# Patient Record
Sex: FEMALE | Race: BLACK OR AFRICAN AMERICAN | Employment: OTHER | URBAN - NONMETROPOLITAN AREA
[De-identification: names, ages, dates, MRNs, and addresses within clinical notes are randomized per-mention and may not be internally consistent; named-entity substitution may affect disease eponyms.]

---

## 2021-12-25 ENCOUNTER — APPOINTMENT (OUTPATIENT)
Dept: GENERAL RADIOLOGY | Age: 71
End: 2021-12-25
Attending: EMERGENCY MEDICINE
Payer: MEDICARE

## 2021-12-25 ENCOUNTER — HOSPITAL ENCOUNTER (EMERGENCY)
Age: 71
Discharge: ANOTHER HOSPITAL | End: 2021-12-26
Attending: EMERGENCY MEDICINE | Admitting: EMERGENCY MEDICINE
Payer: MEDICARE

## 2021-12-25 DIAGNOSIS — I50.43 ACUTE ON CHRONIC COMBINED SYSTOLIC AND DIASTOLIC CONGESTIVE HEART FAILURE (HCC): ICD-10-CM

## 2021-12-25 DIAGNOSIS — I21.4 NON-ST ELEVATION (NSTEMI) MYOCARDIAL INFARCTION (HCC): Primary | ICD-10-CM

## 2021-12-25 LAB
ALBUMIN SERPL-MCNC: 3.6 G/DL (ref 3.5–5)
ALBUMIN/GLOB SERPL: 0.8 {RATIO} (ref 1.1–2.2)
ALP SERPL-CCNC: 82 U/L (ref 45–117)
ALT SERPL-CCNC: 28 U/L (ref 12–78)
ANION GAP SERPL CALC-SCNC: 16 MMOL/L (ref 5–15)
AST SERPL W P-5'-P-CCNC: 38 U/L (ref 15–37)
BASOPHILS # BLD: 0.1 K/UL (ref 0–0.2)
BASOPHILS NFR BLD: 1 % (ref 0–2.5)
BILIRUB SERPL-MCNC: 0.2 MG/DL (ref 0.2–1)
BNP SERPL-MCNC: ABNORMAL PG/ML
BUN SERPL-MCNC: 62 MG/DL (ref 6–20)
BUN/CREAT SERPL: 11 (ref 12–20)
CA-I BLD-MCNC: 9.6 MG/DL (ref 8.5–10.1)
CHLORIDE SERPL-SCNC: 100 MMOL/L (ref 97–108)
CO2 SERPL-SCNC: 21 MMOL/L (ref 21–32)
CREAT SERPL-MCNC: 5.53 MG/DL (ref 0.55–1.02)
EOSINOPHIL # BLD: 0.1 K/UL (ref 0–0.7)
EOSINOPHIL NFR BLD: 1 % (ref 0.9–2.9)
ERYTHROCYTE [DISTWIDTH] IN BLOOD BY AUTOMATED COUNT: 18.5 % (ref 11.5–14.5)
GLOBULIN SER CALC-MCNC: 4.4 G/DL (ref 2–4)
GLUCOSE SERPL-MCNC: 150 MG/DL (ref 65–100)
HCT VFR BLD AUTO: 21.9 % (ref 36–46)
HGB BLD-MCNC: 7.1 G/DL (ref 13.5–17.5)
LACTATE SERPL-SCNC: 1 MMOL/L (ref 0.4–2)
LYMPHOCYTES # BLD: 2 K/UL (ref 1–4.8)
LYMPHOCYTES NFR BLD: 17 % (ref 20.5–51.1)
MCH RBC QN AUTO: 24.3 PG (ref 31–34)
MCHC RBC AUTO-ENTMCNC: 32.5 G/DL (ref 31–36)
MCV RBC AUTO: 74.8 FL (ref 80–100)
MONOCYTES # BLD: 0.8 K/UL (ref 0.2–2.4)
MONOCYTES NFR BLD: 7 % (ref 1.7–9.3)
NEUTS SEG # BLD: 8.7 K/UL (ref 1.8–7.7)
NEUTS SEG NFR BLD: 74 % (ref 42–75)
NRBC # BLD: 0 K/UL
NRBC BLD-RTO: 0 PER 100 WBC
PLATELET # BLD AUTO: 291 K/UL (ref 150–400)
PMV BLD AUTO: 8.2 FL (ref 6.5–11.5)
POTASSIUM SERPL-SCNC: 2.9 MMOL/L (ref 3.5–5.1)
PROT SERPL-MCNC: 8 G/DL (ref 6.4–8.2)
RBC # BLD AUTO: 2.93 M/UL (ref 4.5–5.9)
SODIUM SERPL-SCNC: 137 MMOL/L (ref 136–145)
TROPONIN-HIGH SENSITIVITY: 876 NG/L (ref 0–51)
WBC # BLD AUTO: 11.7 K/UL (ref 4.4–11.3)

## 2021-12-25 PROCEDURE — 94640 AIRWAY INHALATION TREATMENT: CPT

## 2021-12-25 PROCEDURE — 74011000250 HC RX REV CODE- 250: Performed by: EMERGENCY MEDICINE

## 2021-12-25 PROCEDURE — 85025 COMPLETE CBC W/AUTO DIFF WBC: CPT

## 2021-12-25 PROCEDURE — 96374 THER/PROPH/DIAG INJ IV PUSH: CPT

## 2021-12-25 PROCEDURE — 74011250637 HC RX REV CODE- 250/637: Performed by: EMERGENCY MEDICINE

## 2021-12-25 PROCEDURE — 84484 ASSAY OF TROPONIN QUANT: CPT

## 2021-12-25 PROCEDURE — 71045 X-RAY EXAM CHEST 1 VIEW: CPT

## 2021-12-25 PROCEDURE — 99285 EMERGENCY DEPT VISIT HI MDM: CPT

## 2021-12-25 PROCEDURE — 83880 ASSAY OF NATRIURETIC PEPTIDE: CPT

## 2021-12-25 PROCEDURE — 80053 COMPREHEN METABOLIC PANEL: CPT

## 2021-12-25 PROCEDURE — 96372 THER/PROPH/DIAG INJ SC/IM: CPT

## 2021-12-25 PROCEDURE — 83605 ASSAY OF LACTIC ACID: CPT

## 2021-12-25 PROCEDURE — 93005 ELECTROCARDIOGRAM TRACING: CPT

## 2021-12-25 PROCEDURE — 36415 COLL VENOUS BLD VENIPUNCTURE: CPT

## 2021-12-25 PROCEDURE — 74011250636 HC RX REV CODE- 250/636: Performed by: EMERGENCY MEDICINE

## 2021-12-25 RX ORDER — CETIRIZINE HCL 10 MG
10 TABLET ORAL
COMMUNITY

## 2021-12-25 RX ORDER — GUAIFENESIN 100 MG/5ML
324 LIQUID (ML) ORAL
Status: COMPLETED | OUTPATIENT
Start: 2021-12-25 | End: 2021-12-25

## 2021-12-25 RX ORDER — NITROGLYCERIN 0.4 MG/1
0.4 TABLET SUBLINGUAL
COMMUNITY

## 2021-12-25 RX ORDER — RANOLAZINE 500 MG/1
500 TABLET, EXTENDED RELEASE ORAL 2 TIMES DAILY
COMMUNITY

## 2021-12-25 RX ORDER — GUAIFENESIN 100 MG/5ML
81 LIQUID (ML) ORAL DAILY
COMMUNITY

## 2021-12-25 RX ORDER — METOPROLOL SUCCINATE 50 MG/1
50 TABLET, EXTENDED RELEASE ORAL DAILY
COMMUNITY

## 2021-12-25 RX ORDER — CLOPIDOGREL BISULFATE 75 MG/1
75 TABLET ORAL
COMMUNITY

## 2021-12-25 RX ORDER — ENOXAPARIN SODIUM 100 MG/ML
1 INJECTION SUBCUTANEOUS
Status: COMPLETED | OUTPATIENT
Start: 2021-12-25 | End: 2021-12-25

## 2021-12-25 RX ORDER — ISOSORBIDE MONONITRATE 30 MG/1
30 TABLET, EXTENDED RELEASE ORAL DAILY
COMMUNITY
End: 2022-01-08

## 2021-12-25 RX ORDER — FUROSEMIDE 10 MG/ML
40 INJECTION INTRAMUSCULAR; INTRAVENOUS
Status: COMPLETED | OUTPATIENT
Start: 2021-12-25 | End: 2021-12-25

## 2021-12-25 RX ORDER — IPRATROPIUM BROMIDE AND ALBUTEROL SULFATE 2.5; .5 MG/3ML; MG/3ML
3 SOLUTION RESPIRATORY (INHALATION)
Status: COMPLETED | OUTPATIENT
Start: 2021-12-25 | End: 2021-12-25

## 2021-12-25 RX ORDER — HYDRALAZINE HYDROCHLORIDE 25 MG/1
25 TABLET, FILM COATED ORAL 3 TIMES DAILY
COMMUNITY
End: 2022-01-08

## 2021-12-25 RX ORDER — ROSUVASTATIN CALCIUM 20 MG/1
20 TABLET, COATED ORAL
COMMUNITY

## 2021-12-25 RX ORDER — POTASSIUM CHLORIDE 20 MEQ/1
40 TABLET, EXTENDED RELEASE ORAL
Status: COMPLETED | OUTPATIENT
Start: 2021-12-25 | End: 2021-12-25

## 2021-12-25 RX ORDER — INSULIN ASPART 100 [IU]/ML
INJECTION, SOLUTION INTRAVENOUS; SUBCUTANEOUS
COMMUNITY

## 2021-12-25 RX ORDER — AMLODIPINE BESYLATE AND ATORVASTATIN CALCIUM 10; 10 MG/1; MG/1
1 TABLET, FILM COATED ORAL DAILY
COMMUNITY
End: 2021-12-26

## 2021-12-25 RX ADMIN — ASPIRIN 324 MG: 81 TABLET, CHEWABLE ORAL at 23:35

## 2021-12-25 RX ADMIN — POTASSIUM CHLORIDE 40 MEQ: 1500 TABLET, EXTENDED RELEASE ORAL at 23:33

## 2021-12-25 RX ADMIN — FUROSEMIDE 40 MG: 10 INJECTION, SOLUTION INTRAMUSCULAR; INTRAVENOUS at 23:36

## 2021-12-25 RX ADMIN — NITROGLYCERIN 1 INCH: 20 OINTMENT TOPICAL at 22:23

## 2021-12-25 RX ADMIN — ENOXAPARIN SODIUM 80 MG: 100 INJECTION SUBCUTANEOUS at 23:35

## 2021-12-25 RX ADMIN — IPRATROPIUM BROMIDE AND ALBUTEROL SULFATE 3 ML: .5; 2.5 SOLUTION RESPIRATORY (INHALATION) at 22:35

## 2021-12-26 ENCOUNTER — APPOINTMENT (OUTPATIENT)
Dept: NON INVASIVE DIAGNOSTICS | Age: 71
DRG: 280 | End: 2021-12-26
Attending: HOSPITALIST
Payer: MEDICARE

## 2021-12-26 ENCOUNTER — HOSPITAL ENCOUNTER (INPATIENT)
Age: 71
LOS: 13 days | Discharge: HOME | DRG: 280 | End: 2022-01-08
Attending: EMERGENCY MEDICINE | Admitting: HOSPITALIST
Payer: MEDICARE

## 2021-12-26 VITALS
DIASTOLIC BLOOD PRESSURE: 68 MMHG | BODY MASS INDEX: 36.29 KG/M2 | WEIGHT: 180 LBS | SYSTOLIC BLOOD PRESSURE: 153 MMHG | HEIGHT: 59 IN | RESPIRATION RATE: 26 BRPM | TEMPERATURE: 98.3 F | HEART RATE: 88 BPM | OXYGEN SATURATION: 98 %

## 2021-12-26 DIAGNOSIS — D64.9 ANEMIA, UNSPECIFIED TYPE: Primary | ICD-10-CM

## 2021-12-26 DIAGNOSIS — N18.9 CHRONIC KIDNEY DISEASE, UNSPECIFIED CKD STAGE: ICD-10-CM

## 2021-12-26 DIAGNOSIS — R07.89 OTHER CHEST PAIN: ICD-10-CM

## 2021-12-26 DIAGNOSIS — E87.6 HYPOKALEMIA: ICD-10-CM

## 2021-12-26 DIAGNOSIS — I21.4 NSTEMI (NON-ST ELEVATED MYOCARDIAL INFARCTION) (HCC): ICD-10-CM

## 2021-12-26 PROBLEM — R60.9 EDEMA: Status: ACTIVE | Noted: 2021-12-26

## 2021-12-26 PROBLEM — E11.9 TYPE 2 DIABETES MELLITUS, WITH LONG-TERM CURRENT USE OF INSULIN (HCC): Status: ACTIVE | Noted: 2021-12-26

## 2021-12-26 PROBLEM — I10 HTN (HYPERTENSION): Status: ACTIVE | Noted: 2021-12-26

## 2021-12-26 PROBLEM — Z79.4 TYPE 2 DIABETES MELLITUS, WITH LONG-TERM CURRENT USE OF INSULIN (HCC): Status: ACTIVE | Noted: 2021-12-26

## 2021-12-26 PROBLEM — E78.5 HLD (HYPERLIPIDEMIA): Status: ACTIVE | Noted: 2021-12-26

## 2021-12-26 PROBLEM — N18.5 CKD (CHRONIC KIDNEY DISEASE) STAGE 5, GFR LESS THAN 15 ML/MIN (HCC): Status: ACTIVE | Noted: 2021-12-26

## 2021-12-26 PROBLEM — Z90.5 ACQUIRED ABSENCE OF KIDNEY: Status: ACTIVE | Noted: 2021-12-26

## 2021-12-26 LAB
ANION GAP SERPL CALC-SCNC: 10 MMOL/L (ref 5–15)
ATRIAL RATE: 80 BPM
BASOPHILS # BLD: 0 K/UL (ref 0–0.1)
BASOPHILS NFR BLD: 0 % (ref 0–1)
BNP SERPL-MCNC: ABNORMAL PG/ML
BUN SERPL-MCNC: 61 MG/DL (ref 6–20)
BUN/CREAT SERPL: 11 (ref 12–20)
CA-I BLD-MCNC: 9.5 MG/DL (ref 8.5–10.1)
CALCULATED P AXIS, ECG09: 63 DEGREES
CALCULATED R AXIS, ECG10: 64 DEGREES
CALCULATED T AXIS, ECG11: 165 DEGREES
CHLORIDE SERPL-SCNC: 108 MMOL/L (ref 97–108)
CHOLEST SERPL-MCNC: 99 MG/DL
CO2 SERPL-SCNC: 21 MMOL/L (ref 21–32)
CREAT SERPL-MCNC: 5.6 MG/DL (ref 0.55–1.02)
DIAGNOSIS, 93000: NORMAL
DIFFERENTIAL METHOD BLD: ABNORMAL
ECHO AO ROOT DIAM: 3 CM
ECHO AO ROOT INDEX: 1.69 CM/M2
ECHO AV PEAK GRADIENT: 8 MMHG
ECHO AV PEAK VELOCITY: 1.5 M/S
ECHO AV VELOCITY RATIO: 0.53
ECHO EST RA PRESSURE: 3 MMHG
ECHO LA DIAMETER INDEX: 2.2 CM/M2
ECHO LA DIAMETER: 3.9 CM
ECHO LA TO AORTIC ROOT RATIO: 1.3
ECHO LV E' LATERAL VELOCITY: 7 CM/S
ECHO LV E' SEPTAL VELOCITY: 6 CM/S
ECHO LV EDV A4C: 113 ML
ECHO LV EDV INDEX A4C: 64 ML/M2
ECHO LV EJECTION FRACTION A4C: 41 %
ECHO LV EJECTION FRACTION BIPLANE: 62 % (ref 55–100)
ECHO LV ESV A4C: 67 ML
ECHO LV ESV INDEX A4C: 38 ML/M2
ECHO LV FRACTIONAL SHORTENING: 34 % (ref 28–44)
ECHO LV INTERNAL DIMENSION DIASTOLE INDEX: 2.15 CM/M2
ECHO LV INTERNAL DIMENSION DIASTOLIC: 3.8 CM (ref 3.9–5.3)
ECHO LV INTERNAL DIMENSION SYSTOLIC INDEX: 1.41 CM/M2
ECHO LV INTERNAL DIMENSION SYSTOLIC: 2.5 CM
ECHO LV IVSD: 1.4 CM (ref 0.6–0.9)
ECHO LV MASS 2D: 143.8 G (ref 67–162)
ECHO LV MASS INDEX 2D: 81.2 G/M2 (ref 43–95)
ECHO LV POSTERIOR WALL DIASTOLIC: 0.9 CM (ref 0.6–0.9)
ECHO LV RELATIVE WALL THICKNESS RATIO: 0.47
ECHO LVOT PEAK GRADIENT: 2 MMHG
ECHO LVOT PEAK VELOCITY: 0.8 M/S
ECHO MV MAX VELOCITY: 2 M/S
ECHO MV MEAN GRADIENT: 5 MMHG
ECHO MV MEAN VELOCITY: 1 M/S
ECHO MV PEAK GRADIENT: 16 MMHG
ECHO MV REGURGITANT PEAK GRADIENT: 108 MMHG
ECHO MV REGURGITANT PEAK VELOCITY: 5.2 M/S
ECHO MV VTI: 39 CM
ECHO PULMONARY ARTERY END DIASTOLIC PRESSURE: 8 MMHG
ECHO PV MAX VELOCITY: 1.1 M/S
ECHO PV PEAK GRADIENT: 5 MMHG
ECHO PV REGURGITANT MAX VELOCITY: 1.4 M/S
ECHO PVEIN A DURATION: 85 MS
ECHO PVEIN A VELOCITY: 0.4 M/S
ECHO RIGHT VENTRICULAR SYSTOLIC PRESSURE (RVSP): 36 MMHG
ECHO RV INTERNAL DIMENSION: 2.4 CM
ECHO TV REGURGITANT MAX VELOCITY: 2.88 M/S
ECHO TV REGURGITANT PEAK GRADIENT: 33 MMHG
EOSINOPHIL # BLD: 0.1 K/UL (ref 0–0.4)
EOSINOPHIL NFR BLD: 1 % (ref 0–7)
ERYTHROCYTE [DISTWIDTH] IN BLOOD BY AUTOMATED COUNT: 17.1 % (ref 11.5–14.5)
EST. AVERAGE GLUCOSE BLD GHB EST-MCNC: 114 MG/DL
GLUCOSE BLD STRIP.AUTO-MCNC: 136 MG/DL (ref 65–117)
GLUCOSE BLD STRIP.AUTO-MCNC: 143 MG/DL (ref 65–117)
GLUCOSE BLD STRIP.AUTO-MCNC: 149 MG/DL (ref 65–117)
GLUCOSE BLD STRIP.AUTO-MCNC: 150 MG/DL (ref 65–117)
GLUCOSE SERPL-MCNC: 137 MG/DL (ref 65–100)
HBA1C MFR BLD: 5.6 % (ref 4–5.6)
HCT VFR BLD AUTO: 20.9 % (ref 35–47)
HDLC SERPL-MCNC: 33 MG/DL
HDLC SERPL: 3 {RATIO} (ref 0–5)
HGB BLD-MCNC: 6.6 G/DL (ref 11.5–16)
IMM GRANULOCYTES # BLD AUTO: 0 K/UL (ref 0–0.04)
IMM GRANULOCYTES NFR BLD AUTO: 0 % (ref 0–0.5)
LDLC SERPL CALC-MCNC: 41.4 MG/DL (ref 0–100)
LIPID PROFILE,FLP: NORMAL
LYMPHOCYTES # BLD: 1.5 K/UL (ref 0.8–3.5)
LYMPHOCYTES NFR BLD: 15 % (ref 12–49)
MAGNESIUM SERPL-MCNC: 2 MG/DL (ref 1.6–2.4)
MCH RBC QN AUTO: 24.2 PG (ref 26–34)
MCHC RBC AUTO-ENTMCNC: 31.6 G/DL (ref 30–36.5)
MCV RBC AUTO: 76.6 FL (ref 80–99)
MONOCYTES # BLD: 0.6 K/UL (ref 0–1)
MONOCYTES NFR BLD: 6 % (ref 5–13)
NEUTS SEG # BLD: 7.7 K/UL (ref 1.8–8)
NEUTS SEG NFR BLD: 78 % (ref 32–75)
NRBC # BLD: 0 K/UL (ref 0–0.01)
NRBC BLD-RTO: 0 PER 100 WBC
P-R INTERVAL, ECG05: 160 MS
PERFORMED BY, TECHID: ABNORMAL
PLATELET # BLD AUTO: 274 K/UL (ref 150–400)
PMV BLD AUTO: 11.1 FL (ref 8.9–12.9)
POTASSIUM SERPL-SCNC: 3.2 MMOL/L (ref 3.5–5.1)
Q-T INTERVAL, ECG07: 372 MS
QRS DURATION, ECG06: 92 MS
QTC CALCULATION (BEZET), ECG08: 429 MS
RBC # BLD AUTO: 2.73 M/UL (ref 3.8–5.2)
SODIUM SERPL-SCNC: 139 MMOL/L (ref 136–145)
TRIGL SERPL-MCNC: 123 MG/DL (ref ?–150)
TROPONIN-HIGH SENSITIVITY: 1068 NG/L (ref 0–51)
TROPONIN-HIGH SENSITIVITY: 1160 NG/L (ref 0–51)
TROPONIN-HIGH SENSITIVITY: 941 NG/L (ref 0–51)
VENTRICULAR RATE, ECG03: 80 BPM
VLDLC SERPL CALC-MCNC: 24.6 MG/DL
WBC # BLD AUTO: 9.9 K/UL (ref 3.6–11)

## 2021-12-26 PROCEDURE — 30233N1 TRANSFUSION OF NONAUTOLOGOUS RED BLOOD CELLS INTO PERIPHERAL VEIN, PERCUTANEOUS APPROACH: ICD-10-PCS | Performed by: INTERNAL MEDICINE

## 2021-12-26 PROCEDURE — 81001 URINALYSIS AUTO W/SCOPE: CPT

## 2021-12-26 PROCEDURE — 80061 LIPID PANEL: CPT

## 2021-12-26 PROCEDURE — 86900 BLOOD TYPING SEROLOGIC ABO: CPT

## 2021-12-26 PROCEDURE — 83880 ASSAY OF NATRIURETIC PEPTIDE: CPT

## 2021-12-26 PROCEDURE — 36415 COLL VENOUS BLD VENIPUNCTURE: CPT

## 2021-12-26 PROCEDURE — 85730 THROMBOPLASTIN TIME PARTIAL: CPT

## 2021-12-26 PROCEDURE — 84484 ASSAY OF TROPONIN QUANT: CPT

## 2021-12-26 PROCEDURE — C8929 TTE W OR WO FOL WCON,DOPPLER: HCPCS

## 2021-12-26 PROCEDURE — 65270000029 HC RM PRIVATE

## 2021-12-26 PROCEDURE — 77010033678 HC OXYGEN DAILY

## 2021-12-26 PROCEDURE — 83036 HEMOGLOBIN GLYCOSYLATED A1C: CPT

## 2021-12-26 PROCEDURE — 93005 ELECTROCARDIOGRAM TRACING: CPT

## 2021-12-26 PROCEDURE — 74011250637 HC RX REV CODE- 250/637: Performed by: INTERNAL MEDICINE

## 2021-12-26 PROCEDURE — 86923 COMPATIBILITY TEST ELECTRIC: CPT

## 2021-12-26 PROCEDURE — 84156 ASSAY OF PROTEIN URINE: CPT

## 2021-12-26 PROCEDURE — 83735 ASSAY OF MAGNESIUM: CPT

## 2021-12-26 PROCEDURE — 74011250636 HC RX REV CODE- 250/636: Performed by: INTERNAL MEDICINE

## 2021-12-26 PROCEDURE — 36430 TRANSFUSION BLD/BLD COMPNT: CPT

## 2021-12-26 PROCEDURE — 99285 EMERGENCY DEPT VISIT HI MDM: CPT

## 2021-12-26 PROCEDURE — 85025 COMPLETE CBC W/AUTO DIFF WBC: CPT

## 2021-12-26 PROCEDURE — P9016 RBC LEUKOCYTES REDUCED: HCPCS

## 2021-12-26 PROCEDURE — 80048 BASIC METABOLIC PNL TOTAL CA: CPT

## 2021-12-26 PROCEDURE — 74011636637 HC RX REV CODE- 636/637: Performed by: HOSPITALIST

## 2021-12-26 PROCEDURE — 74011250637 HC RX REV CODE- 250/637: Performed by: HOSPITALIST

## 2021-12-26 PROCEDURE — 82962 GLUCOSE BLOOD TEST: CPT

## 2021-12-26 RX ORDER — ISOSORBIDE MONONITRATE 60 MG/1
60 TABLET, EXTENDED RELEASE ORAL DAILY
Status: DISCONTINUED | OUTPATIENT
Start: 2021-12-26 | End: 2021-12-31

## 2021-12-26 RX ORDER — HEPARIN SODIUM 10000 [USP'U]/100ML
12-25 INJECTION, SOLUTION INTRAVENOUS
Status: DISCONTINUED | OUTPATIENT
Start: 2021-12-26 | End: 2021-12-28

## 2021-12-26 RX ORDER — HEPARIN SODIUM 1000 [USP'U]/ML
2000 INJECTION, SOLUTION INTRAVENOUS; SUBCUTANEOUS AS NEEDED
Status: DISCONTINUED | OUTPATIENT
Start: 2021-12-26 | End: 2021-12-28

## 2021-12-26 RX ORDER — MELATONIN
1000 DAILY
Status: DISCONTINUED | OUTPATIENT
Start: 2021-12-26 | End: 2022-01-08 | Stop reason: HOSPADM

## 2021-12-26 RX ORDER — SODIUM CHLORIDE 0.9 % (FLUSH) 0.9 %
5-40 SYRINGE (ML) INJECTION AS NEEDED
Status: DISCONTINUED | OUTPATIENT
Start: 2021-12-26 | End: 2022-01-08 | Stop reason: HOSPADM

## 2021-12-26 RX ORDER — SODIUM CHLORIDE 0.9 % (FLUSH) 0.9 %
5-40 SYRINGE (ML) INJECTION EVERY 8 HOURS
Status: DISCONTINUED | OUTPATIENT
Start: 2021-12-26 | End: 2022-01-08 | Stop reason: ALTCHOICE

## 2021-12-26 RX ORDER — GUAIFENESIN 100 MG/5ML
81 LIQUID (ML) ORAL DAILY
Status: DISCONTINUED | OUTPATIENT
Start: 2021-12-26 | End: 2022-01-08 | Stop reason: HOSPADM

## 2021-12-26 RX ORDER — RANOLAZINE 500 MG/1
500 TABLET, EXTENDED RELEASE ORAL 2 TIMES DAILY
Status: DISCONTINUED | OUTPATIENT
Start: 2021-12-26 | End: 2022-01-08 | Stop reason: HOSPADM

## 2021-12-26 RX ORDER — ONDANSETRON 2 MG/ML
4 INJECTION INTRAMUSCULAR; INTRAVENOUS
Status: DISCONTINUED | OUTPATIENT
Start: 2021-12-26 | End: 2022-01-08 | Stop reason: HOSPADM

## 2021-12-26 RX ORDER — HYDRALAZINE HYDROCHLORIDE 25 MG/1
25 TABLET, FILM COATED ORAL 3 TIMES DAILY
Status: DISCONTINUED | OUTPATIENT
Start: 2021-12-26 | End: 2021-12-27

## 2021-12-26 RX ORDER — GLIPIZIDE 10 MG/1
10 TABLET, FILM COATED, EXTENDED RELEASE ORAL
COMMUNITY
End: 2022-01-08

## 2021-12-26 RX ORDER — FUROSEMIDE 10 MG/ML
80 INJECTION INTRAMUSCULAR; INTRAVENOUS 2 TIMES DAILY
Status: DISCONTINUED | OUTPATIENT
Start: 2021-12-26 | End: 2021-12-30

## 2021-12-26 RX ORDER — OMEGA-3/DHA/EPA/FISH OIL 300-1000MG
1 CAPSULE,DELAYED RELEASE (ENTERIC COATED) ORAL DAILY
COMMUNITY

## 2021-12-26 RX ORDER — AMLODIPINE BESYLATE 5 MG/1
10 TABLET ORAL DAILY
Status: DISCONTINUED | OUTPATIENT
Start: 2021-12-26 | End: 2021-12-31

## 2021-12-26 RX ORDER — INSULIN GLARGINE 100 [IU]/ML
25 INJECTION, SOLUTION SUBCUTANEOUS
Status: DISCONTINUED | OUTPATIENT
Start: 2021-12-26 | End: 2022-01-04

## 2021-12-26 RX ORDER — CETIRIZINE HCL 10 MG
10 TABLET ORAL DAILY
Status: DISCONTINUED | OUTPATIENT
Start: 2021-12-26 | End: 2022-01-08 | Stop reason: HOSPADM

## 2021-12-26 RX ORDER — AMLODIPINE BESYLATE 10 MG/1
TABLET ORAL
COMMUNITY
End: 2022-01-08

## 2021-12-26 RX ORDER — CLOPIDOGREL BISULFATE 75 MG/1
75 TABLET ORAL DAILY
Status: DISCONTINUED | OUTPATIENT
Start: 2021-12-26 | End: 2022-01-08 | Stop reason: HOSPADM

## 2021-12-26 RX ORDER — SODIUM CHLORIDE 9 MG/ML
250 INJECTION, SOLUTION INTRAVENOUS AS NEEDED
Status: DISCONTINUED | OUTPATIENT
Start: 2021-12-26 | End: 2022-01-08 | Stop reason: HOSPADM

## 2021-12-26 RX ORDER — INSULIN LISPRO 100 [IU]/ML
INJECTION, SOLUTION INTRAVENOUS; SUBCUTANEOUS
Status: DISCONTINUED | OUTPATIENT
Start: 2021-12-26 | End: 2022-01-06

## 2021-12-26 RX ORDER — FUROSEMIDE 10 MG/ML
40 INJECTION INTRAMUSCULAR; INTRAVENOUS
Status: COMPLETED | OUTPATIENT
Start: 2021-12-26 | End: 2021-12-26

## 2021-12-26 RX ORDER — METOPROLOL SUCCINATE 50 MG/1
50 TABLET, EXTENDED RELEASE ORAL DAILY
Status: DISCONTINUED | OUTPATIENT
Start: 2021-12-26 | End: 2022-01-08 | Stop reason: HOSPADM

## 2021-12-26 RX ORDER — ATORVASTATIN CALCIUM 40 MG/1
40 TABLET, FILM COATED ORAL
Status: DISCONTINUED | OUTPATIENT
Start: 2021-12-26 | End: 2022-01-08 | Stop reason: HOSPADM

## 2021-12-26 RX ORDER — DEXTROSE 50 % IN WATER (D50W) INTRAVENOUS SYRINGE
25-50 AS NEEDED
Status: DISCONTINUED | OUTPATIENT
Start: 2021-12-26 | End: 2022-01-08 | Stop reason: HOSPADM

## 2021-12-26 RX ORDER — MELATONIN
1000 DAILY
COMMUNITY

## 2021-12-26 RX ORDER — POTASSIUM CHLORIDE 750 MG/1
40 TABLET, FILM COATED, EXTENDED RELEASE ORAL DAILY
Status: DISCONTINUED | OUTPATIENT
Start: 2021-12-26 | End: 2021-12-30

## 2021-12-26 RX ORDER — OMEGA-3/DHA/EPA/FISH OIL 300-1000MG
1 CAPSULE,DELAYED RELEASE (ENTERIC COATED) ORAL DAILY
Status: DISCONTINUED | OUTPATIENT
Start: 2021-12-26 | End: 2021-12-26 | Stop reason: CLARIF

## 2021-12-26 RX ORDER — ACETAMINOPHEN 325 MG/1
650 TABLET ORAL
Status: DISCONTINUED | OUTPATIENT
Start: 2021-12-26 | End: 2022-01-08 | Stop reason: HOSPADM

## 2021-12-26 RX ORDER — HEPARIN SODIUM 1000 [USP'U]/ML
4000 INJECTION, SOLUTION INTRAVENOUS; SUBCUTANEOUS AS NEEDED
Status: DISCONTINUED | OUTPATIENT
Start: 2021-12-26 | End: 2021-12-28

## 2021-12-26 RX ORDER — ISOSORBIDE MONONITRATE 30 MG/1
30 TABLET, EXTENDED RELEASE ORAL DAILY
Status: DISCONTINUED | OUTPATIENT
Start: 2021-12-26 | End: 2021-12-26

## 2021-12-26 RX ORDER — NITROGLYCERIN 0.4 MG/1
0.4 TABLET SUBLINGUAL
Status: DISCONTINUED | OUTPATIENT
Start: 2021-12-26 | End: 2022-01-08 | Stop reason: HOSPADM

## 2021-12-26 RX ORDER — MAGNESIUM SULFATE 100 %
4 CRYSTALS MISCELLANEOUS AS NEEDED
Status: DISCONTINUED | OUTPATIENT
Start: 2021-12-26 | End: 2022-01-08 | Stop reason: HOSPADM

## 2021-12-26 RX ADMIN — CLOPIDOGREL BISULFATE 75 MG: 75 TABLET ORAL at 11:39

## 2021-12-26 RX ADMIN — HYDRALAZINE HYDROCHLORIDE 25 MG: 25 TABLET, FILM COATED ORAL at 11:39

## 2021-12-26 RX ADMIN — ISOSORBIDE MONONITRATE 30 MG: 30 TABLET, EXTENDED RELEASE ORAL at 11:38

## 2021-12-26 RX ADMIN — INSULIN GLARGINE 25 UNITS: 100 INJECTION, SOLUTION SUBCUTANEOUS at 22:46

## 2021-12-26 RX ADMIN — SODIUM CHLORIDE, PRESERVATIVE FREE 10 ML: 5 INJECTION INTRAVENOUS at 06:20

## 2021-12-26 RX ADMIN — FUROSEMIDE 80 MG: 10 INJECTION, SOLUTION INTRAMUSCULAR; INTRAVENOUS at 22:46

## 2021-12-26 RX ADMIN — Medication 1000 UNITS: at 11:39

## 2021-12-26 RX ADMIN — ISOSORBIDE MONONITRATE 60 MG: 60 TABLET, EXTENDED RELEASE ORAL at 17:57

## 2021-12-26 RX ADMIN — AMLODIPINE BESYLATE 10 MG: 5 TABLET ORAL at 11:39

## 2021-12-26 RX ADMIN — FUROSEMIDE 40 MG: 10 INJECTION, SOLUTION INTRAMUSCULAR; INTRAVENOUS at 11:50

## 2021-12-26 RX ADMIN — RANOLAZINE 500 MG: 500 TABLET, FILM COATED, EXTENDED RELEASE ORAL at 11:38

## 2021-12-26 RX ADMIN — METOPROLOL SUCCINATE 50 MG: 50 TABLET, EXTENDED RELEASE ORAL at 11:39

## 2021-12-26 RX ADMIN — PERFLUTREN 2 ML: 6.52 INJECTION, SUSPENSION INTRAVENOUS at 10:06

## 2021-12-26 RX ADMIN — ATORVASTATIN CALCIUM 40 MG: 40 TABLET, FILM COATED ORAL at 22:47

## 2021-12-26 RX ADMIN — INSULIN LISPRO 2 UNITS: 100 INJECTION, SOLUTION INTRAVENOUS; SUBCUTANEOUS at 11:38

## 2021-12-26 RX ADMIN — SODIUM CHLORIDE, PRESERVATIVE FREE 10 ML: 5 INJECTION INTRAVENOUS at 17:58

## 2021-12-26 RX ADMIN — POTASSIUM CHLORIDE 40 MEQ: 750 TABLET, FILM COATED, EXTENDED RELEASE ORAL at 11:39

## 2021-12-26 RX ADMIN — ASPIRIN 81 MG CHEWABLE TABLET 81 MG: 81 TABLET CHEWABLE at 11:39

## 2021-12-26 RX ADMIN — CETIRIZINE HYDROCHLORIDE 10 MG: 10 TABLET, FILM COATED ORAL at 11:39

## 2021-12-26 RX ADMIN — HYDRALAZINE HYDROCHLORIDE 25 MG: 25 TABLET, FILM COATED ORAL at 16:43

## 2021-12-26 RX ADMIN — HYDRALAZINE HYDROCHLORIDE 25 MG: 25 TABLET, FILM COATED ORAL at 22:47

## 2021-12-26 NOTE — Clinical Note
TRANSFER - OUT REPORT:     Verbal report given to: Ritu Miranda, (at bedside). Report consisted of patient's Situation, Background, Assessment and   Recommendations(SBAR). Opportunity for questions and clarification was provided. Patient transported with a Registered Nurse. Patient transported to: PACU.

## 2021-12-26 NOTE — ED TRIAGE NOTES
Pt states she began having shortness of breath that started yesterday while eating dinner. Pt has hx of CHF, diabetes and HTN. Pt states has trouble lying flat as well. Pt is tachypneic on triage with audible expiratory wheezing.

## 2021-12-26 NOTE — ASSESSMENT & PLAN NOTE
NSTEMI with initial troponin of 800 and repeat of 1000.  -Continue home aspirin, Plavix  -Continue home statin  -Continue home beta-blocker and Ranexa  -Holding ACE inhibitor/ARB with creatinine of 5  -Received a dose of 1 mg/kg Lovenox on 12/25 at 2330.  We will switch to heparin drip to start on 12/26 at 2330 with CKD Stage 5  -N.p.o. pending cardiology consult in the morning

## 2021-12-26 NOTE — ASSESSMENT & PLAN NOTE
Bilateral lower extremity edema past medical history of congestive heart failure unknown type  -Obtain echo  -Cardiology consult

## 2021-12-26 NOTE — CONSULTS
Consult received from [address] [place] [address] today afternoon. Patient is admitted for volume overload , NSTEMI and CHF. She sees a Nephrologist in [place] and has a H/o nephrectomy for RCC and stenting in the other Kidney for ? GENO . She was seeing a nephrologist in [place] who recommended that she start Dialysis in near future ,    Upon further discussion with  he said ,patient was on   2 L O2 via NC and not in any distress but had lower extremity edema . Her Serum K+ was 3.2 , Bicarb was 21 and Creatinine was 5.6 . Cxary did not report any [place] CHF findings . Discussed about starting patient on Lasix 80 mg Q12 hours for adequate diuresis and will follow up with a full consult for possible initiation of Dialysis in the coming days .

## 2021-12-26 NOTE — ED PROVIDER NOTES
Patient presents with complaint of shortness of breath since last night at dinner. Patient has a history of CAD S/P CABG , and CHF. She reports ARANDA and increased shortness of breath upon laying flat. No chest pain, fever, nausea or diaphoresis. Duration:  1 day    Intensity: moderate    Modified by: exertion, laying flat    Social History : smoked in distant past.                Past Medical History:   Diagnosis Date    CAD (coronary artery disease)     Diabetes ([address]     Heart failure ([address]     Hypertension        Past Surgical History:   Procedure Laterality Date    NE CARDIAC SURG PROCEDURE UNLIST           History reviewed. No pertinent family history. Social History     Socioeconomic History    Marital status: Not on file     Spouse name: Not on file    Number of children: Not on file    Years of education: Not on file    Highest education level: Not on file   Occupational History    Not on file   Tobacco Use    Smoking status: Former Smoker    Smokeless tobacco: Former User     Quit date: 1/1/2018   Substance and Sexual Activity    Alcohol use: Not on file    Drug use: Not on file    Sexual activity: Not on file   Other Topics Concern    Not on file   Social History Narrative    Not on file     Social Determinants of Health     Financial Resource Strain:     Difficulty of Paying Living Expenses: Not on file   Food Insecurity:     Worried About [address] in the Last Year: Not on file    [address] in the Last Year: Not on file   Transportation Needs:     Lack of Transportation (Medical): Not on file    Lack of Transportation (Non-Medical):  Not on file   Physical Activity:     Days of Exercise per Week: Not on file    Minutes of Exercise per Session: Not on file   Stress:     Feeling of Stress : Not on file   Social Connections:     Frequency of Communication with Friends and Family: Not on file    Frequency of Social Gatherings with Friends and Family: Not on file    Attends Baptism Services: Not on file    Active Member of Clubs or Organizations: Not on file    Attends Club or Organization Meetings: Not on file    Marital Status: Not on file   Intimate Partner Violence:     Fear of Current or Ex-Partner: Not on file    Emotionally Abused: Not on file    Physically Abused: Not on file    Sexually Abused: Not on file   Housing Stability:     Unable to Pay for Housing in the Last Year: Not on file    Number of [place] in the Last Year: Not on file    Unstable Housing in the Last Year: Not on file         ALLERGIES: Penicillin g    Review of Systems   Constitutional: Negative. Negative for diaphoresis and fever. HENT: Negative. Eyes: Negative. Respiratory: Positive for shortness of breath. Cardiovascular: Negative. Negative for chest pain. Gastrointestinal: Negative. Negative for nausea and vomiting. Endocrine: Negative. Genitourinary: Negative. Skin: Negative. Allergic/Immunologic: Negative. Neurological: Negative. Hematological: Negative. Psychiatric/Behavioral: Negative. All other systems reviewed and are negative. Vitals:    12/25/21 2154 12/25/21 2200   BP: (!) 175/101    Pulse: 81    Resp: 24    Temp: 98.3 °F (36.8 °C)    SpO2: 93% 93%   Weight: 81.6 kg (180 lb)    Height: 4' 11\" (1.499 m)             Physical Exam  Vitals and nursing note reviewed. Constitutional:       Appearance: She is well-developed. HENT:      Head: Normocephalic and atraumatic. Cardiovascular:      Rate and Rhythm: Normal rate and regular rhythm. Heart sounds: Normal heart sounds. Pulmonary:      Breath sounds: Examination of the right-lower field reveals rales. Examination of the left-lower field reveals rales. Rales present. Abdominal:      General: Bowel sounds are normal.      Palpations: Abdomen is soft. Genitourinary:     Rectum: Guaiac result negative.    Musculoskeletal:         General: Normal range of motion. Cervical back: Normal range of motion and neck supple. Right lower leg: Edema present. Left lower leg: Edema present. Neurological:      General: No focal deficit present. Mental Status: She is alert. Psychiatric:         Mood and Affect: Mood normal.         Behavior: Behavior normal.          MDM  Number of Diagnoses or Management Options  Risk of Complications, Morbidity, and/or Mortality  Presenting problems: moderate  Diagnostic procedures: moderate  Management options: moderate  General comments: Family requests transfer to [address] [place] [address] where her doctor practices. No beds available at [address] [place] [address].      Discussed with Dr Lydia Tam at [facility] ER and he accepts patient as a transfer    [facility] critical care time - 30 minutes    Patient Progress  Patient progress: improved         Procedures

## 2021-12-26 NOTE — H&P
GENERAL GENERIC H&P/CONSULT    CC-chest pain, shortness of breath    Subjective:    74-year-old female with a past medical history significant for coronary artery disease status post CABG, chronic kidney disease stage V, hypertension, insulin-dependent diabetes, hyperlipidemia who presents to the emergency department complaining of dyspnea on exertion and lying flat as well as left-sided chest pain. Unfortunately, the patient is a poor historian making the history gathering challenging. She denies to me any history of heart problems whereas in the medical record, it is stated that she has a history of coronary artery disease status post CABG and CHF. She does tell me that she has been having worsening shortness of breath upon lying flat and exertion for the last 2 days. This has been associated with some left-sided chest pain. This is also been associated with bilateral lower extremity swelling. Otherwise, she has no other complaints and specifically denies fever/chills, headache, dizziness, palpitations, cough, abdominal pain or nausea/vomiting. In the emergency department, she was found to be hypertensive with otherwise stable vital signs within normal limits. A CBC was significant for a hemoglobin of 7.1 which is less than her baseline of 9. A CMP was significant for a potassium of 2.9 and creatinine of 5.5 which is at her baseline. Initial troponin was 876 with repeat of 1068. She was given a dose of 1 mg/kg therapeutic Lovenox on 12/25 at 2330. She will be admitted to the hospital service to the telemetry floor for an NSTEMI. Past Medical History:   Diagnosis Date    CAD (coronary artery disease)     Diabetes [facility]     Heart failure ([facility]     Hypertension       Past Surgical History:   Procedure Laterality Date    AR CARDIAC SURG PROCEDURE UNLIST        Prior to Admission medications    Medication Sig Start Date End Date Taking?  Authorizing Provider   amLODIPine (NORVASC) 10 mg tablet 1 tablet Provider, Historical   cholecalciferol (VITAMIN D3) (1000 Units /25 mcg) tablet Take 1,000 Units by mouth daily. Provider, Historical   glipiZIDE SR (GLUCOTROL XL) 10 mg CR tablet Take 10 mg by mouth. Provider, Historical   insulin detemir U-100 (LEVEMIR) 100 unit/mL injection 44 Units by SubCUTAneous route At bedtime. Provider, Historical   fish oil- omega-3 fatty acids 300-1,000 mg capsule Take 1 Capsule by mouth daily. Provider, Historical   aspirin 81 mg chewable tablet Take 81 mg by mouth daily. Saravanan Schofield MD   cetirizine (ZYRTEC) 10 mg tablet Take 10 mg by mouth. Saravanan Schofield MD   clopidogreL (PLAVIX) 75 mg tab Take 75 mg by mouth. Saravanan Schofield MD   hydrALAZINE (APRESOLINE) 25 mg tablet Take 25 mg by mouth three (3) times daily. Saravanan Schofield MD   insulin aspart U-100 (NOVOLOG) 100 unit/mL (3 mL) inpn by SubCUTAneous route Before breakfast, lunch, dinner and at bedtime. Saravanan Schofield MD   isosorbide mononitrate ER (IMDUR) 30 mg tablet Take 30 mg by mouth daily. Saravanan Schofield MD   metoprolol succinate (TOPROL-XL) 50 mg XL tablet Take 50 mg by mouth daily. Saravanan Schofield MD   nitroglycerin (NITROSTAT) 0.4 mg SL tablet 0.4 mg by SubLINGual route every five (5) minutes as needed for Chest Pain. Up to 3 doses. Saravanan Schofield MD   ranolazine ER (RANEXA) 500 mg SR tablet Take 500 mg by mouth two (2) times a day. Saravanan Schofield MD   rosuvastatin (CRESTOR) 20 mg tablet Take 20 mg by mouth nightly. Saravanan Schofield MD     Allergies   Allergen Reactions    Penicillin G Hives      Social History     Tobacco Use    Smoking status: Former Smoker    Smokeless tobacco: Former User     Quit date: 1/1/2018   Substance Use Topics    Alcohol use: Not Currently      Family History   Problem Relation Age of Onset    Hypertension Father       Review of Systems   Constitutional: Negative for chills, fatigue and fever. HENT: Negative for ear pain, rhinorrhea and sore throat.     Eyes: Negative for pain and discharge. Respiratory: Positive for shortness of breath. Negative for cough and wheezing. Cardiovascular: Positive for chest pain and leg swelling. Negative for palpitations. Gastrointestinal: Negative for abdominal pain, constipation, diarrhea, nausea and vomiting. Genitourinary: Negative for dysuria and hematuria. Musculoskeletal: Negative for gait problem and joint swelling. Skin: Negative for rash and wound. Neurological: Negative for dizziness, syncope, light-headedness and numbness. Psychiatric/Behavioral: Negative for dysphoric mood. The patient is not nervous/anxious. Objective:    No intake/output data recorded. No intake/output data recorded. Patient Vitals for the past 8 hrs:   BP Temp Pulse Resp SpO2 Height Weight   12/26/21 0542 (!) 152/64 97.6 °F (36.4 °C) 74 23 100 %     12/26/21 0440 (!) 175/68  77 17 98 %     12/26/21 0400 (!) 169/76  75 28 99 %     12/26/21 0310 (!) 167/77  72 28 100 %     12/26/21 0228 132/86  73 18 100 %     12/26/21 0153      4' 11\" (1.499 m) 82.6 kg (182 lb)   12/26/21 0150 (!) 169/71 98.5 °F (36.9 °C) 85 18 95 %     12/26/21 0149     95 %       Physical Exam  Constitutional:       General: She is not in acute distress. Appearance: Normal appearance. She is normal weight. She is not ill-appearing, toxic-appearing or diaphoretic. HENT:      Head: Normocephalic and atraumatic. Mouth/Throat:      Mouth: Mucous membranes are moist.      Pharynx: Oropharynx is clear. Eyes:      Extraocular Movements: Extraocular movements intact. Pupils: Pupils are equal, round, and reactive to light. Cardiovascular:      Rate and Rhythm: Normal rate and regular rhythm. Pulses: Normal pulses. Heart sounds: No murmur heard. Comments: Chest pain is not reproducible with palpation  Pulmonary:      Effort: Pulmonary effort is normal. No respiratory distress. Breath sounds: Normal breath sounds.  No wheezing. Abdominal:      General: Abdomen is flat. Bowel sounds are normal. There is no distension. Palpations: Abdomen is soft. Tenderness: There is no abdominal tenderness. Musculoskeletal:         General: No swelling or tenderness. Cervical back: Normal range of motion and neck supple. Right lower leg: Edema present. Left lower leg: Edema present. Skin:     General: Skin is warm and dry. Neurological:      General: No focal deficit present. Mental Status: She is alert and oriented to person, place, and time. Mental status is at baseline. Psychiatric:         Mood and Affect: Mood normal.         Behavior: Behavior normal.          Labs:    Recent Results (from the past 24 hour(s))   CBC WITH AUTOMATED DIFF    Collection Time: 12/25/21 10:31 PM   Result Value Ref Range    WBC 11.7 (H) 4.4 - 11.3 K/uL    RBC 2.93 (L) 4.50 - 5.90 M/uL    HGB 7.1 (L) 13.5 - 17.5 g/dL    HCT 21.9 (L) 36 - 46 %    MCV 74.8 (L) 80 - 100 FL    MCH 24.3 (L) 31 - 34 PG    MCHC 32.5 31.0 - 36.0 g/dL    RDW 18.5 (H) 11.5 - 14.5 %    PLATELET 766 676 - 681 K/uL    MPV 8.2 6.5 - 11.5 FL    NRBC 0.0  WBC    ABSOLUTE NRBC 0.00 K/uL    NEUTROPHILS 74 42 - 75 %    LYMPHOCYTES 17 (L) 20.5 - 51.1 %    MONOCYTES 7 1.7 - 9.3 %    EOSINOPHILS 1 0.9 - 2.9 %    BASOPHILS 1 0.0 - 2.5 %    ABS. NEUTROPHILS 8.7 (H) 1.8 - 7.7 K/UL    ABS. LYMPHOCYTES 2.0 1.0 - 4.8 K/UL    ABS. MONOCYTES 0.8 0.2 - 2.4 K/UL    ABS. EOSINOPHILS 0.1 0.0 - 0.7 K/UL    ABS.  BASOPHILS 0.1 0.0 - 0.2 K/UL   METABOLIC PANEL, COMPREHENSIVE    Collection Time: 12/25/21 10:31 PM   Result Value Ref Range    Sodium 137 136 - 145 mmol/L    Potassium 2.9 (L) 3.5 - 5.1 mmol/L    Chloride 100 97 - 108 mmol/L    CO2 21 21 - 32 mmol/L    Anion gap 16 (H) 5 - 15 mmol/L    Glucose 150 (H) 65 - 100 mg/dL    BUN 62 (H) 6 - 20 mg/dL    Creatinine 5.53 (H) 0.55 - 1.02 mg/dL    BUN/Creatinine ratio 11 (L) 12 - 20      GFR est AA 9 (L) >60 ml/min/1.73m2 GFR est non-AA 8 (L) >60 ml/min/1.73m2    Calcium 9.6 8.5 - 10.1 mg/dL    Bilirubin, total 0.2 0.2 - 1.0 mg/dL    AST (SGOT) 38 (H) 15 - 37 U/L    ALT (SGPT) 28 12 - 78 U/L    Alk.  phosphatase 82 45 - 117 U/L    Protein, total 8.0 6.4 - 8.2 g/dL    Albumin 3.6 3.5 - 5.0 g/dL    Globulin 4.4 (H) 2.0 - 4.0 g/dL    A-G Ratio 0.8 (L) 1.1 - 2.2     NT-PRO BNP    Collection Time: 12/25/21 10:31 PM   Result Value Ref Range    NT pro-BNP 22,262 (H) <125 pg/mL   TROPONIN-HIGH SENSITIVITY    Collection Time: 12/25/21 10:31 PM   Result Value Ref Range    Troponin-High Sensitivity 876 (HH) 0 - 51 ng/L   LACTIC ACID    Collection Time: 12/25/21 10:31 PM   Result Value Ref Range    Lactic acid 1.0 0.4 - 2.0 mmol/L   EKG, 12 LEAD, INITIAL    Collection Time: 12/25/21 10:51 PM   Result Value Ref Range    Ventricular Rate 79 BPM    Atrial Rate 79 BPM    P-R Interval 177 ms    QRS Duration 94 ms    Q-T Interval 389 ms    QTC Calculation (Bezet) 447 ms    Calculated P Axis 64 degrees    Calculated R Axis 55 degrees    Calculated T Axis 155 degrees    Diagnosis       Sinus rhythm  Probable left atrial enlargement  Repol abnrm suggests ischemia, lateral leads  Baseline wander in lead(s) I,II,aVR,V1,V3,V4,V5,V6     NT-PRO BNP    Collection Time: 12/26/21  2:01 AM   Result Value Ref Range    NT pro-BNP 25,810 (H) <125 pg/mL   TROPONIN-HIGH SENSITIVITY    Collection Time: 12/26/21  2:01 AM   Result Value Ref Range    Troponin-High Sensitivity 1,068 (HH) 0 - 51 ng/L   TROPONIN-HIGH SENSITIVITY    Collection Time: 12/26/21  4:32 AM   Result Value Ref Range    Troponin-High Sensitivity 1,160 (HH) 0 - 51 ng/L   GLUCOSE, POC    Collection Time: 12/26/21  5:08 AM   Result Value Ref Range    Glucose (POC) 149 (H) 65 - 117 mg/dL    Performed by Hortensia Babinski        ECG: normal EKG, normal sinus rhythm, unchanged from previous tracings   Chest X-Ray: pulmonary edema    Assessment:  Principal Problem:    NSTEMI (non-ST elevated myocardial infarction) ([facility] (12/26/2021)    Active Problems:    HTN (hypertension) (12/26/2021)      HLD (hyperlipidemia) (12/26/2021)      Edema (12/26/2021)      Type 2 diabetes mellitus, with long-term current use of insulin ([facility] (12/26/2021)      CKD (chronic kidney disease) stage 5, GFR less than 15 ml/min ([facility]) (12/26/2021)      Anemia (12/26/2021)        Plan:    NSTEMI (non-ST elevated myocardial infarction) ([facility]  NSTEMI with initial troponin of 800 and repeat of 1000.  -Continue home aspirin, Plavix  -Continue home statin  -Continue home beta-blocker and Ranexa  -Holding ACE inhibitor/ARB with creatinine of 5  -Received a dose of 1 mg/kg Lovenox on 12/25 at 2330. We will switch to heparin drip to start on 12/26 at 2330 with CKD Stage 5  -N.p.o. pending cardiology consult in the morning    Edema  Bilateral lower extremity edema past medical history of congestive heart failure unknown type  -Obtain echo  -Cardiology consult    Anemia  Hemoglobin on admission of 7.1, baseline is 9  -Transfuse 1 unit packed red blood cells for hemoglobin less than 8 in the setting of coronary artery disease    HTN (hypertension)  -Blood pressure stable, continue home medications    HLD (hyperlipidemia)  -Continue home Crestor  -Check lipid panel    Type 2 diabetes mellitus, with long-term current use of insulin ([facility])  Insulin-dependent diabetes mellitus type 2.  On 44 units detemir at home  -25 units Lantus daily in the inpatient setting  -Sliding scale insulin usual sensitivity with Accu-Cheks  -Check hemoglobin A1c    CKD (chronic kidney disease) stage 5, GFR less than 15 ml/min ([facility])  Baseline creatinine of 5.5, 5.5 on admission  -Trend with daily BMP      Signed:  Silvio Adorno MD 12/26/2021

## 2021-12-26 NOTE — ED NOTES
Pt transferred from [facility]. Pt is having a NSTEMI and there was no cardiology on call at [facility]. Pt BP upon arrival was 146/72, HR was 80 sinus rhythm, and O2 sats on RA was 95%. Patient reports having some SOB but no chest pain at the moment. Pt received 40mg lasix, ASA, nitro paste and 80mg lovenox.  BNP and troponin elevated

## 2021-12-26 NOTE — ED PROVIDER NOTES
EMERGENCY DEPARTMENT HISTORY AND PHYSICAL EXAM        Date: 12/26/2021  Patient Name: Savana Cuellar    History of Presenting Illness     Chief Complaint   Patient presents with    Chest Pain    Transfer Of Care       History Provided By: Patient, ED physician    HPI: Savana Cuellar, 66 y.o. female, diabetes, heart failure, kidney disease, and hypertension who presents with dyspnea. Symptoms started on 12/24/2021 in the evening. Denies any cough, chest pain, fever, wheezing. She has had some bilateral leg edema. States that her last stress test was about 15 years ago. She believes that she had a cardiac catheterization in March 3059 but is uncertain. States that it was normal.  She lives in [place] which is where her doctors are located. PCP: None    Current Outpatient Medications   Medication Sig Dispense Refill    amLODIPine-atorvastatin (CADUET) 10-10 mg per tablet Take 1 Tablet by mouth daily.  aspirin 81 mg chewable tablet Take 81 mg by mouth daily.  cetirizine (ZYRTEC) 10 mg tablet Take 10 mg by mouth.  clopidogreL (PLAVIX) 75 mg tab Take 75 mg by mouth.  hydrALAZINE (APRESOLINE) 25 mg tablet Take 25 mg by mouth three (3) times daily.  insulin aspart U-100 (NOVOLOG) 100 unit/mL (3 mL) inpn by SubCUTAneous route Before breakfast, lunch, dinner and at bedtime.  isosorbide mononitrate ER (IMDUR) 30 mg tablet Take 30 mg by mouth daily.  metoprolol succinate (TOPROL-XL) 50 mg XL tablet Take 50 mg by mouth daily.  nitroglycerin (NITROSTAT) 0.4 mg SL tablet 0.4 mg by SubLINGual route every five (5) minutes as needed for Chest Pain. Up to 3 doses.  ranolazine ER (RANEXA) 500 mg SR tablet Take 500 mg by mouth two (2) times a day.  rosuvastatin (CRESTOR) 20 mg tablet Take 20 mg by mouth nightly.          Past History     Past Medical History:  Past Medical History:   Diagnosis Date    CAD (coronary artery disease)     Diabetes ([address]     Heart failure ([facility] [address]     Hypertension        Past Surgical History:  Past Surgical History:   Procedure Laterality Date    [place] CARDIAC SURG PROCEDURE UNLIST         Family History:  History reviewed. No pertinent family history. Social History:  Social History     Tobacco Use    Smoking status: Former Smoker    Smokeless tobacco: Former User     Quit date: 1/1/2018   Substance Use Topics    Alcohol use: Not Currently    Drug use: Never       Allergies: Allergies   Allergen Reactions    Penicillin G Hives           Review of Systems   Review of Systems   Constitutional: Negative for fever. HENT: Negative for congestion. Eyes: Negative for visual disturbance. Respiratory: Positive for shortness of breath. Cardiovascular: Positive for leg swelling. Negative for chest pain. Gastrointestinal: Negative for abdominal pain. Genitourinary: Negative for dysuria. Musculoskeletal: Negative for arthralgias. Skin: Negative for rash. Neurological: Negative for headaches. Physical Exam   Constitutional: No acute distress. Well-nourished. Skin: No rash. ENT: No rhinorrhea. No cough. Head is normocephalic and atraumatic. Eye: No proptosis or conjunctival injections. Respiratory: No apparent respiratory distress. Lung sounds are clear bilaterally without wheezing. No rhonchi. Gastrointestinal: Nondistended. Musculoskeletal: No obvious bony deformities. Cardio: Regular rate and rhythm. No murmurs. Mild leg edema that is nonpitting.     Diagnostic Study Results     Labs -     Recent Results (from the past 24 hour(s))   CBC WITH AUTOMATED DIFF    Collection Time: 12/25/21 10:31 PM   Result Value Ref Range    WBC 11.7 (H) 4.4 - 11.3 K/uL    RBC 2.93 (L) 4.50 - 5.90 M/uL    HGB 7.1 (L) 13.5 - 17.5 g/dL    HCT 21.9 (L) 36 - 46 %    MCV 74.8 (L) 80 - 100 FL    MCH 24.3 (L) 31 - 34 PG    MCHC 32.5 31.0 - 36.0 g/dL    RDW 18.5 (H) 11.5 - 14.5 %    PLATELET 230 712 - 252 K/uL    MPV 8.2 6.5 - 11.5 FL NRBC 0.0  WBC    ABSOLUTE NRBC 0.00 K/uL    NEUTROPHILS 74 42 - 75 %    LYMPHOCYTES 17 (L) 20.5 - 51.1 %    MONOCYTES 7 1.7 - 9.3 %    EOSINOPHILS 1 0.9 - 2.9 %    BASOPHILS 1 0.0 - 2.5 %    ABS. NEUTROPHILS 8.7 (H) 1.8 - 7.7 K/UL    ABS. LYMPHOCYTES 2.0 1.0 - 4.8 K/UL    ABS. MONOCYTES 0.8 0.2 - 2.4 K/UL    ABS. EOSINOPHILS 0.1 0.0 - 0.7 K/UL    ABS. BASOPHILS 0.1 0.0 - 0.2 K/UL   METABOLIC PANEL, COMPREHENSIVE    Collection Time: 12/25/21 10:31 PM   Result Value Ref Range    Sodium 137 136 - 145 mmol/L    Potassium 2.9 (L) 3.5 - 5.1 mmol/L    Chloride 100 97 - 108 mmol/L    CO2 21 21 - 32 mmol/L    Anion gap 16 (H) 5 - 15 mmol/L    Glucose 150 (H) 65 - 100 mg/dL    BUN 62 (H) 6 - 20 mg/dL    Creatinine 5.53 (H) 0.55 - 1.02 mg/dL    BUN/Creatinine ratio 11 (L) 12 - 20      GFR est AA 9 (L) >60 ml/min/1.73m2    GFR est non-AA 8 (L) >60 ml/min/1.73m2    Calcium 9.6 8.5 - 10.1 mg/dL    Bilirubin, total 0.2 0.2 - 1.0 mg/dL    AST (SGOT) 38 (H) 15 - 37 U/L    ALT (SGPT) 28 12 - 78 U/L    Alk.  phosphatase 82 45 - 117 U/L    Protein, total 8.0 6.4 - 8.2 g/dL    Albumin 3.6 3.5 - 5.0 g/dL    Globulin 4.4 (H) 2.0 - 4.0 g/dL    A-G Ratio 0.8 (L) 1.1 - 2.2     NT-PRO BNP    Collection Time: 12/25/21 10:31 PM   Result Value Ref Range    NT pro-BNP 22,262 (H) <125 pg/mL   TROPONIN-HIGH SENSITIVITY    Collection Time: 12/25/21 10:31 PM   Result Value Ref Range    Troponin-High Sensitivity 876 (HH) 0 - 51 ng/L   LACTIC ACID    Collection Time: 12/25/21 10:31 PM   Result Value Ref Range    Lactic acid 1.0 0.4 - 2.0 mmol/L   EKG, 12 LEAD, INITIAL    Collection Time: 12/25/21 10:51 PM   Result Value Ref Range    Ventricular Rate 79 BPM    Atrial Rate 79 BPM    P-R Interval 177 ms    QRS Duration 94 ms    Q-T Interval 389 ms    QTC Calculation (Bezet) 447 ms    Calculated P Axis 64 degrees    Calculated R Axis 55 degrees    Calculated T Axis 155 degrees    Diagnosis       Sinus rhythm  Probable left atrial enlargement  Repol abnrm suggests ischemia, lateral leads  Baseline wander in lead(s) I,II,aVR,V1,V3,V4,V5,V6     NT-PRO BNP    Collection Time: 12/26/21  2:01 AM   Result Value Ref Range    NT pro-BNP 25,810 (H) <125 pg/mL   TROPONIN-HIGH SENSITIVITY    Collection Time: 12/26/21  2:01 AM   Result Value Ref Range    Troponin-High Sensitivity 1,068 (HH) 0 - 51 ng/L       Radiologic Studies -   No orders to display     CT Results  (Last 48 hours)    None        CXR Results  (Last 48 hours)               12/25/21 2221  XR CHEST PORT Final result    Impression:  Mild cardiomegaly with mild changes of cardiac decompensation in the   lungs. No florid CHF. Joe Helder Narrative:  PROCEDURE: XR CHEST PORT       HISTORY:Short of breath       COMPARISON:None           TECHNIQUE: AP portable chest       LIMITATIONS: None       TUBES/LINES: None       LUNG PARENCHYMA/PLEURA: Calcified nodule in the right lung base. Mildly   increased interstitial markings bilaterally. No pleural effusion although there   is some subpleural edema   TRACHEA/BRONCHI: Normal   PULMONARY VESSELS: Mild cephalization of the vasculature   HEART: Mild cardiomegaly. There is evidence of previous cardiac surgery and   stent placement. MEDIASTINUM: Normal   BONE/SOFT TISSUES: No acute process       OTHER: None                 Medical Decision Making and ED Course     I reviewed the available vital signs, nursing notes, past medical history, past surgical history, family history, and social history. Vital Signs - Reviewed the patient's vital signs. Patient Vitals for the past 12 hrs:   Temp Pulse Resp BP SpO2   12/26/21 0228  73 18 132/86 100 %   12/26/21 0150 98.5 °F (36.9 °C) 85 18 (!) 169/71 95 %   12/26/21 0149     95 %       EKG interpretation: Obtained on 12/26/2021 at 0158. Read at [email or web address] by myself. Normal sinus rhythm at rate of 80 bpm.  T wave flattening in aVF and V3. No ST segment abnormalities. Normal axis.     Records Reviewed: History and physical as well as labs and tests from outside facility. Medical Decision Making:   Presented with shortness of breath and leg edema. The differential diagnosis is ACS, CHF exacerbation, COPD exacerbation, pneumonia, non-STEMI. Work-up shows hemoglobin of 7.1, creatinine of 5.53, potassium of 2.9, troponin of 876, and elevated BNP. At outside facility patient received Lovenox, potassium p.o., and Lasix. Transferred here for further care and evaluation for non-STEMI. Disposition     Admitted     Diagnosis     Clinical impression:   1. Anemia, unspecified type    2. NSTEMI (non-ST elevated myocardial infarction) ([facility] [address]    3. Hypokalemia    4. Chronic kidney disease, unspecified CKD stage           Attestation:  Please note that this dictation was completed with [employer], the computer voice recognition software. Quite often unanticipated grammatical, syntax, homophones, and other interpretive errors are inadvertently transcribed by the computer software. Please disregard these errors. Please excuse any errors that have escaped final proofreading. Thank you.   Jessica Givens, DO

## 2021-12-26 NOTE — ASSESSMENT & PLAN NOTE
Hemoglobin on admission of 7.1, baseline is 9  -Transfuse 1 unit packed red blood cells for hemoglobin less than 8 in the setting of coronary artery disease

## 2021-12-26 NOTE — CONSULTS
Patient: Rip Hui MRN: [id number]     YOB: 1950  Age: 66 y.o. Sex: female      Consult requested by: Kallie Reilly MD    Subjective:      Rip Hui is a 66 y.o. female who is being seen for possible possible acute kidney injury. Patient has a past medical history CHF, hypertension, renal cell carcinoma s/p nephrectomy [place] and stenting in the other kidney for? GENO. She presented to the ED with complaints of gradual Scooter worsening dyspnea on exertion. She also reported orthopnea. She provided history of having coronary artery disease for which she underwent CABG and has CHF. Above symptoms started approximately 2 days ago. This was associated with some left-sided chest pain. She also noticed worsening swelling in bilateral lower extremities.    -Patient also mentioned that she had nephrectomy for renal cell carcinoma and she was seeing a nephrologist while in [place]. She was advised to start dialysis sometime soon but she refused to do it at that time.    -Her admission labs were significant for elevated creatinine of 5.5, elevated troponin. She is being admitted to telemetry for NSTEMI. Cardiology has been consulted. -Reviewed patient home medications she was taking Lasix 40 mg once a day, Ranexa, aspirin, atorvastatin, metoprolol etc   Volume depletion - diuretics or bleeding    Patient was seen in the ED, she said she was comfortably sleeping until I woke her up. She said she was having mild difficulty in breathing but was able to sleep well. She was able to talk in full sentences had mild shortness of breath on talking. She was on oxygen supplementation via nasal cannula and saturating well.     Past Medical History:  Past Medical History:   Diagnosis Date    CAD (coronary artery disease)     Diabetes ([facility] [address] [facility]     Heart failure ([facility] [address] [facility]     Hypertension        Past Surgical History:  Past Surgical History:   Procedure Laterality Date    MT CARDIAC SURG PROCEDURE UNLIST         Family History:  Family History   Problem Relation Age of Onset    Hypertension Father        Social History:  Social History     Socioeconomic History    Marital status:      Spouse name: Not on file    Number of children: Not on file    Years of education: Not on file    Highest education level: Not on file   Occupational History    Not on file   Tobacco Use    Smoking status: Former Smoker    Smokeless tobacco: Former User     Quit date: 1/1/2018   Substance and Sexual Activity    Alcohol use: Not Currently    Drug use: Never    Sexual activity: Not on file   Other Topics Concern     Service Not Asked    Blood Transfusions Not Asked    Caffeine Concern Not Asked    Occupational Exposure Not Asked    Hobby Hazards Not Asked    Sleep Concern Not Asked    Stress Concern Not Asked    Weight Concern Not Asked    Special Diet Not Asked    Back Care Not Asked    Exercise Not Asked    Bike Helmet Not Asked   [address] Not Asked    Self-Exams Not Asked   Social History Narrative    Not on file     Social Determinants of Health     Financial Resource Strain:     Difficulty of Paying Living Expenses: Not on file   Food Insecurity:     Worried About Running Out of Food in the Last Year: Not on file    Kathryn of Food in the Last Year: Not on file   Transportation Needs:     Lack of Transportation (Medical): Not on file    Lack of Transportation (Non-Medical):  Not on file   Physical Activity:     Days of Exercise per Week: Not on file    Minutes of Exercise per Session: Not on file   Stress:     Feeling of Stress : Not on file   Social Connections:     Frequency of Communication with Friends and Family: Not on file    Frequency of Social Gatherings with Friends and Family: Not on file    Attends Sabianist Services: Not on file   [employer]s or Organizations: Not on file    Attends Club or Organization Meetings: Not on file    Marital Status: Not on file   Intimate Partner Violence:     Fear of Current or Ex-Partner: Not on file    Emotionally Abused: Not on file    Physically Abused: Not on file    Sexually Abused: Not on file   Housing Stability:     Unable to Pay for Housing in the Last Year: Not on file    Number of [place] in the Last Year: Not on file    Unstable Housing in the Last Year: Not on file       Allergies   Allergen Reactions    Penicillin G Hives       Review of Systems:  No fever or chills. No sore throat. No cough or hemoptysis. + shortness of breath &  chest pain. +  orthopnea     No nausea, vomiting, abdominal pain, melena or hematochezia.    + B/L LE swelling, no joint paints. No muscle aches. No skin changes. No dizziness or lightheadedness. No headaches. Objective:     Vitals:    12/26/21 0940 12/26/21 1010 12/26/21 1040 12/26/21 1141   BP: (!) 172/76 (!) 160/102 (!) 149/71 (!) 168/73   Pulse: 84 85 93 92   Resp: 18 18 21 29   Temp:   98.1 °F (36.7 °C)    SpO2: 98% 98% 100% 97%   Weight:       Height:            Intake and Output:  Current Shift: 12/26 0701 - 12/26 1900  In: 303.3   Out: -   Last three shifts: No intake/output data recorded. Physical Exam:   GENERAL: alert, cooperative, no distress, appears stated age  EYE: negative  THROAT & NECK: normal, no erythema or exudates noted. , and JVD  LUNG: clear to auscultation bilaterally, RT LE lower 1/3 fine rales,  Left side clear   HEART: regular rate and rhythm, S1, S2 normal, no murmur, click, rub or gallop  ABDOMEN: soft, non-tender. Bowel sounds normal. No masses,  no organomegaly   - No fallon  EXTREMITIES:  2+ pitting edema  SKIN: Normal. and no rash or abnormalities  NEUROLOGIC: negative , no gross neuro deficits.   PSYCHIATRIC: non focal    Data Review    Recent Results (from the past 24 hour(s))   CBC WITH AUTOMATED DIFF    Collection Time: 12/25/21 10:31 PM   Result Value Ref Range    WBC 11.7 (H) 4.4 - 11.3 K/uL    RBC 2.93 (L) 4.50 - 5.90 M/uL    HGB 7.1 (L) 13.5 - 17.5 g/dL    HCT 21.9 (L) 36 - 46 %    MCV 74.8 (L) 80 - 100 FL    MCH 24.3 (L) 31 - 34 PG    MCHC 32.5 31.0 - 36.0 g/dL    RDW 18.5 (H) 11.5 - 14.5 %    PLATELET 739 918 - 001 K/uL    MPV 8.2 6.5 - 11.5 FL    NRBC 0.0  WBC    ABSOLUTE NRBC 0.00 K/uL    NEUTROPHILS 74 42 - 75 %    LYMPHOCYTES 17 (L) 20.5 - 51.1 %    MONOCYTES 7 1.7 - 9.3 %    EOSINOPHILS 1 0.9 - 2.9 %    BASOPHILS 1 0.0 - 2.5 %    ABS. NEUTROPHILS 8.7 (H) 1.8 - 7.7 K/UL    ABS. LYMPHOCYTES 2.0 1.0 - 4.8 K/UL    ABS. MONOCYTES 0.8 0.2 - 2.4 K/UL    ABS. EOSINOPHILS 0.1 0.0 - 0.7 K/UL    ABS. BASOPHILS 0.1 0.0 - 0.2 K/UL   METABOLIC PANEL, COMPREHENSIVE    Collection Time: 12/25/21 10:31 PM   Result Value Ref Range    Sodium 137 136 - 145 mmol/L    Potassium 2.9 (L) 3.5 - 5.1 mmol/L    Chloride 100 97 - 108 mmol/L    CO2 21 21 - 32 mmol/L    Anion gap 16 (H) 5 - 15 mmol/L    Glucose 150 (H) 65 - 100 mg/dL    BUN 62 (H) 6 - 20 mg/dL    Creatinine 5.53 (H) 0.55 - 1.02 mg/dL    BUN/Creatinine ratio 11 (L) 12 - 20      GFR est AA 9 (L) >60 ml/min/1.73m2    GFR est non-AA 8 (L) >60 ml/min/1.73m2    Calcium 9.6 8.5 - 10.1 mg/dL    Bilirubin, total 0.2 0.2 - 1.0 mg/dL    AST (SGOT) 38 (H) 15 - 37 U/L    ALT (SGPT) 28 12 - 78 U/L    Alk.  phosphatase 82 45 - 117 U/L    Protein, total 8.0 6.4 - 8.2 g/dL    Albumin 3.6 3.5 - 5.0 g/dL    Globulin 4.4 (H) 2.0 - 4.0 g/dL    A-G Ratio 0.8 (L) 1.1 - 2.2     NT-PRO BNP    Collection Time: 12/25/21 10:31 PM   Result Value Ref Range    NT pro-BNP 22,262 (H) <125 pg/mL   TROPONIN-HIGH SENSITIVITY    Collection Time: 12/25/21 10:31 PM   Result Value Ref Range    Troponin-High Sensitivity 876 (HH) 0 - 51 ng/L   LACTIC ACID    Collection Time: 12/25/21 10:31 PM   Result Value Ref Range    Lactic acid 1.0 0.4 - 2.0 mmol/L   EKG, 12 LEAD, INITIAL    Collection Time: 12/25/21 10:51 PM Result Value Ref Range    Ventricular Rate 79 BPM    Atrial Rate 79 BPM    P-R Interval 177 ms    QRS Duration 94 ms    Q-T Interval 389 ms    QTC Calculation (Bezet) 447 ms    Calculated P Axis 64 degrees    Calculated R Axis 55 degrees    Calculated T Axis 155 degrees    Diagnosis       Sinus rhythm  Probable left atrial enlargement  Repol abnrm suggests ischemia, lateral leads  Baseline wander in lead(s) I,II,aVR,V1,V3,V4,V5,V6     EKG, 12 LEAD, INITIAL    Collection Time: 12/26/21  1:58 AM   Result Value Ref Range    Ventricular Rate 80 BPM    Atrial Rate 80 BPM    P-R Interval 160 ms    QRS Duration 92 ms    Q-T Interval 372 ms    QTC Calculation (Bezet) 429 ms    Calculated P Axis 63 degrees    Calculated R Axis 64 degrees    Calculated T Axis 165 degrees    Diagnosis       Normal sinus rhythm  Marked ST abnormality, possible lateral subendocardial injury  Abnormal ECG  When compared with ECG of 25-DEC-2021 22:51,  PREVIOUS ECG IS PRESENT  Confirmed by PACO MACARIO, [facility] ([id number]) on 12/26/2021 12:16:42 PM     NT-PRO BNP    Collection Time: 12/26/21  2:01 AM   Result Value Ref Range    NT pro-BNP 25,810 (H) <125 pg/mL   TROPONIN-HIGH SENSITIVITY    Collection Time: 12/26/21  2:01 AM   Result Value Ref Range    Troponin-High Sensitivity 1,068 (HH) 0 - 51 ng/L   HEMOGLOBIN A1C WITH EAG    Collection Time: 12/26/21  4:32 AM   Result Value Ref Range    Hemoglobin A1c 5.6 4.0 - 5.6 %    Est. average glucose 114 mg/dL   TROPONIN-HIGH SENSITIVITY    Collection Time: 12/26/21  4:32 AM   Result Value Ref Range    Troponin-High Sensitivity 1,160 (HH) 0 - 51 ng/L   LIPID PANEL    Collection Time: 12/26/21  4:32 AM   Result Value Ref Range    LIPID PROFILE        Cholesterol, total 99 <200 mg/dL    Triglyceride 123 <150 mg/dL    HDL Cholesterol 33 mg/dL    LDL, calculated 41.4 0 - 100 mg/dL    VLDL, calculated 24.6 mg/dL    CHOL/HDL Ratio 3.0 0.0 - 5.0     CBC WITH AUTOMATED DIFF    Collection Time: 12/26/21  4:32 AM Result Value Ref Range    WBC 9.9 3.6 - 11.0 K/uL    RBC 2.73 (L) 3.80 - 5.20 M/uL    HGB 6.6 (L) 11.5 - 16.0 g/dL    HCT 20.9 (L) 35.0 - 47.0 %    MCV 76.6 (L) 80.0 - 99.0 FL    MCH 24.2 (L) 26.0 - 34.0 PG    MCHC 31.6 30.0 - 36.5 g/dL    RDW 17.1 (H) 11.5 - 14.5 %    PLATELET 243 149 - 898 K/uL    MPV 11.1 8.9 - 12.9 FL    NRBC 0.0 0.0  WBC    ABSOLUTE NRBC 0.00 0.00 - 0.01 K/uL    NEUTROPHILS 78 (H) 32 - 75 %    LYMPHOCYTES 15 12 - 49 %    MONOCYTES 6 5 - 13 %    EOSINOPHILS 1 0 - 7 %    BASOPHILS 0 0 - 1 %    IMMATURE GRANULOCYTES 0 0 - 0.5 %    ABS. NEUTROPHILS 7.7 1.8 - 8.0 K/UL    ABS. LYMPHOCYTES 1.5 0.8 - 3.5 K/UL    ABS. MONOCYTES 0.6 0.0 - 1.0 K/UL    ABS. EOSINOPHILS 0.1 0.0 - 0.4 K/UL    ABS. BASOPHILS 0.0 0.0 - 0.1 K/UL    ABS. IMM.  GRANS. 0.0 0.00 - 0.04 K/UL    DF AUTOMATED     METABOLIC PANEL, BASIC    Collection Time: 12/26/21  4:32 AM   Result Value Ref Range    Sodium 139 136 - 145 mmol/L    Potassium 3.2 (L) 3.5 - 5.1 mmol/L    Chloride 108 97 - 108 mmol/L    CO2 21 21 - 32 mmol/L    Anion gap 10 5 - 15 mmol/L    Glucose 137 (H) 65 - 100 mg/dL    BUN 61 (H) 6 - 20 mg/dL    Creatinine 5.60 (H) 0.55 - 1.02 mg/dL    BUN/Creatinine ratio 11 (L) 12 - 20      GFR est AA 9 (L) >60 ml/min/1.73m2    GFR est non-AA 7 (L) >60 ml/min/1.73m2    Calcium 9.5 8.5 - 10.1 mg/dL   MAGNESIUM    Collection Time: 12/26/21  4:32 AM   Result Value Ref Range    Magnesium 2.0 1.6 - 2.4 mg/dL   GLUCOSE, POC    Collection Time: 12/26/21  5:08 AM   Result Value Ref Range    Glucose (POC) 149 (H) 65 - 117 mg/dL    Performed by Tanna Alcala    Collection Time: 12/26/21  5:49 AM   Result Value Ref Range    Crossmatch Expiration 12/29/2021,2359     ABO/Rh(D) Sharol Mcburney Positive     Antibody screen Negative     Unit number [id number]     Blood component type  LR     Unit division 00     Status of unit Issued     [address] to transfuse     Crossmatch result Compatible    ECHO ADULT COMPLETE Collection Time: 12/26/21 10:05 AM   Result Value Ref Range    EF BP 62 55 - 100 %    LV EDV A4C 113 mL    LV ESV A4C 67 mL    IVSd 1.4 (A) 0.6 - 0.9 cm    LVIDd 3.8 (A) 3.9 - 5.3 cm    LVIDs 2.5 cm    LVOT Peak Velocity 0.8 m/s    LVOT Peak Gradient 2 mmHg    LVPWd 0.9 0.6 - 0.9 cm    LV E' Lateral Velocity 7 cm/s    LV E' Septal Velocity 6 cm/s    LV Ejection Fraction A4C 41 %    AV Peak Velocity 1.5 m/s    AV Peak Gradient 8 mmHg    Aortic Root 3.0 cm    LA Diameter 3.9 cm    MR Peak Velocity 5.2 m/s    MR Peak Gradient 110 mmHg    MV Max Velocity 2.0 m/s    MV Peak Gradient 16 mmHg    MV Mean Gradient 5 mmHg    MV VTI 39.0 cm    MV Mean Velocity 1.0 m/s    MV Area by PHT 3.5 cm2    AR Max Velocity 1.4 m/s    Pulmonary Artery EDP 8 mmHg    PV Max Velocity 1.1 m/s    PV Peak Gradient 5 mmHg    Pulm Vein A Duration 85.0 ms    Pulm Vein A Velocity 0.4 m/s    Est. RA Pressure 3 mmHg    RVIDd 2.4 cm    TR Max Velocity 2.88 m/s    TR Peak Gradient 33 mmHg   GLUCOSE, POC    Collection Time: 12/26/21 11:18 AM   Result Value Ref Range    Glucose (POC) 143 (H) 65 - 117 mg/dL    Performed by MARIANNE BOO    TROPONIN-HIGH SENSITIVITY    Collection Time: 12/26/21 11:20 AM   Result Value Ref Range    Troponin-High Sensitivity 941 (HH) 0 - 51 ng/L          Imaging -     1. Cxray - PROCEDURE: XR CHEST PORT     HISTORY:Short of breath     COMPARISON:None        TECHNIQUE: AP portable chest     LIMITATIONS: None     TUBES/LINES: None     LUNG PARENCHYMA/PLEURA: Calcified nodule in the right lung base. Mildly  increased interstitial markings bilaterally. No pleural effusion although there  is some subpleural edema  TRACHEA/BRONCHI: Normal  PULMONARY VESSELS: Mild cephalization of the vasculature  HEART: Mild cardiomegaly. There is evidence of previous cardiac surgery and  stent placement.   MEDIASTINUM: Normal  BONE/SOFT TISSUES: No acute process     OTHER: None     IMPRESSION  Mild cardiomegaly with mild changes of cardiac decompensation in the  lungs. No florid CHF. .        EKG - Normal sinus rhythm   Marked ST abnormality, possible lateral subendocardial injury   Abnormal ECG   When compared with ECG of 25-DEC-2021 22:51,   PREVIOUS ECG IS PRESENT   Confirmed by Mary ANTONIO MD ([id number]) on 12/26/2021 12:16:42 PM     Assessment & Plan:     Hospital Problems  Date Reviewed: 12/26/2021          Codes Class Noted POA    * (Principal) NSTEMI (non-ST elevated myocardial infarction) ([facility] [address] ICD-10-CM: I21.4  ICD-9-CM: 410.70  12/26/2021 Yes        HTN (hypertension) ICD-10-CM: I10  ICD-9-CM: 401.9  12/26/2021 Yes        HLD (hyperlipidemia) ICD-10-CM: E78.5  ICD-9-CM: 272.4  12/26/2021 Yes        Edema ICD-10-CM: R60.9  ICD-9-CM: 782.3  12/26/2021 Yes        Type 2 diabetes mellitus, with long-term current use of insulin (HCC) ICD-10-CM: E11.9, Z79.4  ICD-9-CM: 250.00, V58.67  12/26/2021 Yes        CKD (chronic kidney disease) stage 5, GFR less than 15 ml/min (HCC) ICD-10-CM: N18.5  ICD-9-CM: 585.5  12/26/2021 Yes        Anemia ICD-10-CM: D64.9  ICD-9-CM: 285.9  12/26/2021 Yes              ? JIM on CKD   - Unknown baseline.-  she probably had advanced CKD due to which her nephrologist [place] had discussed about starting dialysis with her. - Currently no acute indications for dialysis. Patient also emphatically stated that she does not want to do dialysis. -We will continue medical management for volume overload. Agree with high dose of Lasix.  -Low-salt diet and fluid restriction.  -If patient agrees for dialysis will contact interventional radiology for permacath placement.  -Ordered iron profile, ferritin PTH vitamin D phosphorus acceptable    Acquired absence of kidney -prior history of renal cell carcinoma status post nephrectomy. Will order a basic ultrasound to evaluate the right kidney. Anemia -likely due to CKD. Will get basic iron profile as above. Received 1 unit of PRBC in the ED.   Would not initiate Epo now because of uncontrolled blood pressure. CHF , NSTEMI -started on heparin drip.  -Most likely patient may need cath in the next 1 to 2 days following which she may need to start on dialysis. Hypertension -blood pressure currently uncontrolled. -Resume home medications and Imdur 30. We will increase Imdur to 60 mg/day. Dm-2 - started on Insulin ,  Monitor accucheks as her po intake is poor . Thank you for the consult will follow the patient very closely . Signed By: Dinorah Mcconnell MD     December 26, 2021      This note was prepared using voice recognition system and is likely to have sound alike errors that may have been overlooked even during proofreading. Please contact the author for any clarifications.

## 2021-12-26 NOTE — PROGRESS NOTES
Hospitalist Progress Note               Daily Progress Note: 12/26/2021      Subjective: The patient is seen for follow up. Is a 41-year-old female with a history of coronary artery disease with CABG in 2001 and PCI as well as non-STEMI, stage V chronic kidney disease with a unilateral kidney, diabetes, history of nephrectomy for renal cell carcinoma who came to the ED late last night with complaints of shortness of breath and left-sided chest pain. Also with lower extremity edema. In the ED she was hypertensive with otherwise stable vital signs. Labs showed a hemoglobin of 7.1, potassium 2.9, creatinine 5.5 which is her baseline. Initial troponin was 876 with a repeat of 1068. Patient is from [place] and previously received all her care at [facility] but she went to [facility] instead because she did not trust the other hospital    Patient was admitted although remains in the ED at this time. Transfusion of 1 unit of blood was ordered. She was started on a heparin drip    Patient is normally followed by a cardiologist in [place]. Her nephrologist has previously recommended that she start dialysis but patient has refused    I was contacted a short while ago and notified that patient was having increased shortness of breath since transfusion was started.   Additional IV furosemide was ordered    Patient is currently on a nasal cannula, having some tachypnea and shortness of breath to the point where she has to sit straight up in her bed to be comfortable    Her transfusion has just completed    She continues to state that she does not want hemodialysis      Problem List:  Problem List as of 12/26/2021 Date Reviewed: 12/26/2021          Codes Class Noted - Resolved    * (Principal) NSTEMI (non-ST elevated myocardial infarction) ([facility] [address] ICD-10-CM: I21.4  ICD-9-CM: 410.70  12/26/2021 - Present        HTN (hypertension) ICD-10-CM: I10  ICD-9-CM: 401.9  12/26/2021 - Present        HLD (hyperlipidemia) ICD-10-CM: E78.5  ICD-9-CM: 272.4  12/26/2021 - Present        Edema ICD-10-CM: R60.9  ICD-9-CM: 782.3  12/26/2021 - Present        Type 2 diabetes mellitus, with long-term current use of insulin (HCC) ICD-10-CM: E11.9, Z79.4  ICD-9-CM: 250.00, V58.67  12/26/2021 - Present        CKD (chronic kidney disease) stage 5, GFR less than 15 ml/min (HCC) ICD-10-CM: N18.5  ICD-9-CM: 585.5  12/26/2021 - Present        Anemia ICD-10-CM: D64.9  ICD-9-CM: 285.9  12/26/2021 - Present              Medications reviewed  Current Facility-Administered Medications   Medication Dose Route Frequency    glucose chewable tablet 16 g  4 Tablet Oral PRN    dextrose (D50W) injection syrg 12.5-25 g  25-50 mL IntraVENous PRN    glucagon (GLUCAGEN) injection 1 mg  1 mg IntraMUSCular PRN    sodium chloride (NS) flush 5-40 mL  5-40 mL IntraVENous Q8H    sodium chloride (NS) flush 5-40 mL  5-40 mL IntraVENous PRN    insulin glargine (LANTUS) injection 25 Units  25 Units SubCUTAneous QHS    insulin lispro (HUMALOG) injection   SubCUTAneous AC&HS    heparin 25,000 units in D5W 250 ml infusion  12-25 Units/kg/hr (Adjusted) IntraVENous TITRATE    acetaminophen (TYLENOL) tablet 650 mg  650 mg Oral Q4H PRN    heparin (porcine) 1,000 unit/mL injection 4,000 Units  4,000 Units IntraVENous PRN    Or    heparin (porcine) 1,000 unit/mL injection 2,000 Units  2,000 Units IntraVENous PRN    0.9% sodium chloride infusion 250 mL  250 mL IntraVENous PRN    potassium chloride SR (KLOR-CON 10) tablet 40 mEq  40 mEq Oral DAILY    aspirin chewable tablet 81 mg  81 mg Oral DAILY    cetirizine (ZYRTEC) tablet 10 mg  10 mg Oral DAILY    clopidogreL (PLAVIX) tablet 75 mg  75 mg Oral DAILY    hydrALAZINE (APRESOLINE) tablet 25 mg  25 mg Oral TID    isosorbide mononitrate ER (IMDUR) tablet 30 mg  30 mg Oral DAILY    metoprolol succinate (TOPROL-XL) XL tablet 50 mg  50 mg Oral DAILY    nitroglycerin (NITROSTAT) tablet 0.4 mg  0.4 mg SubLINGual Q5MIN PRN    ranolazine ER (RANEXA) tablet 500 mg  500 mg Oral BID    atorvastatin (LIPITOR) tablet 40 mg  40 mg Oral QHS    amLODIPine (NORVASC) tablet 10 mg  10 mg Oral DAILY    cholecalciferol (VITAMIN D3) (1000 Units /25 mcg) tablet 1,000 Units  1,000 Units Oral DAILY    perflutren lipid microspheres (DEFINITY) 1.1 mg/mL contrast injection        furosemide (LASIX) injection 80 mg  80 mg IntraVENous BID     Current Outpatient Medications   Medication Sig    amLODIPine (NORVASC) 10 mg tablet 1 tablet    cholecalciferol (VITAMIN D3) (1000 Units /25 mcg) tablet Take 1,000 Units by mouth daily.  glipiZIDE SR (GLUCOTROL XL) 10 mg CR tablet Take 10 mg by mouth.  insulin detemir U-100 (LEVEMIR) 100 unit/mL injection 44 Units by SubCUTAneous route At bedtime.  fish oil- omega-3 fatty acids 300-1,000 mg capsule Take 1 Capsule by mouth daily.  aspirin 81 mg chewable tablet Take 81 mg by mouth daily.  cetirizine (ZYRTEC) 10 mg tablet Take 10 mg by mouth.  clopidogreL (PLAVIX) 75 mg tab Take 75 mg by mouth.  hydrALAZINE (APRESOLINE) 25 mg tablet Take 25 mg by mouth three (3) times daily.  insulin aspart U-100 (NOVOLOG) 100 unit/mL (3 mL) inpn by SubCUTAneous route Before breakfast, lunch, dinner and at bedtime.  isosorbide mononitrate ER (IMDUR) 30 mg tablet Take 30 mg by mouth daily.  metoprolol succinate (TOPROL-XL) 50 mg XL tablet Take 50 mg by mouth daily.  nitroglycerin (NITROSTAT) 0.4 mg SL tablet 0.4 mg by SubLINGual route every five (5) minutes as needed for Chest Pain. Up to 3 doses.  ranolazine ER (RANEXA) 500 mg SR tablet Take 500 mg by mouth two (2) times a day.  rosuvastatin (CRESTOR) 20 mg tablet Take 20 mg by mouth nightly. Review of Systems:   A comprehensive review of systems was negative except for that written in the HPI.     Objective:   Physical Exam:     Visit Vitals  BP (!) 168/73   Pulse 92   Temp 98.1 °F (36.7 °C)   Resp 29   Ht 4' 11\" (1.499 m)   Wt 82.6 kg (182 lb)   SpO2 97%   BMI 36.76 kg/m²    O2 Flow Rate (L/min): 2 l/min O2 Device: Nasal cannula    Temp (24hrs), Av.1 °F (36.7 °C), Min:97.6 °F (36.4 °C), Max:98.5 °F (36.9 °C)     07 -  1900  In: 303.3   Out: -    No intake/output data recorded. General:   Awake and alert   Lungs:    Crackles bilateral.   Chest wall:  No tenderness or deformity. Heart:  Regular rate and rhythm, S1, S2 normal, no murmur, click, rub or gallop. Abdomen:   Soft, non-tender. Bowel sounds normal. No masses,  No organomegaly. Extremities: Extremities normal, atraumatic, no cyanosis 3+ pitting edema. Pulses: 2+ and symmetric all extremities. Skin: Skin color, texture, turgor normal. No rashes or lesions   Neurologic: CNII-XII intact. No gross focal deficits         Data Review:       Recent Days:  Recent Labs     21  0432 21  2231   WBC 9.9 11.7*   HGB 6.6* 7.1*   HCT 20.9* 21.9*    291     Recent Labs     21  0432 21  2231    137   K 3.2* 2.9*    100   CO2    * 150*   BUN 61* 62*   CREA 5.60* 5.53*   CA 9.5 9.6   MG 2.0  --    ALB  --  3.6   TBILI  --  0.2   ALT  --  28     No results for input(s): PH, PCO2, PO2, HCO3, FIO2 in the last 72 hours. 24 Hour Results:  Recent Results (from the past 24 hour(s))   CBC WITH AUTOMATED DIFF    Collection Time: 21 10:31 PM   Result Value Ref Range    WBC 11.7 (H) 4.4 - 11.3 K/uL    RBC 2.93 (L) 4.50 - 5.90 M/uL    HGB 7.1 (L) 13.5 - 17.5 g/dL    HCT 21.9 (L) 36 - 46 %    MCV 74.8 (L) 80 - 100 FL    MCH 24.3 (L) 31 - 34 PG    MCHC 32.5 31.0 - 36.0 g/dL    RDW 18.5 (H) 11.5 - 14.5 %    PLATELET 370 657 - 300 K/uL    MPV 8.2 6.5 - 11.5 FL    NRBC 0.0  WBC    ABSOLUTE NRBC 0.00 K/uL    NEUTROPHILS 74 42 - 75 %    LYMPHOCYTES 17 (L) 20.5 - 51.1 %    MONOCYTES 7 1.7 - 9.3 %    EOSINOPHILS 1 0.9 - 2.9 %    BASOPHILS 1 0.0 - 2.5 %    ABS. NEUTROPHILS 8.7 (H) 1.8 - 7.7 K/UL    ABS. LYMPHOCYTES 2.0 1.0 - 4.8 K/UL    ABS. MONOCYTES 0.8 0.2 - 2.4 K/UL    ABS. EOSINOPHILS 0.1 0.0 - 0.7 K/UL    ABS. BASOPHILS 0.1 0.0 - 0.2 K/UL   METABOLIC PANEL, COMPREHENSIVE    Collection Time: 12/25/21 10:31 PM   Result Value Ref Range    Sodium 137 136 - 145 mmol/L    Potassium 2.9 (L) 3.5 - 5.1 mmol/L    Chloride 100 97 - 108 mmol/L    CO2 21 21 - 32 mmol/L    Anion gap 16 (H) 5 - 15 mmol/L    Glucose 150 (H) 65 - 100 mg/dL    BUN 62 (H) 6 - 20 mg/dL    Creatinine 5.53 (H) 0.55 - 1.02 mg/dL    BUN/Creatinine ratio 11 (L) 12 - 20      GFR est AA 9 (L) >60 ml/min/1.73m2    GFR est non-AA 8 (L) >60 ml/min/1.73m2    Calcium 9.6 8.5 - 10.1 mg/dL    Bilirubin, total 0.2 0.2 - 1.0 mg/dL    AST (SGOT) 38 (H) 15 - 37 U/L    ALT (SGPT) 28 12 - 78 U/L    Alk.  phosphatase 82 45 - 117 U/L    Protein, total 8.0 6.4 - 8.2 g/dL    Albumin 3.6 3.5 - 5.0 g/dL    Globulin 4.4 (H) 2.0 - 4.0 g/dL    A-G Ratio 0.8 (L) 1.1 - 2.2     NT-PRO BNP    Collection Time: 12/25/21 10:31 PM   Result Value Ref Range    NT pro-BNP 22,262 (H) <125 pg/mL   TROPONIN-HIGH SENSITIVITY    Collection Time: 12/25/21 10:31 PM   Result Value Ref Range    Troponin-High Sensitivity 876 (HH) 0 - 51 ng/L   LACTIC ACID    Collection Time: 12/25/21 10:31 PM   Result Value Ref Range    Lactic acid 1.0 0.4 - 2.0 mmol/L   EKG, 12 LEAD, INITIAL    Collection Time: 12/25/21 10:51 PM   Result Value Ref Range    Ventricular Rate 79 BPM    Atrial Rate 79 BPM    P-R Interval 177 ms    QRS Duration 94 ms    Q-T Interval 389 ms    QTC Calculation (Bezet) 447 ms    Calculated P Axis 64 degrees    Calculated R Axis 55 degrees    Calculated T Axis 155 degrees    Diagnosis       Sinus rhythm  Probable left atrial enlargement  Repol abnrm suggests ischemia, lateral leads  Baseline wander in lead(s) I,II,aVR,V1,V3,V4,V5,V6     EKG, 12 LEAD, INITIAL    Collection Time: 12/26/21  1:58 AM   Result Value Ref Range    Ventricular Rate 80 BPM    Atrial Rate 80 BPM    P-R Interval 160 ms    QRS Duration 92 ms    Q-T Interval 372 ms    QTC Calculation (Bezet) 429 ms    Calculated P Axis 63 degrees    Calculated R Axis 64 degrees    Calculated T Axis 165 degrees    Diagnosis       Normal sinus rhythm  Marked ST abnormality, possible lateral subendocardial injury  Abnormal ECG  When compared with ECG of 25-DEC-2021 22:51,  PREVIOUS ECG IS PRESENT  Confirmed by Jeremías ANTONIO MD ([id number]) on 12/26/2021 12:16:42 PM     NT-PRO BNP    Collection Time: 12/26/21  2:01 AM   Result Value Ref Range    NT pro-BNP 25,810 (H) <125 pg/mL   TROPONIN-HIGH SENSITIVITY    Collection Time: 12/26/21  2:01 AM   Result Value Ref Range    Troponin-High Sensitivity 1,068 (HH) 0 - 51 ng/L   TROPONIN-HIGH SENSITIVITY    Collection Time: 12/26/21  4:32 AM   Result Value Ref Range    Troponin-High Sensitivity 1,160 (HH) 0 - 51 ng/L   CBC WITH AUTOMATED DIFF    Collection Time: 12/26/21  4:32 AM   Result Value Ref Range    WBC 9.9 3.6 - 11.0 K/uL    RBC 2.73 (L) 3.80 - 5.20 M/uL    HGB 6.6 (L) 11.5 - 16.0 g/dL    HCT 20.9 (L) 35.0 - 47.0 %    MCV 76.6 (L) 80.0 - 99.0 FL    MCH 24.2 (L) 26.0 - 34.0 PG    MCHC 31.6 30.0 - 36.5 g/dL    RDW 17.1 (H) 11.5 - 14.5 %    PLATELET 238 105 - 921 K/uL    MPV 11.1 8.9 - 12.9 FL    NRBC 0.0 0.0  WBC    ABSOLUTE NRBC 0.00 0.00 - 0.01 K/uL    NEUTROPHILS 78 (H) 32 - 75 %    LYMPHOCYTES 15 12 - 49 %    MONOCYTES 6 5 - 13 %    EOSINOPHILS 1 0 - 7 %    BASOPHILS 0 0 - 1 %    IMMATURE GRANULOCYTES 0 0 - 0.5 %    ABS. NEUTROPHILS 7.7 1.8 - 8.0 K/UL    ABS. LYMPHOCYTES 1.5 0.8 - 3.5 K/UL    ABS. MONOCYTES 0.6 0.0 - 1.0 K/UL    ABS. EOSINOPHILS 0.1 0.0 - 0.4 K/UL    ABS. BASOPHILS 0.0 0.0 - 0.1 K/UL    ABS. IMM.  GRANS. 0.0 0.00 - 0.04 K/UL    DF AUTOMATED     METABOLIC PANEL, BASIC    Collection Time: 12/26/21  4:32 AM   Result Value Ref Range    Sodium 139 136 - 145 mmol/L    Potassium 3.2 (L) 3.5 - 5.1 mmol/L    Chloride 108 97 - 108 mmol/L    CO2 21 21 - 32 mmol/L    Anion gap 10 5 - 15 mmol/L    Glucose 137 (H) 65 - 100 mg/dL    BUN 61 (H) 6 - 20 mg/dL    Creatinine 5.60 (H) 0.55 - 1.02 mg/dL    BUN/Creatinine ratio 11 (L) 12 - 20      GFR est AA 9 (L) >60 ml/min/1.73m2    GFR est non-AA 7 (L) >60 ml/min/1.73m2    Calcium 9.5 8.5 - 10.1 mg/dL   MAGNESIUM    Collection Time: 12/26/21  4:32 AM   Result Value Ref Range    Magnesium 2.0 1.6 - 2.4 mg/dL   GLUCOSE, POC    Collection Time: 12/26/21  5:08 AM   Result Value Ref Range    Glucose (POC) 149 (H) 65 - 117 mg/dL    Performed by Tanna Alcala    Collection Time: 12/26/21  5:49 AM   Result Value Ref Range    Crossmatch Expiration 12/29/2021,2359     ABO/Rh(D) O Positive     Antibody screen Negative     Unit number [id number]     Blood component type RC LR     Unit division 00     Status of unit Issued     TRANSFUSION STATUS Ok to transfuse     Crossmatch result Compatible    ECHO ADULT COMPLETE    Collection Time: 12/26/21 10:05 AM   Result Value Ref Range    EF BP 62 55 - 100 %    LV EDV A4C 113 mL    LV ESV A4C 67 mL    IVSd 1.4 (A) 0.6 - 0.9 cm    LVIDd 3.8 (A) 3.9 - 5.3 cm    LVIDs 2.5 cm    LVOT Peak Velocity 0.8 m/s    LVOT Peak Gradient 2 mmHg    LVPWd 0.9 0.6 - 0.9 cm    LV E' Lateral Velocity 7 cm/s    LV E' Septal Velocity 6 cm/s    LV Ejection Fraction A4C 41 %    AV Peak Velocity 1.5 m/s    AV Peak Gradient 8 mmHg    Aortic Root 3.0 cm    LA Diameter 3.9 cm    MR Peak Velocity 5.2 m/s    MR Peak Gradient 110 mmHg    MV Max Velocity 2.0 m/s    MV Peak Gradient 16 mmHg    MV Mean Gradient 5 mmHg    MV VTI 39.0 cm    MV Mean Velocity 1.0 m/s    MV Area by PHT 3.5 cm2    ND Max Velocity 1.4 m/s    Pulmonary Artery EDP 8 mmHg    PV Max Velocity 1.1 m/s    PV Peak Gradient 5 mmHg    Pulm Vein A Duration 85.0 ms    Pulm Vein A Velocity 0.4 m/s    Est. RA Pressure 3 mmHg    RVIDd 2.4 cm    TR Max Velocity 2.88 m/s    TR Peak Gradient 33 mmHg   GLUCOSE, POC    Collection Time: 12/26/21 11:18 AM   Result Value Ref Range Glucose (POC) 143 (H) 65 - 117 mg/dL    Performed by MARIANNE BOO    TROPONIN-HIGH SENSITIVITY    Collection Time: 12/26/21 11:20 AM   Result Value Ref Range    Troponin-High Sensitivity 941 (HH) 0 - 51 ng/L       No orders to display        Assessment:  Acute non-STEMI    Acute fluid overload, likely due to kidney disease    Chronic kidney disease stage V    Unilateral kidney with history of nephrectomy for renal cell carcinoma    History of renal artery stenosis of solitary right kidney    Anemia of chronic kidney disease    Coronary artery disease with history of CABG and PCI    Diabetes mellitus type 2    Hypertension    Hyperlipidemia    Peripheral vascular disease with history of left SFA stenosis    Plan:  Patient was just given an extra 40 mg of furosemide, continue furosemide 80 mg twice daily    Nephrology consultation has been requested. I discussed the case with them    I discussed with patient that diuretics may not be effective enough for treatment of her fluid overload and that dialysis may be indicated, although at this time she is adamant about not wanting dialysis    Await echocardiogram      Care Plan discussed with: Patient/Family, son at the bedside    Disposition: Continued inpatient care    Total time spent with patient: 30 minutes.     Yessica Hi MD

## 2021-12-26 NOTE — ED NOTES
Pt placed on oxygen at 2 lpm for SpO2 reading of 88%. Dr. Ancelmo Keller contacted for son concerns.

## 2021-12-26 NOTE — CONSULTS
Consult    Patient: Pedro Sow MRN: [id number]  SSN: [id number]    YOB: 1950  Age: 66 y.o. Sex: female      Subjective:      Pedro Sow is a 66 y.o. female who is being seen for non-STEMI, congestive heart failure. Patient with history of CAD status post CABG in 2001 and status post PCI 4 years ago. She follows with Dr. Rustam Gruber and [facility] in [place]. Last saw him recently where he started Ranexa. She has a history of diabetes, stage V chronic kidney disease. She has single kidney. Earlier this year she had a nephrectomy for renal cell carcinoma. She did not receive any chemoradiation. She has a stent in the other kidney. She have seen nephrologist who has recommended at some point she starts dialysis but patient has refused dialysis before. She presented with worsening shortness of breath. Denied any nausea, vomiting. She feels cold and chilly. Denied any coughing spells but she has been more short winded. Denied having chest pain or chest tightness. She has bilateral lower limb swelling.     Past Medical History:   Diagnosis Date    CAD (coronary artery disease)     Diabetes ([address]     Heart failure (HCC)     Hypertension      Past Surgical History:   Procedure Laterality Date    [place] CARDIAC SURG PROCEDURE UNLIST        Family History   Problem Relation Age of Onset    Hypertension Father      Social History     Tobacco Use    Smoking status: Former Smoker    Smokeless tobacco: Former User     Quit date: 1/1/2018   Substance Use Topics    Alcohol use: Not Currently      Current Facility-Administered Medications   Medication Dose Route Frequency Provider Last Rate Last Admin    glucose chewable tablet 16 g  4 Tablet Oral PRN Lala Gomez MD        dextrose (D50W) injection syrg 12.5-25 g  25-50 mL IntraVENous PRN Elyssa Garcia MD        glucagon (GLUCAGEN) injection 1 mg  1 mg IntraMUSCular PRN MD [place] sodium chloride (NS) flush 5-40 mL  5-40 mL IntraVENous Q8H Dereck Sullivan MD   10 mL at 12/26/21 4872    sodium chloride (NS) flush 5-40 mL  5-40 mL IntraVENous PRN Dereck Sullivan MD        insulin glargine (LANTUS) injection 25 Units  25 Units SubCUTAneous QHS Erica Garcia MD        insulin lispro (HUMALOG) injection   SubCUTAneous AC&HS Dereck Sullivan MD   2 Units at 12/26/21 1138    heparin 25,000 units in D5W 250 ml infusion  12-25 Units/kg/hr (Adjusted) IntraVENous TITRATE Dereck Sullivan MD        acetaminophen (TYLENOL) tablet 650 mg  650 mg Oral Q4H PRN Dereck Sullivan MD        heparin (porcine) 1,000 unit/mL injection 4,000 Units  4,000 Units IntraVENous PRN Dereck Sullivan MD        Or    heparin (porcine) 1,000 unit/mL injection 2,000 Units  2,000 Units IntraVENous PRN Dereck Sullivan MD        0.9% sodium chloride infusion 250 mL  250 mL IntraVENous PRN Dereck Sullivan MD        potassium chloride SR (KLOR-CON 10) tablet 40 mEq  40 mEq Oral DAILY Dereck Sullivan MD   40 mEq at 12/26/21 1139    aspirin chewable tablet 81 mg  81 mg Oral DAILY Dereck Sullivan MD   81 mg at 12/26/21 1139    cetirizine (ZYRTEC) tablet 10 mg  10 mg Oral DAILY Dereck Sullivan MD   10 mg at 12/26/21 1139    clopidogreL (PLAVIX) tablet 75 mg  75 mg Oral DAILY Dereck Sullivan MD   75 mg at 12/26/21 1139    hydrALAZINE (APRESOLINE) tablet 25 mg  25 mg Oral TID Dereck Sullivan MD   25 mg at 12/26/21 1139    isosorbide mononitrate ER (IMDUR) tablet 30 mg  30 mg Oral DAILY Dereck Sullivan MD   30 mg at 12/26/21 1138    metoprolol succinate (TOPROL-XL) XL tablet 50 mg  50 mg Oral DAILY Dereck Sullivan MD   50 mg at 12/26/21 1139    nitroglycerin (NITROSTAT) tablet 0.4 mg  0.4 mg SubLINGual Q5MIN PRN Dereck Sullivan MD        ranolazine ER (RANEXA) tablet 500 mg  500 mg Oral BID Dereck Sullivan MD   500 mg at 12/26/21 1138    atorvastatin (LIPITOR) tablet 40 mg  40 mg Oral QHS Wendy Srivastava MD        amLODIPine (NORVASC) tablet 10 mg  10 mg Oral DAILY Wendy Srivastava MD   10 mg at 12/26/21 1139    cholecalciferol (VITAMIN D3) (1000 Units /25 mcg) tablet 1,000 Units  1,000 Units Oral DAILY Wendy Srivastava MD   1,000 Units at 12/26/21 1139    perflutren lipid microspheres (DEFINITY) 1.1 mg/mL contrast injection              Current Outpatient Medications   Medication Sig Dispense Refill    amLODIPine (NORVASC) 10 mg tablet 1 tablet      cholecalciferol (VITAMIN D3) (1000 Units /25 mcg) tablet Take 1,000 Units by mouth daily.  glipiZIDE SR (GLUCOTROL XL) 10 mg CR tablet Take 10 mg by mouth.  insulin detemir U-100 (LEVEMIR) 100 unit/mL injection 44 Units by SubCUTAneous route At bedtime.  fish oil- omega-3 fatty acids 300-1,000 mg capsule Take 1 Capsule by mouth daily.  aspirin 81 mg chewable tablet Take 81 mg by mouth daily.  cetirizine (ZYRTEC) 10 mg tablet Take 10 mg by mouth.  clopidogreL (PLAVIX) 75 mg tab Take 75 mg by mouth.  hydrALAZINE (APRESOLINE) 25 mg tablet Take 25 mg by mouth three (3) times daily.  insulin aspart U-100 (NOVOLOG) 100 unit/mL (3 mL) inpn by SubCUTAneous route Before breakfast, lunch, dinner and at bedtime.  isosorbide mononitrate ER (IMDUR) 30 mg tablet Take 30 mg by mouth daily.  metoprolol succinate (TOPROL-XL) 50 mg XL tablet Take 50 mg by mouth daily.  nitroglycerin (NITROSTAT) 0.4 mg SL tablet 0.4 mg by SubLINGual route every five (5) minutes as needed for Chest Pain. Up to 3 doses.  ranolazine ER (RANEXA) 500 mg SR tablet Take 500 mg by mouth two (2) times a day.  rosuvastatin (CRESTOR) 20 mg tablet Take 20 mg by mouth nightly. Allergies   Allergen Reactions    Penicillin G Hives       Review of Systems:  A comprehensive review of systems was negative except for that written in the History of Present Illness.     Objective: Vitals:    12/26/21 0940 12/26/21 1010 12/26/21 1040 12/26/21 1141   BP: (!) 172/76 (!) 160/102 (!) 149/71 (!) 168/73   Pulse: 84 85 93 92   Resp: 18 18 21 29   Temp:   98.1 °F (36.7 °C)    SpO2: 98% 98% 100% 97%   Weight:       Height:            Physical Exam:  General:  Alert, cooperative, no distress, appears stated age. Eyes:  Conjunctivae/corneas clear. PERRL, EOMs intact. Fundi benign   Ears:  Normal TMs and external ear canals both ears. Nose: Nares normal. Septum midline. Mucosa normal. No drainage or sinus tenderness. Mouth/Throat: Lips, mucosa, and tongue normal. Teeth and gums normal.   Neck: Supple, symmetrical, trachea midline, no adenopathy, thyroid: no enlargment/tenderness/nodules, no carotid bruit and no JVD. Back:   Symmetric, no curvature. ROM normal. No CVA tenderness. Lungs:    Crackles in the bases   Heart:   Tachycardic   Abdomen:   Soft, non-tender. Bowel sounds normal. No masses,  No organomegaly. Extremities:  Bilateral lower limb swelling. Pulses: 2+ and symmetric all extremities. Skin: Skin color, texture, turgor normal. No rashes or lesions   Lymph nodes: Cervical, supraclavicular, and axillary nodes normal.   Neurologic: CNII-XII intact. Normal strength, sensation and reflexes throughout.          Assessment:     Hospital Problems  Date Reviewed: 12/26/2021          Codes Class Noted POA    * (Principal) NSTEMI (non-ST elevated myocardial infarction) ([facility] [address] ICD-10-CM: I21.4  ICD-9-CM: 410.70  12/26/2021 Yes        HTN (hypertension) ICD-10-CM: I10  ICD-9-CM: 401.9  12/26/2021 Yes        HLD (hyperlipidemia) ICD-10-CM: E78.5  ICD-9-CM: 272.4  12/26/2021 Yes        Edema ICD-10-CM: R60.9  ICD-9-CM: 782.3  12/26/2021 Yes        Type 2 diabetes mellitus, with long-term current use of insulin (HCC) ICD-10-CM: E11.9, Z79.4  ICD-9-CM: 250.00, V58.67  12/26/2021 Yes        CKD (chronic kidney disease) stage 5, GFR less than 15 ml/min (HCC) ICD-10-CM: N18.5  ICD-9-CM: 585.5 12/26/2021 Yes        Anemia ICD-10-CM: D64.9  ICD-9-CM: 285.9  12/26/2021 Yes            Patient is a 79-year-old -American female with:  1. Non-STEMI  2. Heart failure with decompensation  3. Anemia  4. Status post nephrectomy for renal cell carcinoma  5. Peripheral vascular disease status post renal artery stenting  6. Diabetes mellitus  7. Hyperlipidemia  8. Hypertension  9. Hypokalemia  Plan:   EKG showed normal sinus rhythm without acute ischemic changes. She did not have any chest pain. Hemoglobin of 6.6 this morning. Troponin has increased to 1160. Creatinine 5.6, BUN of 61. Chest x-ray showed cardiomegaly. CHF. Echo pending    She is getting blood transfusion now. Continue to trend cardiac markers. We will reach out to her cardiologist tomorrow. I agree on heparin GTT. Currently on aspirin, atorvastatin, Plavix. We will increase the Lasix dose to 80 mg twice a day. We will consult nephrology.     Further recommendation depending on clinical progression and echo finding    Signed By: Arlene Greenwood MD     December 26, 2021

## 2021-12-26 NOTE — ASSESSMENT & PLAN NOTE
Insulin-dependent diabetes mellitus type 2.  On 44 units detemir at home  -25 units Lantus daily in the inpatient setting  -Sliding scale insulin usual sensitivity with Accu-Cheks  -Check hemoglobin A1c

## 2021-12-27 ENCOUNTER — APPOINTMENT (OUTPATIENT)
Dept: ULTRASOUND IMAGING | Age: 71
DRG: 280 | End: 2021-12-27
Attending: INTERNAL MEDICINE
Payer: MEDICARE

## 2021-12-27 LAB
ABO + RH BLD: NORMAL
ALBUMIN SERPL-MCNC: 3.1 G/DL (ref 3.5–5)
ANION GAP SERPL CALC-SCNC: 12 MMOL/L (ref 5–15)
APPEARANCE UR: CLEAR
APTT PPP: 35.6 SEC (ref 21.2–34.1)
APTT PPP: 47.2 SEC (ref 21.2–34.1)
APTT PPP: 74.4 SEC (ref 21.2–34.1)
ATRIAL RATE: 79 BPM
BACTERIA URNS QL MICRO: NEGATIVE /HPF
BASOPHILS # BLD: 0 K/UL (ref 0–0.1)
BASOPHILS NFR BLD: 0 % (ref 0–1)
BILIRUB UR QL: NEGATIVE
BLD PROD TYP BPU: NORMAL
BLOOD GROUP ANTIBODIES SERPL: NEGATIVE
BPU ID: NORMAL
BUN SERPL-MCNC: 61 MG/DL (ref 6–20)
BUN/CREAT SERPL: 11 (ref 12–20)
CA-I BLD-MCNC: 9.9 MG/DL (ref 8.5–10.1)
CALCULATED P AXIS, ECG09: 64 DEGREES
CALCULATED R AXIS, ECG10: 55 DEGREES
CALCULATED T AXIS, ECG11: 155 DEGREES
CHLORIDE SERPL-SCNC: 109 MMOL/L (ref 97–108)
CO2 SERPL-SCNC: 19 MMOL/L (ref 21–32)
COLOR UR: ABNORMAL
CREAT SERPL-MCNC: 5.67 MG/DL (ref 0.55–1.02)
CREAT UR-MCNC: 91 MG/DL
CROSSMATCH RESULT,%XM: NORMAL
DIAGNOSIS, 93000: NORMAL
DIFFERENTIAL METHOD BLD: ABNORMAL
EOSINOPHIL # BLD: 0 K/UL (ref 0–0.4)
EOSINOPHIL NFR BLD: 0 % (ref 0–7)
ERYTHROCYTE [DISTWIDTH] IN BLOOD BY AUTOMATED COUNT: 17.5 % (ref 11.5–14.5)
GLUCOSE BLD STRIP.AUTO-MCNC: 122 MG/DL (ref 65–117)
GLUCOSE BLD STRIP.AUTO-MCNC: 127 MG/DL (ref 65–117)
GLUCOSE BLD STRIP.AUTO-MCNC: 132 MG/DL (ref 65–117)
GLUCOSE SERPL-MCNC: 138 MG/DL (ref 65–100)
GLUCOSE UR STRIP.AUTO-MCNC: NEGATIVE MG/DL
HCT VFR BLD AUTO: 24.8 % (ref 35–47)
HGB BLD-MCNC: 8 G/DL (ref 11.5–16)
HGB UR QL STRIP: ABNORMAL
IMM GRANULOCYTES # BLD AUTO: 0.1 K/UL (ref 0–0.04)
IMM GRANULOCYTES NFR BLD AUTO: 1 % (ref 0–0.5)
KETONES UR QL STRIP.AUTO: NEGATIVE MG/DL
LEUKOCYTE ESTERASE UR QL STRIP.AUTO: ABNORMAL
LYMPHOCYTES # BLD: 1.1 K/UL (ref 0.8–3.5)
LYMPHOCYTES NFR BLD: 10 % (ref 12–49)
MAGNESIUM SERPL-MCNC: 1.9 MG/DL (ref 1.6–2.4)
MCH RBC QN AUTO: 25.2 PG (ref 26–34)
MCHC RBC AUTO-ENTMCNC: 32.3 G/DL (ref 30–36.5)
MCV RBC AUTO: 78.2 FL (ref 80–99)
MONOCYTES # BLD: 0.9 K/UL (ref 0–1)
MONOCYTES NFR BLD: 7 % (ref 5–13)
NEUTS SEG # BLD: 9.9 K/UL (ref 1.8–8)
NEUTS SEG NFR BLD: 82 % (ref 32–75)
NITRITE UR QL STRIP.AUTO: NEGATIVE
NRBC # BLD: 0 K/UL (ref 0–0.01)
NRBC BLD-RTO: 0 PER 100 WBC
P-R INTERVAL, ECG05: 177 MS
PERFORMED BY, TECHID: ABNORMAL
PH UR STRIP: 5 [PH] (ref 5–8)
PHOSPHATE SERPL-MCNC: 3.7 MG/DL (ref 2.6–4.7)
PHOSPHATE SERPL-MCNC: 3.8 MG/DL (ref 2.6–4.7)
PLATELET # BLD AUTO: 244 K/UL (ref 150–400)
PMV BLD AUTO: 11.2 FL (ref 8.9–12.9)
POTASSIUM SERPL-SCNC: 3.2 MMOL/L (ref 3.5–5.1)
PROT UR STRIP-MCNC: 100 MG/DL
PROT UR-MCNC: 154 MG/DL (ref 0–11.9)
PROT/CREAT UR-RTO: 1.7
Q-T INTERVAL, ECG07: 389 MS
QRS DURATION, ECG06: 94 MS
QTC CALCULATION (BEZET), ECG08: 447 MS
RBC # BLD AUTO: 3.17 M/UL (ref 3.8–5.2)
RBC #/AREA URNS HPF: ABNORMAL /HPF (ref 0–5)
SODIUM SERPL-SCNC: 140 MMOL/L (ref 136–145)
SP GR UR REFRACTOMETRY: 1.01 (ref 1–1.03)
SPECIMEN EXP DATE BLD: NORMAL
STATUS OF UNIT,%ST: NORMAL
THERAPEUTIC RANGE,PTTT: ABNORMAL SEC (ref 82–109)
TRANSFUSION STATUS PATIENT QL: NORMAL
TROPONIN-HIGH SENSITIVITY: 1191 NG/L (ref 0–51)
UNIT DIVISION, %UDIV: 0
UROBILINOGEN UR QL STRIP.AUTO: 0.1 EU/DL (ref 0.1–1)
VENTRICULAR RATE, ECG03: 79 BPM
WBC # BLD AUTO: 12.1 K/UL (ref 3.6–11)
WBC URNS QL MICRO: ABNORMAL /HPF (ref 0–4)

## 2021-12-27 PROCEDURE — 85730 THROMBOPLASTIN TIME PARTIAL: CPT

## 2021-12-27 PROCEDURE — 76770 US EXAM ABDO BACK WALL COMP: CPT

## 2021-12-27 PROCEDURE — 74011250636 HC RX REV CODE- 250/636: Performed by: INTERNAL MEDICINE

## 2021-12-27 PROCEDURE — 65270000029 HC RM PRIVATE

## 2021-12-27 PROCEDURE — 36415 COLL VENOUS BLD VENIPUNCTURE: CPT

## 2021-12-27 PROCEDURE — 83540 ASSAY OF IRON: CPT

## 2021-12-27 PROCEDURE — 74011250637 HC RX REV CODE- 250/637: Performed by: INTERNAL MEDICINE

## 2021-12-27 PROCEDURE — 74011250637 HC RX REV CODE- 250/637: Performed by: HOSPITALIST

## 2021-12-27 PROCEDURE — 85025 COMPLETE CBC W/AUTO DIFF WBC: CPT

## 2021-12-27 PROCEDURE — 82728 ASSAY OF FERRITIN: CPT

## 2021-12-27 PROCEDURE — 74011250636 HC RX REV CODE- 250/636: Performed by: HOSPITALIST

## 2021-12-27 PROCEDURE — 83970 ASSAY OF PARATHORMONE: CPT

## 2021-12-27 PROCEDURE — 82962 GLUCOSE BLOOD TEST: CPT

## 2021-12-27 PROCEDURE — 84100 ASSAY OF PHOSPHORUS: CPT

## 2021-12-27 PROCEDURE — 82306 VITAMIN D 25 HYDROXY: CPT

## 2021-12-27 PROCEDURE — 83735 ASSAY OF MAGNESIUM: CPT

## 2021-12-27 PROCEDURE — 80069 RENAL FUNCTION PANEL: CPT

## 2021-12-27 RX ORDER — METOLAZONE 5 MG/1
5 TABLET ORAL 2 TIMES DAILY
Status: DISCONTINUED | OUTPATIENT
Start: 2021-12-27 | End: 2021-12-30

## 2021-12-27 RX ORDER — HYDRALAZINE HYDROCHLORIDE 50 MG/1
50 TABLET, FILM COATED ORAL 3 TIMES DAILY
Status: DISCONTINUED | OUTPATIENT
Start: 2021-12-27 | End: 2021-12-31

## 2021-12-27 RX ADMIN — HEPARIN SODIUM 2000 UNITS: 1000 INJECTION, SOLUTION INTRAVENOUS; SUBCUTANEOUS at 09:10

## 2021-12-27 RX ADMIN — SODIUM CHLORIDE, PRESERVATIVE FREE 10 ML: 5 INJECTION INTRAVENOUS at 14:00

## 2021-12-27 RX ADMIN — CETIRIZINE HYDROCHLORIDE 10 MG: 10 TABLET, FILM COATED ORAL at 09:10

## 2021-12-27 RX ADMIN — HYDRALAZINE HYDROCHLORIDE 25 MG: 25 TABLET, FILM COATED ORAL at 09:10

## 2021-12-27 RX ADMIN — HEPARIN SODIUM 2000 UNITS: 1000 INJECTION, SOLUTION INTRAVENOUS; SUBCUTANEOUS at 22:10

## 2021-12-27 RX ADMIN — ISOSORBIDE MONONITRATE 60 MG: 60 TABLET, EXTENDED RELEASE ORAL at 09:39

## 2021-12-27 RX ADMIN — POTASSIUM CHLORIDE 40 MEQ: 750 TABLET, FILM COATED, EXTENDED RELEASE ORAL at 09:10

## 2021-12-27 RX ADMIN — Medication 1000 UNITS: at 09:10

## 2021-12-27 RX ADMIN — ATORVASTATIN CALCIUM 40 MG: 40 TABLET, FILM COATED ORAL at 22:08

## 2021-12-27 RX ADMIN — RANOLAZINE 500 MG: 500 TABLET, FILM COATED, EXTENDED RELEASE ORAL at 09:39

## 2021-12-27 RX ADMIN — METOLAZONE 5 MG: 5 TABLET ORAL at 22:08

## 2021-12-27 RX ADMIN — FUROSEMIDE 80 MG: 10 INJECTION, SOLUTION INTRAMUSCULAR; INTRAVENOUS at 09:10

## 2021-12-27 RX ADMIN — SODIUM CHLORIDE, PRESERVATIVE FREE 10 ML: 5 INJECTION INTRAVENOUS at 06:00

## 2021-12-27 RX ADMIN — HYDRALAZINE HYDROCHLORIDE 25 MG: 25 TABLET, FILM COATED ORAL at 16:00

## 2021-12-27 RX ADMIN — HEPARIN SODIUM AND DEXTROSE 12 UNITS/KG/HR: 10000; 5 INJECTION INTRAVENOUS at 00:32

## 2021-12-27 RX ADMIN — CLOPIDOGREL BISULFATE 75 MG: 75 TABLET ORAL at 09:10

## 2021-12-27 RX ADMIN — METOPROLOL SUCCINATE 50 MG: 50 TABLET, EXTENDED RELEASE ORAL at 09:10

## 2021-12-27 RX ADMIN — AMLODIPINE BESYLATE 10 MG: 5 TABLET ORAL at 09:10

## 2021-12-27 RX ADMIN — ASPIRIN 81 MG CHEWABLE TABLET 81 MG: 81 TABLET CHEWABLE at 09:09

## 2021-12-27 RX ADMIN — RANOLAZINE 500 MG: 500 TABLET, FILM COATED, EXTENDED RELEASE ORAL at 22:08

## 2021-12-27 RX ADMIN — SODIUM CHLORIDE, PRESERVATIVE FREE 10 ML: 5 INJECTION INTRAVENOUS at 22:08

## 2021-12-27 RX ADMIN — HYDRALAZINE HYDROCHLORIDE 50 MG: 50 TABLET, FILM COATED ORAL at 22:08

## 2021-12-27 RX ADMIN — FUROSEMIDE 80 MG: 10 INJECTION, SOLUTION INTRAMUSCULAR; INTRAVENOUS at 22:08

## 2021-12-27 NOTE — PROGRESS NOTES
Reason for Admission:   NSTEMI                    RUR Score:   19%               PCP: First and Last name:  Nolan Driscoll     Name of Practice:    Are you a current patient: Yes/No: Yes   Approximate date of last visit: Seen last month. Can you participate in a virtual visit if needed:     Do you (patient/family) have any concerns for transition/discharge? No                Plan for utilizing home health: None @ this time/uses no DME. Current Advanced Directive/Advance Care Plan:  Full Code      Healthcare Decision Maker:             Primary Decision Maker: Galdino Hernández - [phone]    Transition of Care Plan: D/C Plan is home alone & son will transport home upon discharge.

## 2021-12-27 NOTE — ACP (ADVANCE CARE PLANNING)
Advance Care Planning   Healthcare Decision Maker:       Primary Decision Maker: Riley Colon [facility] - [phone]

## 2021-12-27 NOTE — PROGRESS NOTES
Progress Note    Patient: Gt Keith MRN: [id number]  SSN: [id number]    YOB: 1950  Age: 66 y.o. Sex: female      Admit Date: 12/26/2021    LOS: 1 day     Subjective:     She is breathing better today. She responded to Lasix. Denied having any chest pain    Objective:     Vitals:    12/26/21 2247 12/27/21 0046 12/27/21 0258 12/27/21 0600   BP: (!) 167/109 (!) 169/57 (!) 167/82 (!) 186/76   Pulse: 90 94 87 90   Resp: 19 24 20 16   Temp:       SpO2: 97% 100% 99% 100%   Weight:       Height:            Intake and Output:  Current Shift: No intake/output data recorded. Last three shifts: 12/25 1901 - 12/27 0700  In: 303.3   Out: -     Physical Exam:   General:  Alert, cooperative, no distress, appears stated age. Eyes:  Conjunctivae/corneas clear. PERRL, EOMs intact. Fundi benign   Ears:  Normal TMs and external ear canals both ears. Nose: Nares normal. Septum midline. Mucosa normal. No drainage or sinus tenderness. Mouth/Throat: Lips, mucosa, and tongue normal. Teeth and gums normal.   Neck: Supple, symmetrical, trachea midline, no adenopathy, thyroid: no enlargment/tenderness/nodules, no carotid bruit and no JVD. Back:   Symmetric, no curvature. ROM normal. No CVA tenderness. Lungs:   Clear to auscultation bilaterally. Heart:  Regular rate and rhythm, S1, S2 normal, no murmur, click, rub or gallop. Abdomen:   Soft, non-tender. Bowel sounds normal. No masses,  No organomegaly. Extremities: Extremities normal, atraumatic, no cyanosis or edema. Pulses: 2+ and symmetric all extremities. Skin: Skin color, texture, turgor normal. No rashes or lesions   Lymph nodes: Cervical, supraclavicular, and axillary nodes normal.   Neurologic: CNII-XII intact. Normal strength, sensation and reflexes throughout. Lab/Data Review: All lab results for the last 24 hours reviewed.          Assessment:     Principal Problem:    NSTEMI (non-ST elevated myocardial infarction) ([facility] [address] (12/26/2021)    Active Problems:    HTN (hypertension) (12/26/2021)      HLD (hyperlipidemia) (12/26/2021)      Edema (12/26/2021)      Type 2 diabetes mellitus, with long-term current use of insulin ([facility] [address] (12/26/2021)      CKD (chronic kidney disease) stage 5, GFR less than 15 ml/min (HCC) (12/26/2021)      Anemia (12/26/2021)      Acquired absence of kidney (12/26/2021)         Patient is a 69-year-old -American female with:  1. Non-STEMI  2. Heart failure with decompensation  3. Anemia  4. Status post nephrectomy for renal cell carcinoma  5. Peripheral vascular disease status post renal artery stenting  6. Diabetes mellitus  7. Hyperlipidemia  8. Hypertension  9. Hypokalemia  Plan:     Kidney function is stable. Potassium 3.2. Troponin has a flat response. Anyhow echocardiogram results were discussed with the patient. Echo showed EF of 30 to 35% with apical dyskinesis and inferior and inferolateral hypokinesis. We will try to reach out to her primary cardiologist Jomar Chanel to see regarding her echocardiogram finding of this is a chronic. If she will undergo cardiac catheterization very high likely that she will need dialysis. Continue diuresis and medical management. Heparin gtt. for another 24 hours.   Currently on aspirin Plavix and atorvastatin    Signed By: Berhane Jarquin MD     December 27, 2021

## 2021-12-27 NOTE — PROGRESS NOTES
12/27/21. PCP is BRUCE Yao - seen last month. D/C Plan is home alone & zahida Niels Potts @ [phone]) will transport pt home upon discharge. Pt uses no DME/no home health.

## 2021-12-27 NOTE — ED NOTES
Rounded on pt at this time, pt alert and oriented x4, has a pure wick on that is working well and offered some ice chips.

## 2021-12-27 NOTE — PROGRESS NOTES
Patient: Hector Lobo MRN: [id number]     YOB: 1950  Age: 66 y.o. Sex: female      Consult requested by: Jericho Albrecht MD    Subjective: The patient is seen in the room.   She is still complaining of shortness of breath especially orthopnea  She is noticed to have lower extremity swelling  On IV Lasix 80 mg twice a day  Creatinine is 5.67    Past Medical History:  Past Medical History:   Diagnosis Date    CAD (coronary artery disease)     [facility] [address]     Heart failure ([facility] [address]     Hypertension        Past Surgical History:  Past Surgical History:   Procedure Laterality Date    OR CARDIAC SURG PROCEDURE UNLIST         Family History:  Family History   Problem Relation Age of Onset    Hypertension Father        Social History:  Social History     Socioeconomic History    Marital status:      Spouse name: Not on file    Number of children: Not on file    Years of education: Not on file    Highest education level: Not on file   Occupational History    Not on file   Tobacco Use    Smoking status: Former Smoker    Smokeless tobacco: Former User     Quit date: 1/1/2018   Substance and Sexual Activity    Alcohol use: Not Currently    Drug use: Never    Sexual activity: Not on file   Other Topics Concern     Service Not Asked    Blood Transfusions Not Asked    Caffeine Concern Not Asked    Occupational Exposure Not Asked    Hobby Hazards Not Asked    Sleep Concern Not Asked    Stress Concern Not Asked    Weight Concern Not Asked    Special Diet Not Asked    Back Care Not Asked    Exercise Not Asked    Bike Helmet Not Asked   [address] [facility] [facility] Not Asked    Self-Exams Not Asked   Social History Narrative    Not on file     Social Determinants of Health     Financial Resource Strain:     Difficulty of Paying Living Expenses: Not on file   Food Insecurity:     Worried About Running Out of Food in the Last Year: Not on file    Ran Out of Food in the Last Year: Not on file   Transportation Needs:     Lack of Transportation (Medical): Not on file    Lack of Transportation (Non-Medical): Not on file   Physical Activity:     Days of Exercise per Week: Not on file    Minutes of Exercise per Session: Not on file   Stress:     Feeling of Stress : Not on file   Social Connections:     Frequency of Communication with Friends and Family: Not on file    Frequency of Social Gatherings with Friends and Family: Not on file    Attends Church Services: Not on file    Active Member of [address] [place] [address] [employer] or Organizations: Not on file    Attends Club or Organization Meetings: Not on file    Marital Status: Not on file   Intimate Partner Violence:     Fear of Current or Ex-Partner: Not on file    Emotionally Abused: Not on file    Physically Abused: Not on file    Sexually Abused: Not on file   Housing Stability:     Unable to Pay for Housing in the Last Year: Not on file    Number of [place] in the Last Year: Not on file    Unstable Housing in the Last Year: Not on file       Allergies   Allergen Reactions    Penicillin G Hives       Review of Systems:  No fever or chills. No sore throat. No cough or hemoptysis. + shortness of breath &  chest pain. +  orthopnea     No nausea, vomiting, abdominal pain, melena or hematochezia.    + B/L LE swelling, no joint paints. No muscle aches. No skin changes. No dizziness or lightheadedness. No headaches. Objective:     Vitals:    12/27/21 0800 12/27/21 0910 12/27/21 1500 12/27/21 1730   BP:  (!) 195/90 (!) 181/76 (!) 164/103   Pulse:  96 89 87   Resp:  22 23 20   Temp:       SpO2: 97% 98% 100% 99%   Weight:       Height:            Intake and Output:  Current Shift: No intake/output data recorded.   Last three shifts: 12/25 1901 - 12/27 0700  In: 303.3   Out: -     Physical Exam:   GENERAL: alert, cooperative, no distress, appears stated age  EYE: negative  THROAT & NECK: normal, no erythema or exudates noted. , and JVD  LUNG: clear to auscultation bilaterally, RT LE lower 1/3 fine rales,  Left side clear   HEART: regular rate and rhythm, S1, S2 normal, no murmur, click, rub or gallop  ABDOMEN: soft, non-tender. Bowel sounds normal. No masses,  no organomegaly   - No fallon  EXTREMITIES:  2+ pitting edema  SKIN: Normal. and no rash or abnormalities  NEUROLOGIC: negative , no gross neuro deficits. PSYCHIATRIC: non focal    Data Review    Recent Results (from the past 24 hour(s))   GLUCOSE, POC    Collection Time: 12/26/21 10:18 PM   Result Value Ref Range    Glucose (POC) 150 (H) 65 - 117 mg/dL    Performed by Paco Rodriguez    TROPONIN-HIGH SENSITIVITY    Collection Time: 12/26/21 11:24 PM   Result Value Ref Range    Troponin-High Sensitivity 1,191 (HH) 0 - 51 ng/L   URINALYSIS W/MICROSCOPIC    Collection Time: 12/26/21 11:24 PM   Result Value Ref Range    Color Yellow/Straw      Appearance Clear Clear      Specific gravity 1.011 1.003 - 1.030      pH (UA) 5.0 5.0 - 8.0      Protein 100 (A) Negative mg/dL    Glucose Negative Negative mg/dL    Ketone Negative Negative mg/dL    Bilirubin Negative Negative      Blood Large (A) Negative      Urobilinogen 0.1 0.1 - 1.0 EU/dL    Nitrites Negative Negative      Leukocyte Esterase Trace (A) Negative      WBC 0-4 0 - 4 /hpf    RBC 0-5 0 - 5 /hpf    Bacteria Negative Negative /hpf   PROTEIN/CREATININE RATIO, URINE    Collection Time: 12/26/21 11:24 PM   Result Value Ref Range    Protein, urine random 154 (H) 0.0 - 11.9 mg/dL    Creatinine, urine 91.00 mg/dL    Protein/Creat.  urine Ratio 1.7     PTT    Collection Time: 12/26/21 11:38 PM   Result Value Ref Range    aPTT 35.6 (H) 21.2 - 34.1 sec    aPTT, therapeutic range   82 - 109 sec   CBC WITH AUTOMATED DIFF    Collection Time: 12/27/21  5:55 AM   Result Value Ref Range    WBC 12.1 (H) 3.6 - 11.0 K/uL    RBC 3.17 (L) 3.80 - 5.20 M/uL    HGB 8.0 (L) 11.5 - 16.0 g/dL    HCT 24.8 (L) 35.0 - 47.0 %    MCV 78.2 (L) 80.0 - 99.0 FL    MCH 25.2 (L) 26.0 - 34.0 PG    MCHC 32.3 30.0 - 36.5 g/dL    RDW 17.5 (H) 11.5 - 14.5 %    PLATELET 187 754 - 665 K/uL    MPV 11.2 8.9 - 12.9 FL    NRBC 0.0 0.0  WBC    ABSOLUTE NRBC 0.00 0.00 - 0.01 K/uL    NEUTROPHILS 82 (H) 32 - 75 %    LYMPHOCYTES 10 (L) 12 - 49 %    MONOCYTES 7 5 - 13 %    EOSINOPHILS 0 0 - 7 %    BASOPHILS 0 0 - 1 %    IMMATURE GRANULOCYTES 1 (H) 0 - 0.5 %    ABS. NEUTROPHILS 9.9 (H) 1.8 - 8.0 K/UL    ABS. LYMPHOCYTES 1.1 0.8 - 3.5 K/UL    ABS. MONOCYTES 0.9 0.0 - 1.0 K/UL    ABS. EOSINOPHILS 0.0 0.0 - 0.4 K/UL    ABS. BASOPHILS 0.0 0.0 - 0.1 K/UL    ABS. IMM.  GRANS. 0.1 (H) 0.00 - 0.04 K/UL    DF AUTOMATED     PHOSPHORUS    Collection Time: 12/27/21  5:55 AM   Result Value Ref Range    Phosphorus 3.7 2.6 - 4.7 mg/dL   RENAL FUNCTION PANEL    Collection Time: 12/27/21  5:55 AM   Result Value Ref Range    Sodium 140 136 - 145 mmol/L    Potassium 3.2 (L) 3.5 - 5.1 mmol/L    Chloride 109 (H) 97 - 108 mmol/L    CO2 19 (L) 21 - 32 mmol/L    Anion gap 12 5 - 15 mmol/L    Glucose 138 (H) 65 - 100 mg/dL    BUN 61 (H) 6 - 20 mg/dL    Creatinine 5.67 (H) 0.55 - 1.02 mg/dL    BUN/Creatinine ratio 11 (L) 12 - 20      GFR est AA 9 (L) >60 ml/min/1.73m2    GFR est non-AA 7 (L) >60 ml/min/1.73m2    Calcium 9.9 8.5 - 10.1 mg/dL    Phosphorus 3.8 2.6 - 4.7 mg/dL    Albumin 3.1 (L) 3.5 - 5.0 g/dL   MAGNESIUM    Collection Time: 12/27/21  5:55 AM   Result Value Ref Range    Magnesium 1.9 1.6 - 2.4 mg/dL   PTT    Collection Time: 12/27/21  6:00 AM   Result Value Ref Range    aPTT 47.2 (H) 21.2 - 34.1 sec    aPTT, therapeutic range   82 - 109 sec   GLUCOSE, POC    Collection Time: 12/27/21  9:29 AM   Result Value Ref Range    Glucose (POC) 132 (H) 65 - 117 mg/dL    Performed by Manuel Medel, POC    Collection Time: 12/27/21 12:55 PM   Result Value Ref Range    Glucose (POC) 127 (H) 65 - 117 mg/dL    Performed by MARIANNE EMA           Imaging -     1. Cxray - PROCEDURE: XR CHEST PORT     HISTORY:Short of breath     COMPARISON:None        TECHNIQUE: AP portable chest     LIMITATIONS: None     TUBES/LINES: None     LUNG PARENCHYMA/PLEURA: Calcified nodule in the right lung base. Mildly  increased interstitial markings bilaterally. No pleural effusion although there  is some subpleural edema  TRACHEA/BRONCHI: Normal  PULMONARY VESSELS: Mild cephalization of the vasculature  HEART: Mild cardiomegaly. There is evidence of previous cardiac surgery and  stent placement. MEDIASTINUM: Normal  BONE/SOFT TISSUES: No acute process     OTHER: None     IMPRESSION  Mild cardiomegaly with mild changes of cardiac decompensation in the  lungs. No florid CHF. .        EKG - Normal sinus rhythm   Marked ST abnormality, possible lateral subendocardial injury   Abnormal ECG   When compared with ECG of 25-DEC-2021 22:51,   PREVIOUS ECG IS PRESENT   Confirmed by Kelechi ANTONIO MDUniversity of Michigan HealthNoah ([id number]) on 12/26/2021 12:16:42 PM     Assessment & Plan:     Hospital Problems  Date Reviewed: 12/26/2021          Codes Class Noted POA    * (Principal) NSTEMI (non-ST elevated myocardial infarction) ([facility] [address] ICD-10-CM: I21.4  ICD-9-CM: 410.70  12/26/2021 Yes        HTN (hypertension) ICD-10-CM: I10  ICD-9-CM: 401.9  12/26/2021 Yes        HLD (hyperlipidemia) ICD-10-CM: E78.5  ICD-9-CM: 272.4  12/26/2021 Yes        Edema ICD-10-CM: R60.9  ICD-9-CM: 782.3  12/26/2021 Yes        Type 2 diabetes mellitus, with long-term current use of insulin (HCC) ICD-10-CM: E11.9, Z79.4  ICD-9-CM: 250.00, V58.67  12/26/2021 Yes        CKD (chronic kidney disease) stage 5, GFR less than 15 ml/min (HCC) ICD-10-CM: N18.5  ICD-9-CM: 585.5  12/26/2021 Yes        Anemia ICD-10-CM: D64.9  ICD-9-CM: 285.9  12/26/2021 Yes        Acquired absence of kidney ICD-10-CM: Z90.5  ICD-9-CM: V45.73  12/26/2021 Unknown            1- JIM on possibly advanced CKD stage unknown:   -Since admission Cr 5.6 with eGFR 9 only  -Unknown baseline.   -she probably had advanced CKD due to which her nephrologist [facility] had discussed about starting dialysis with her.  -will order Renal US (history of RCC and status post nephrectomy). -Clinically volume overload, no uremia  -Currently no emergent indications for dialysis. -I will consider starting her on dialysis in the setting of poor diuretic response or worsening renal functions. - I have already spoken with the patient and she agreed to proceed if dialysis needs to be started. 2-Hypokalemia:  -from loops  -po KCL 40 meq daily      3-Anemia:  -likely due to CKD. -Received 1 unit of PRBC in the ED. -will send iron studies  -will start on Epo sq. 4-NSTEMI   -started on heparin drip.  -Echo LVEF 30-35%  -Cardio possibly planning for CC    5-Hypertension:  -blood pressure currently uncontrolled. -will keep amlodipine 10 mg/Imdur 60 mg/metoprolol 50 mg twice a day and will increase hydralazine 50 mg 3 times daily. 6-CHF:  -Decompensated and fluid overload due to #1  -LVEF 30 to 35%  -On IV Lasix 80 mg twice a day  -Still complaining of orthopnea  -Plan to add metolazone 5 mg twice a day  -Plan to consider dialysis in the setting of poor diuretic response.         Signed By: Rafi Ferrell MD     December 27, 2021

## 2021-12-27 NOTE — ED NOTES
Pt c/o of nausea and feeling sick on her stomach, on call MD Alberto notified and orders received. [id number] Contacted on call MD Alberto and informed him of trop level, no new orders received    0045 Pt moved to hospital bed and given warm blankets.  VS stable, no other complaints at this time

## 2021-12-27 NOTE — ROUTINE PROCESS
TRANSFER - OUT REPORT:    Verbal report given to dwight on Adrianne Amanda  being transferred to  for routine progression of care       Report consisted of patients Situation, Background, Assessment and   Recommendations(SBAR). Information from the following report(s) SBAR was reviewed with the receiving nurse. Lines:   Peripheral IV 12/25/21 Right Antecubital (Active)   Site Assessment Clean, dry, & intact 12/26/21 0212   Phlebitis Assessment 0 12/26/21 0212   Infiltration Assessment 0 12/26/21 0212   Dressing Status Clean, dry, & intact 12/26/21 0212   Dressing Type Transparent 12/26/21 0212   Hub Color/Line Status Blue 12/26/21 0212   Action Taken Blood drawn 12/26/21 0212       Peripheral IV 12/26/21 Left Antecubital (Active)   Site Assessment Clean, dry, & intact 12/27/21 0135   Phlebitis Assessment 0 12/27/21 0135   Infiltration Assessment 0 12/27/21 0135        Opportunity for questions and clarification was provided.       Patient transported with:   Monitor

## 2021-12-27 NOTE — PROGRESS NOTES
Hospitalist Progress Note               Daily Progress Note: 12/27/2021      Subjective: The patient is seen for follow up. Is a 25-year-old female with a history of coronary artery disease with CABG in 2001 and PCI as well as non-STEMI, stage V chronic kidney disease with a unilateral kidney, diabetes, history of nephrectomy for renal cell carcinoma who came to the ED late last night with complaints of shortness of breath and left-sided chest pain. Also with lower extremity edema. In the ED she was hypertensive with otherwise stable vital signs. Labs showed a hemoglobin of 7.1, potassium 2.9, creatinine 5.5 which is her baseline. Initial troponin was 876 with a repeat of 1068. Patient is from [facility] and previously received all her care at [facility] but she went to [facility] instead because she did not trust the other hospital    Patient was admitted although remains in the ED at this time. Transfusion of 1 unit of blood was ordered. She was started on a heparin drip    Patient is normally followed by a cardiologist in [facility]. Her nephrologist has previously recommended that she start dialysis but patient has refused    I was contacted a short while ago and notified that patient was having increased shortness of breath since transfusion was started. Additional IV furosemide was ordered    Patient is currently on a nasal cannula, having some tachypnea and shortness of breath to the point where she has to sit straight up in her bed to be comfortable    Her transfusion has just completed    She continues to state that she does not want hemodialysis      12/27: discussion with patient and son, if she undergoes LHC then she will end up in HD, seen by jorge sanz for another 24 hours.  Cardiology reaching out to her primary cardiologist    Problem List:  Problem List as of 12/27/2021 Date Reviewed: 12/26/2021          Codes Class Noted - Resolved    * (Principal) NSTEMI (non-ST elevated myocardial infarction) [facility]) ICD-10-CM: I21.4  ICD-9-CM: 410.70  12/26/2021 - Present        HTN (hypertension) ICD-10-CM: I10  ICD-9-CM: 401.9  12/26/2021 - Present        HLD (hyperlipidemia) ICD-10-CM: E78.5  ICD-9-CM: 272.4  12/26/2021 - Present        Edema ICD-10-CM: R60.9  ICD-9-CM: 782.3  12/26/2021 - Present        Type 2 diabetes mellitus, with long-term current use of insulin (HCC) ICD-10-CM: E11.9, Z79.4  ICD-9-CM: 250.00, V58.67  12/26/2021 - Present        CKD (chronic kidney disease) stage 5, GFR less than 15 ml/min (HCC) ICD-10-CM: N18.5  ICD-9-CM: 585.5  12/26/2021 - Present        Anemia ICD-10-CM: D64.9  ICD-9-CM: 285.9  12/26/2021 - Present        Acquired absence of kidney ICD-10-CM: Z90.5  ICD-9-CM: V45.73  12/26/2021 - Present              Medications reviewed  Current Facility-Administered Medications   Medication Dose Route Frequency    glucose chewable tablet 16 g  4 Tablet Oral PRN    dextrose (D50W) injection syrg 12.5-25 g  25-50 mL IntraVENous PRN    glucagon (GLUCAGEN) injection 1 mg  1 mg IntraMUSCular PRN    sodium chloride (NS) flush 5-40 mL  5-40 mL IntraVENous Q8H    sodium chloride (NS) flush 5-40 mL  5-40 mL IntraVENous PRN    insulin glargine (LANTUS) injection 25 Units  25 Units SubCUTAneous QHS    insulin lispro (HUMALOG) injection   SubCUTAneous AC&HS    heparin 25,000 units in D5W 250 ml infusion  12-25 Units/kg/hr (Adjusted) IntraVENous TITRATE    acetaminophen (TYLENOL) tablet 650 mg  650 mg Oral Q4H PRN    heparin (porcine) 1,000 unit/mL injection 4,000 Units  4,000 Units IntraVENous PRN    Or    heparin (porcine) 1,000 unit/mL injection 2,000 Units  2,000 Units IntraVENous PRN    0.9% sodium chloride infusion 250 mL  250 mL IntraVENous PRN    potassium chloride SR (KLOR-CON 10) tablet 40 mEq  40 mEq Oral DAILY    aspirin chewable tablet 81 mg  81 mg Oral DAILY    cetirizine (ZYRTEC) tablet 10 mg  10 mg Oral DAILY    clopidogreL (PLAVIX) tablet 75 mg  75 mg Oral DAILY    hydrALAZINE (APRESOLINE) tablet 25 mg  25 mg Oral TID    metoprolol succinate (TOPROL-XL) XL tablet 50 mg  50 mg Oral DAILY    nitroglycerin (NITROSTAT) tablet 0.4 mg  0.4 mg SubLINGual Q5MIN PRN    ranolazine ER (RANEXA) tablet 500 mg  500 mg Oral BID    atorvastatin (LIPITOR) tablet 40 mg  40 mg Oral QHS    amLODIPine (NORVASC) tablet 10 mg  10 mg Oral DAILY    cholecalciferol (VITAMIN D3) (1000 Units /25 mcg) tablet 1,000 Units  1,000 Units Oral DAILY    furosemide (LASIX) injection 80 mg  80 mg IntraVENous BID    isosorbide mononitrate ER (IMDUR) tablet 60 mg  60 mg Oral DAILY    ondansetron (ZOFRAN) injection 4 mg  4 mg IntraVENous Q6H PRN     Current Outpatient Medications   Medication Sig    amLODIPine (NORVASC) 10 mg tablet 1 tablet    cholecalciferol (VITAMIN D3) (1000 Units /25 mcg) tablet Take 1,000 Units by mouth daily.  glipiZIDE SR (GLUCOTROL XL) 10 mg CR tablet Take 10 mg by mouth.  insulin detemir U-100 (LEVEMIR) 100 unit/mL injection 44 Units by SubCUTAneous route At bedtime.  fish oil- omega-3 fatty acids 300-1,000 mg capsule Take 1 Capsule by mouth daily.  aspirin 81 mg chewable tablet Take 81 mg by mouth daily.  cetirizine (ZYRTEC) 10 mg tablet Take 10 mg by mouth.  clopidogreL (PLAVIX) 75 mg tab Take 75 mg by mouth.  hydrALAZINE (APRESOLINE) 25 mg tablet Take 25 mg by mouth three (3) times daily.  insulin aspart U-100 (NOVOLOG) 100 unit/mL (3 mL) inpn by SubCUTAneous route Before breakfast, lunch, dinner and at bedtime.  isosorbide mononitrate ER (IMDUR) 30 mg tablet Take 30 mg by mouth daily.  metoprolol succinate (TOPROL-XL) 50 mg XL tablet Take 50 mg by mouth daily.  nitroglycerin (NITROSTAT) 0.4 mg SL tablet 0.4 mg by SubLINGual route every five (5) minutes as needed for Chest Pain. Up to 3 doses.  ranolazine ER (RANEXA) 500 mg SR tablet Take 500 mg by mouth two (2) times a day.     rosuvastatin (CRESTOR) 20 mg tablet Take 20 mg by mouth nightly. Review of Systems:   A comprehensive review of systems was negative except for that written in the HPI. Objective:   Physical Exam:     Visit Vitals  BP (!) 195/90   Pulse 96   Temp 98.1 °F (36.7 °C)   Resp 22   Ht 4' 11\" (1.499 m)   Wt 82.6 kg (182 lb)   SpO2 98%   BMI 36.76 kg/m²    O2 Flow Rate (L/min): 2 l/min O2 Device: Nasal cannula    No data recorded. No intake/output data recorded. 12/25 1901 - 12/27 0700  In: 303.3   Out: -     General:   Awake and alert   Lungs:    Crackles bilateral.   Chest wall:  No tenderness or deformity. Heart:  Regular rate and rhythm, S1, S2 normal, no murmur, click, rub or gallop. Abdomen:   Soft, non-tender. Bowel sounds normal. No masses,  No organomegaly. Extremities: Extremities normal, atraumatic, no cyanosis 3+ pitting edema. Pulses: 2+ and symmetric all extremities. Skin: Skin color, texture, turgor normal. No rashes or lesions   Neurologic: CNII-XII intact. No gross focal deficits         Data Review:       Recent Days:  Recent Labs     12/27/21  0555 12/26/21  0432 12/25/21  2231   WBC 12.1* 9.9 11.7*   HGB 8.0* 6.6* 7.1*   HCT 24.8* 20.9* 21.9*    274 291     Recent Labs     12/27/21  0555 12/26/21  0432 12/25/21  2231    139 137   K 3.2* 3.2* 2.9*   * 108 100   CO2 19* 21 21   * 137* 150*   BUN 61* 61* 62*   CREA 5.67* 5.60* 5.53*   CA 9.9 9.5 9.6   MG 1.9 2.0  --    PHOS 3.8  3.7  --   --    ALB 3.1*  --  3.6   TBILI  --   --  0.2   ALT  --   --  28     No results for input(s): PH, PCO2, PO2, HCO3, FIO2 in the last 72 hours.     24 Hour Results:  Recent Results (from the past 24 hour(s))   GLUCOSE, POC    Collection Time: 12/26/21  5:17 PM   Result Value Ref Range    Glucose (POC) 136 (H) 65 - 117 mg/dL    Performed by Rafael Cruz POC    Collection Time: 12/26/21 10:18 PM   Result Value Ref Range    Glucose (POC) 150 (H) 65 - 117 mg/dL    Performed by Shahla Mills TROPONIN-HIGH SENSITIVITY    Collection Time: 12/26/21 11:24 PM   Result Value Ref Range    Troponin-High Sensitivity 1,191 (HH) 0 - 51 ng/L   URINALYSIS W/MICROSCOPIC    Collection Time: 12/26/21 11:24 PM   Result Value Ref Range    Color Yellow/Straw      Appearance Clear Clear      Specific gravity 1.011 1.003 - 1.030      pH (UA) 5.0 5.0 - 8.0      Protein 100 (A) Negative mg/dL    Glucose Negative Negative mg/dL    Ketone Negative Negative mg/dL    Bilirubin Negative Negative      Blood Large (A) Negative      Urobilinogen 0.1 0.1 - 1.0 EU/dL    Nitrites Negative Negative      Leukocyte Esterase Trace (A) Negative      WBC 0-4 0 - 4 /hpf    RBC 0-5 0 - 5 /hpf    Bacteria Negative Negative /hpf   PROTEIN/CREATININE RATIO, URINE    Collection Time: 12/26/21 11:24 PM   Result Value Ref Range    Protein, urine random 154 (H) 0.0 - 11.9 mg/dL    Creatinine, urine 91.00 mg/dL    Protein/Creat. urine Ratio 1.7     PTT    Collection Time: 12/26/21 11:38 PM   Result Value Ref Range    aPTT 35.6 (H) 21.2 - 34.1 sec    aPTT, therapeutic range   82 - 109 sec   CBC WITH AUTOMATED DIFF    Collection Time: 12/27/21  5:55 AM   Result Value Ref Range    WBC 12.1 (H) 3.6 - 11.0 K/uL    RBC 3.17 (L) 3.80 - 5.20 M/uL    HGB 8.0 (L) 11.5 - 16.0 g/dL    HCT 24.8 (L) 35.0 - 47.0 %    MCV 78.2 (L) 80.0 - 99.0 FL    MCH 25.2 (L) 26.0 - 34.0 PG    MCHC 32.3 30.0 - 36.5 g/dL    RDW 17.5 (H) 11.5 - 14.5 %    PLATELET 193 756 - 610 K/uL    MPV 11.2 8.9 - 12.9 FL    NRBC 0.0 0.0  WBC    ABSOLUTE NRBC 0.00 0.00 - 0.01 K/uL    NEUTROPHILS 82 (H) 32 - 75 %    LYMPHOCYTES 10 (L) 12 - 49 %    MONOCYTES 7 5 - 13 %    EOSINOPHILS 0 0 - 7 %    BASOPHILS 0 0 - 1 %    IMMATURE GRANULOCYTES 1 (H) 0 - 0.5 %    ABS. NEUTROPHILS 9.9 (H) 1.8 - 8.0 K/UL    ABS. LYMPHOCYTES 1.1 0.8 - 3.5 K/UL    ABS. MONOCYTES 0.9 0.0 - 1.0 K/UL    ABS. EOSINOPHILS 0.0 0.0 - 0.4 K/UL    ABS. BASOPHILS 0.0 0.0 - 0.1 K/UL    ABS. IMM.  GRANS. 0.1 (H) 0.00 - 0.04 K/UL DF AUTOMATED     PHOSPHORUS    Collection Time: 12/27/21  5:55 AM   Result Value Ref Range    Phosphorus 3.7 2.6 - 4.7 mg/dL   RENAL FUNCTION PANEL    Collection Time: 12/27/21  5:55 AM   Result Value Ref Range    Sodium 140 136 - 145 mmol/L    Potassium 3.2 (L) 3.5 - 5.1 mmol/L    Chloride 109 (H) 97 - 108 mmol/L    CO2 19 (L) 21 - 32 mmol/L    Anion gap 12 5 - 15 mmol/L    Glucose 138 (H) 65 - 100 mg/dL    BUN 61 (H) 6 - 20 mg/dL    Creatinine 5.67 (H) 0.55 - 1.02 mg/dL    BUN/Creatinine ratio 11 (L) 12 - 20      GFR est AA 9 (L) >60 ml/min/1.73m2    GFR est non-AA 7 (L) >60 ml/min/1.73m2    Calcium 9.9 8.5 - 10.1 mg/dL    Phosphorus 3.8 2.6 - 4.7 mg/dL    Albumin 3.1 (L) 3.5 - 5.0 g/dL   MAGNESIUM    Collection Time: 12/27/21  5:55 AM   Result Value Ref Range    Magnesium 1.9 1.6 - 2.4 mg/dL   PTT    Collection Time: 12/27/21  6:00 AM   Result Value Ref Range    aPTT 47.2 (H) 21.2 - 34.1 sec    aPTT, therapeutic range   82 - 109 sec   GLUCOSE, POC    Collection Time: 12/27/21  9:29 AM   Result Value Ref Range    Glucose (POC) 132 (H) 65 - 117 mg/dL    Performed by Manuel Medel, POC    Collection Time: 12/27/21 12:55 PM   Result Value Ref Range    Glucose (POC) 127 (H) 65 - 117 mg/dL    Performed by MARIANNE Chapman orders to display        Assessment:  Acute non-STEMI    Acute fluid overload, likely due to kidney disease    Chronic kidney disease stage V    Unilateral kidney with history of nephrectomy for renal cell carcinoma    History of renal artery stenosis of solitary right kidney    Anemia of chronic kidney disease    Coronary artery disease with history of CABG and PCI    Diabetes mellitus type 2    Hypertension    Hyperlipidemia    Peripheral vascular disease with history of left SFA stenosis        Plan:  (1) Patient had decided , with son at bed side they wont opt for HD    (2) Heparin gtt for another 24 hours    (3) on aspirin, plavix, hydralazine, imdur, ranexa, metoprolol    (4) Cardiology reaching out to her primary cardiologist          Care Plan discussed with: Patient/Family, son at the bedside    Disposition: Continued inpatient care    Total time spent with patient: 30 minutes.     David Gamez MD

## 2021-12-27 NOTE — ED NOTES
Bedside and Verbal shift change report given to Ana Maria Goodwin RN (oncoming nurse) by Yuliana Mckeon RN(offgoing nurse). Report included the following information SBAR, ED Summary and MAR.

## 2021-12-28 LAB
25(OH)D3 SERPL-MCNC: 50.1 NG/ML (ref 30–100)
25(OH)D3 SERPL-MCNC: 72.2 NG/ML (ref 30–100)
ALBUMIN SERPL-MCNC: 3.1 G/DL (ref 3.5–5)
ANION GAP SERPL CALC-SCNC: 10 MMOL/L (ref 5–15)
ANION GAP SERPL CALC-SCNC: 11 MMOL/L (ref 5–15)
APTT PPP: 80.1 SEC (ref 21.2–34.1)
BASOPHILS # BLD: 0 K/UL (ref 0–0.1)
BASOPHILS NFR BLD: 0 % (ref 0–1)
BUN SERPL-MCNC: 64 MG/DL (ref 6–20)
BUN SERPL-MCNC: 65 MG/DL (ref 6–20)
BUN/CREAT SERPL: 11 (ref 12–20)
BUN/CREAT SERPL: 11 (ref 12–20)
CA-I BLD-MCNC: 10 MG/DL (ref 8.5–10.1)
CA-I BLD-MCNC: 10.4 MG/DL (ref 8.5–10.1)
CA-I BLD-MCNC: 9.4 MG/DL (ref 8.5–10.1)
CHLORIDE SERPL-SCNC: 105 MMOL/L (ref 97–108)
CHLORIDE SERPL-SCNC: 106 MMOL/L (ref 97–108)
CO2 SERPL-SCNC: 21 MMOL/L (ref 21–32)
CO2 SERPL-SCNC: 21 MMOL/L (ref 21–32)
CREAT SERPL-MCNC: 5.71 MG/DL (ref 0.55–1.02)
CREAT SERPL-MCNC: 5.73 MG/DL (ref 0.55–1.02)
DIFFERENTIAL METHOD BLD: ABNORMAL
EOSINOPHIL # BLD: 0.1 K/UL (ref 0–0.4)
EOSINOPHIL NFR BLD: 1 % (ref 0–7)
ERYTHROCYTE [DISTWIDTH] IN BLOOD BY AUTOMATED COUNT: 17.4 % (ref 11.5–14.5)
FERRITIN SERPL-MCNC: 24 NG/ML (ref 26–388)
GLUCOSE BLD STRIP.AUTO-MCNC: 113 MG/DL (ref 65–117)
GLUCOSE BLD STRIP.AUTO-MCNC: 135 MG/DL (ref 65–117)
GLUCOSE BLD STRIP.AUTO-MCNC: 143 MG/DL (ref 65–117)
GLUCOSE BLD STRIP.AUTO-MCNC: 144 MG/DL (ref 65–117)
GLUCOSE SERPL-MCNC: 102 MG/DL (ref 65–100)
GLUCOSE SERPL-MCNC: 98 MG/DL (ref 65–100)
HCT VFR BLD AUTO: 24.8 % (ref 35–47)
HGB BLD-MCNC: 8.2 G/DL (ref 11.5–16)
IMM GRANULOCYTES # BLD AUTO: 0.1 K/UL (ref 0–0.04)
IMM GRANULOCYTES NFR BLD AUTO: 1 % (ref 0–0.5)
IRON SATN MFR SERPL: 6 % (ref 20–50)
IRON SERPL-MCNC: 21 UG/DL (ref 35–150)
LYMPHOCYTES # BLD: 1.2 K/UL (ref 0.8–3.5)
LYMPHOCYTES NFR BLD: 10 % (ref 12–49)
MCH RBC QN AUTO: 25.8 PG (ref 26–34)
MCHC RBC AUTO-ENTMCNC: 33.1 G/DL (ref 30–36.5)
MCV RBC AUTO: 78 FL (ref 80–99)
MONOCYTES # BLD: 0.8 K/UL (ref 0–1)
MONOCYTES NFR BLD: 7 % (ref 5–13)
NEUTS SEG # BLD: 10.1 K/UL (ref 1.8–8)
NEUTS SEG NFR BLD: 81 % (ref 32–75)
NRBC # BLD: 0 K/UL (ref 0–0.01)
NRBC BLD-RTO: 0 PER 100 WBC
PERFORMED BY, TECHID: ABNORMAL
PERFORMED BY, TECHID: NORMAL
PHOSPHATE SERPL-MCNC: 3.5 MG/DL (ref 2.6–4.7)
PLATELET # BLD AUTO: 258 K/UL (ref 150–400)
PMV BLD AUTO: 10.9 FL (ref 8.9–12.9)
POTASSIUM SERPL-SCNC: 3.2 MMOL/L (ref 3.5–5.1)
POTASSIUM SERPL-SCNC: 3.3 MMOL/L (ref 3.5–5.1)
PTH-INTACT SERPL-MCNC: 236.4 PG/ML (ref 18.4–88)
RBC # BLD AUTO: 3.18 M/UL (ref 3.8–5.2)
SODIUM SERPL-SCNC: 136 MMOL/L (ref 136–145)
SODIUM SERPL-SCNC: 138 MMOL/L (ref 136–145)
THERAPEUTIC RANGE,PTTT: ABNORMAL SEC (ref 82–109)
TIBC SERPL-MCNC: 350 UG/DL (ref 250–450)
WBC # BLD AUTO: 12.3 K/UL (ref 3.6–11)

## 2021-12-28 PROCEDURE — 82728 ASSAY OF FERRITIN: CPT

## 2021-12-28 PROCEDURE — 83540 ASSAY OF IRON: CPT

## 2021-12-28 PROCEDURE — 80048 BASIC METABOLIC PNL TOTAL CA: CPT

## 2021-12-28 PROCEDURE — 65270000029 HC RM PRIVATE

## 2021-12-28 PROCEDURE — 85730 THROMBOPLASTIN TIME PARTIAL: CPT

## 2021-12-28 PROCEDURE — 74011250636 HC RX REV CODE- 250/636: Performed by: INTERNAL MEDICINE

## 2021-12-28 PROCEDURE — 80069 RENAL FUNCTION PANEL: CPT

## 2021-12-28 PROCEDURE — 82306 VITAMIN D 25 HYDROXY: CPT

## 2021-12-28 PROCEDURE — 74011250637 HC RX REV CODE- 250/637: Performed by: INTERNAL MEDICINE

## 2021-12-28 PROCEDURE — 74011636637 HC RX REV CODE- 636/637: Performed by: HOSPITALIST

## 2021-12-28 PROCEDURE — 94760 N-INVAS EAR/PLS OXIMETRY 1: CPT

## 2021-12-28 PROCEDURE — 36415 COLL VENOUS BLD VENIPUNCTURE: CPT

## 2021-12-28 PROCEDURE — 82962 GLUCOSE BLOOD TEST: CPT

## 2021-12-28 PROCEDURE — 83970 ASSAY OF PARATHORMONE: CPT

## 2021-12-28 PROCEDURE — 77010033678 HC OXYGEN DAILY

## 2021-12-28 PROCEDURE — 85025 COMPLETE CBC W/AUTO DIFF WBC: CPT

## 2021-12-28 PROCEDURE — 74011250637 HC RX REV CODE- 250/637: Performed by: HOSPITALIST

## 2021-12-28 RX ORDER — POTASSIUM CHLORIDE 1.5 G/1.77G
40 POWDER, FOR SOLUTION ORAL
Status: COMPLETED | OUTPATIENT
Start: 2021-12-28 | End: 2021-12-28

## 2021-12-28 RX ADMIN — HYDRALAZINE HYDROCHLORIDE 50 MG: 50 TABLET, FILM COATED ORAL at 09:10

## 2021-12-28 RX ADMIN — SODIUM CHLORIDE, PRESERVATIVE FREE 10 ML: 5 INJECTION INTRAVENOUS at 21:59

## 2021-12-28 RX ADMIN — FUROSEMIDE 80 MG: 10 INJECTION, SOLUTION INTRAMUSCULAR; INTRAVENOUS at 21:58

## 2021-12-28 RX ADMIN — SODIUM CHLORIDE, PRESERVATIVE FREE 10 ML: 5 INJECTION INTRAVENOUS at 13:21

## 2021-12-28 RX ADMIN — POTASSIUM CHLORIDE 40 MEQ: 1.5 FOR SOLUTION ORAL at 11:53

## 2021-12-28 RX ADMIN — Medication 1000 UNITS: at 09:11

## 2021-12-28 RX ADMIN — INSULIN LISPRO 2 UNITS: 100 INJECTION, SOLUTION INTRAVENOUS; SUBCUTANEOUS at 11:49

## 2021-12-28 RX ADMIN — POTASSIUM CHLORIDE 40 MEQ: 750 TABLET, FILM COATED, EXTENDED RELEASE ORAL at 09:10

## 2021-12-28 RX ADMIN — INSULIN GLARGINE 25 UNITS: 100 INJECTION, SOLUTION SUBCUTANEOUS at 21:58

## 2021-12-28 RX ADMIN — METOLAZONE 5 MG: 5 TABLET ORAL at 21:58

## 2021-12-28 RX ADMIN — AMLODIPINE BESYLATE 10 MG: 5 TABLET ORAL at 09:11

## 2021-12-28 RX ADMIN — SODIUM CHLORIDE, PRESERVATIVE FREE 10 ML: 5 INJECTION INTRAVENOUS at 05:30

## 2021-12-28 RX ADMIN — ISOSORBIDE MONONITRATE 60 MG: 60 TABLET, EXTENDED RELEASE ORAL at 09:11

## 2021-12-28 RX ADMIN — METOPROLOL SUCCINATE 50 MG: 50 TABLET, EXTENDED RELEASE ORAL at 09:10

## 2021-12-28 RX ADMIN — HYDRALAZINE HYDROCHLORIDE 50 MG: 50 TABLET, FILM COATED ORAL at 21:58

## 2021-12-28 RX ADMIN — ATORVASTATIN CALCIUM 40 MG: 40 TABLET, FILM COATED ORAL at 21:58

## 2021-12-28 RX ADMIN — RANOLAZINE 500 MG: 500 TABLET, FILM COATED, EXTENDED RELEASE ORAL at 21:58

## 2021-12-28 RX ADMIN — METOLAZONE 5 MG: 5 TABLET ORAL at 09:11

## 2021-12-28 RX ADMIN — INSULIN LISPRO 2 UNITS: 100 INJECTION, SOLUTION INTRAVENOUS; SUBCUTANEOUS at 17:14

## 2021-12-28 RX ADMIN — RANOLAZINE 500 MG: 500 TABLET, FILM COATED, EXTENDED RELEASE ORAL at 09:11

## 2021-12-28 RX ADMIN — CLOPIDOGREL BISULFATE 75 MG: 75 TABLET ORAL at 09:00

## 2021-12-28 RX ADMIN — ASPIRIN 81 MG CHEWABLE TABLET 81 MG: 81 TABLET CHEWABLE at 09:11

## 2021-12-28 RX ADMIN — FUROSEMIDE 80 MG: 10 INJECTION, SOLUTION INTRAMUSCULAR; INTRAVENOUS at 09:12

## 2021-12-28 RX ADMIN — HYDRALAZINE HYDROCHLORIDE 50 MG: 50 TABLET, FILM COATED ORAL at 17:08

## 2021-12-28 NOTE — PROGRESS NOTES
CM in to see patient at bedside. CM offered to arrange [facility] at discharge. Patient politely declined [facility] at this time.

## 2021-12-28 NOTE — PROGRESS NOTES
Patient: Aurelia Cole MRN: [id number]     YOB: 1950  Age: 66 y.o. Sex: female      Consult requested by: Paras Belcher MD    Subjective: The patient is seen in the room.   She feels little better today  On IV Lasix 80 mg twice a day plus added metolazone  Creatinine is 5.7    Past Medical History:  Past Medical History:   Diagnosis Date    CAD (coronary artery disease)     Diabetes ([facility]     Heart failure ([facility]     Hypertension        Past Surgical History:  Past Surgical History:   Procedure Laterality Date    SC CARDIAC SURG PROCEDURE UNLIST         Family History:  Family History   Problem Relation Age of Onset    Hypertension Father        Social History:  Social History     Socioeconomic History    Marital status:      Spouse name: Not on file    Number of children: Not on file    Years of education: Not on file    Highest education level: Not on file   Occupational History    Not on file   Tobacco Use    Smoking status: Former Smoker    Smokeless tobacco: Former User     Quit date: 1/1/2018   Vaping Use    Vaping Use: Never used   Substance and Sexual Activity    Alcohol use: Not Currently    Drug use: Never    Sexual activity: Not on file   Other Topics Concern     Service Not Asked    Blood Transfusions Not Asked    Caffeine Concern Not Asked    Occupational Exposure Not Asked    Hobby Hazards Not Asked    Sleep Concern Not Asked    Stress Concern Not Asked    Weight Concern Not Asked    Special Diet Not Asked    Back Care Not Asked    Exercise Not Asked    Bike Helmet Not Asked   [address] [facility] Not Asked    Self-Exams Not Asked   Social History Narrative    Not on file     Social Determinants of Health     Financial Resource Strain:     Difficulty of Paying Living Expenses: Not on file   Food Insecurity:     Worried About Running Out of Food in the Last Year: Not on file    Ran Out of Food in the Last Year: Not on file   Transportation Needs:     Lack of Transportation (Medical): Not on file    Lack of Transportation (Non-Medical): Not on file   Physical Activity:     Days of Exercise per Week: Not on file    Minutes of Exercise per Session: Not on file   Stress:     Feeling of Stress : Not on file   Social Connections:     Frequency of Communication with Friends and Family: Not on file    Frequency of Social Gatherings with Friends and Family: Not on file    Attends Mandaeism Services: Not on file    Active Member of [address] [place] [address] [employer] or Organizations: Not on file    Attends Club or Organization Meetings: Not on file    Marital Status: Not on file   Intimate Partner Violence:     Fear of Current or Ex-Partner: Not on file    Emotionally Abused: Not on file    Physically Abused: Not on file    Sexually Abused: Not on file   Housing Stability:     Unable to Pay for Housing in the Last Year: Not on file    Number of [place] in the Last Year: Not on file    Unstable Housing in the Last Year: Not on file       Allergies   Allergen Reactions    Penicillin G Hives       Review of Systems:  No fever or chills. No sore throat. No cough or hemoptysis. + shortness of breath &  chest pain. +  orthopnea     No nausea, vomiting, abdominal pain, melena or hematochezia.    + B/L LE swelling, no joint paints. No muscle aches. No skin changes. No dizziness or lightheadedness. No headaches. Objective:     Vitals:    12/28/21 0756 12/28/21 0800 12/28/21 0841 12/28/21 0843   BP: (!) 147/70      Pulse: 85 80     Resp: 18      Temp: 98.1 °F (36.7 °C)      SpO2: 100%  100% 100%   Weight:       Height:            Intake and Output:  Current Shift: No intake/output data recorded. Last three shifts: No intake/output data recorded.     Physical Exam:   GENERAL: alert, cooperative, no distress, appears stated age  EYE: negative  THROAT & NECK: normal, no erythema or exudates noted. , and JVD  LUNG: clear to auscultation bilaterally, RT LE lower 1/3 fine rales,  Left side clear   HEART: regular rate and rhythm, S1, S2 normal, no murmur, click, rub or gallop  ABDOMEN: soft, non-tender. Bowel sounds normal. No masses,  no organomegaly   - No fallon  EXTREMITIES:  2+ pitting edema  SKIN: Normal. and no rash or abnormalities  NEUROLOGIC: negative , no gross neuro deficits. PSYCHIATRIC: non focal    Data Review    Recent Results (from the past 24 hour(s))   GLUCOSE, POC    Collection Time: 12/27/21 12:55 PM   Result Value Ref Range    Glucose (POC) 127 (H) 65 - 117 mg/dL    Performed by MARIANNE BOO    PTT    Collection Time: 12/27/21  4:05 PM   Result Value Ref Range    aPTT 74.4 (H) 21.2 - 34.1 sec    aPTT, therapeutic range   82 - 109 sec   GLUCOSE, POC    Collection Time: 12/27/21  8:53 PM   Result Value Ref Range    Glucose (POC) 122 (H) 65 - 117 mg/dL    Performed by Ciro Beverly (PCT)    GLUCOSE, POC    Collection Time: 12/28/21  7:21 AM   Result Value Ref Range    Glucose (POC) 113 65 - 117 mg/dL    Performed by Martin Rodriguez    CBC WITH AUTOMATED DIFF    Collection Time: 12/28/21  7:54 AM   Result Value Ref Range    WBC 12.3 (H) 3.6 - 11.0 K/uL    RBC 3.18 (L) 3.80 - 5.20 M/uL    HGB 8.2 (L) 11.5 - 16.0 g/dL    HCT 24.8 (L) 35.0 - 47.0 %    MCV 78.0 (L) 80.0 - 99.0 FL    MCH 25.8 (L) 26.0 - 34.0 PG    MCHC 33.1 30.0 - 36.5 g/dL    RDW 17.4 (H) 11.5 - 14.5 %    PLATELET 147 454 - 519 K/uL    MPV 10.9 8.9 - 12.9 FL    NRBC 0.0 0.0  WBC    ABSOLUTE NRBC 0.00 0.00 - 0.01 K/uL    NEUTROPHILS 81 (H) 32 - 75 %    LYMPHOCYTES 10 (L) 12 - 49 %    MONOCYTES 7 5 - 13 %    EOSINOPHILS 1 0 - 7 %    BASOPHILS 0 0 - 1 %    IMMATURE GRANULOCYTES 1 (H) 0 - 0.5 %    ABS. NEUTROPHILS 10.1 (H) 1.8 - 8.0 K/UL    ABS. LYMPHOCYTES 1.2 0.8 - 3.5 K/UL    ABS. MONOCYTES 0.8 0.0 - 1.0 K/UL    ABS. EOSINOPHILS 0.1 0.0 - 0.4 K/UL    ABS. BASOPHILS 0.0 0.0 - 0.1 K/UL    ABS. IMM.  GRANS. 0.1 (H) 0.00 - 0.04 K/UL    DF AUTOMATED     RENAL FUNCTION PANEL    Collection Time: 12/28/21  7:54 AM   Result Value Ref Range    Sodium 138 136 - 145 mmol/L    Potassium 3.2 (L) 3.5 - 5.1 mmol/L    Chloride 106 97 - 108 mmol/L    CO2 21 21 - 32 mmol/L    Anion gap 11 5 - 15 mmol/L    Glucose 102 (H) 65 - 100 mg/dL    BUN 64 (H) 6 - 20 mg/dL    Creatinine 5.71 (H) 0.55 - 1.02 mg/dL    BUN/Creatinine ratio 11 (L) 12 - 20      GFR est AA 9 (L) >60 ml/min/1.73m2    GFR est non-AA 7 (L) >60 ml/min/1.73m2    Calcium 10.4 (H) 8.5 - 10.1 mg/dL    Phosphorus 3.5 2.6 - 4.7 mg/dL    Albumin 3.1 (L) 3.5 - 5.0 g/dL   PTT    Collection Time: 12/28/21  7:54 AM   Result Value Ref Range    aPTT 80.1 (H) 21.2 - 34.1 sec    aPTT, therapeutic range   82 - 109 sec          Imaging -     1. Cxray - PROCEDURE: XR CHEST PORT     HISTORY:Short of breath     COMPARISON:None        TECHNIQUE: AP portable chest     LIMITATIONS: None     TUBES/LINES: None     LUNG PARENCHYMA/PLEURA: Calcified nodule in the right lung base. Mildly  increased interstitial markings bilaterally. No pleural effusion although there  is some subpleural edema  TRACHEA/BRONCHI: Normal  PULMONARY VESSELS: Mild cephalization of the vasculature  HEART: Mild cardiomegaly. There is evidence of previous cardiac surgery and  stent placement. MEDIASTINUM: Normal  BONE/SOFT TISSUES: No acute process     OTHER: None     IMPRESSION  Mild cardiomegaly with mild changes of cardiac decompensation in the  lungs. No florid CHF. .        EKG - Normal sinus rhythm   Marked ST abnormality, possible lateral subendocardial injury   Abnormal ECG   When compared with ECG of 25-DEC-2021 22:51,   PREVIOUS ECG IS PRESENT   Confirmed by PACO MACARIO, Garret Hummel ([id number]) on 12/26/2021 12:16:42 PM     Assessment & Plan:     Hospital Problems  Date Reviewed: 12/26/2021          Codes Class Noted POA    * (Principal) NSTEMI (non-ST elevated myocardial infarction) ([facility] [address] ICD-10-CM: I21.4  ICD-9-CM: 410.70 12/26/2021 Yes        HTN (hypertension) ICD-10-CM: I10  ICD-9-CM: 401.9  12/26/2021 Yes        HLD (hyperlipidemia) ICD-10-CM: E78.5  ICD-9-CM: 272.4  12/26/2021 Yes        Edema ICD-10-CM: R60.9  ICD-9-CM: 782.3  12/26/2021 Yes        Type 2 diabetes mellitus, with long-term current use of insulin (HCC) ICD-10-CM: E11.9, Z79.4  ICD-9-CM: 250.00, V58.67  12/26/2021 Yes        CKD (chronic kidney disease) stage 5, GFR less than 15 ml/min (HCC) ICD-10-CM: N18.5  ICD-9-CM: 585.5  12/26/2021 Yes        Anemia ICD-10-CM: D64.9  ICD-9-CM: 285.9  12/26/2021 Yes        Acquired absence of kidney ICD-10-CM: Z90.5  ICD-9-CM: V45.73  12/26/2021 Unknown            1- JIM on possibly advanced CKD stage unknown:   -Since admission Cr 5.7 with eGFR 9 only  -Unknown baseline.   -she probably had advanced CKD due to which her nephrologist KEN BLANCO [facility] had discussed about starting dialysis with her.  -Renal US showed No right-sided hydronephrosis. s/p left nephrectomy.  -Clinically volume overload, no uremia  -Currently no emergent indications for dialysis. -I will consider starting her on dialysis in the setting of poor diuretic response or worsening renal functions. 2-Hypokalemia:  -from loops  -po KCL 40 meq x 2 doses today  -cont KCL 40 meq daily      3-Anemia:  -likely due to CKD. -Received 1 unit of PRBC in the ED. -pending iron studies  -started on Epo sq MWF.    4-NSTEMI   -started on heparin drip.  -Echo LVEF 30-35%  -Cardio possibly planning for CC    5-Hypertension:  -blood pressure currently better controlled. -will keep amlodipine 10 mg/Imdur 60 mg/metoprolol 50 mg twice a day and hydralazine 50 mg 3 times daily. 6-CHF:  -Decompensated and fluid overload due to #1  -LVEF 30 to 35%  -On IV Lasix 80 mg twice a day  -Still complaining of orthopnea  -added metolazone 5 mg twice a day  -Plan to consider dialysis in the setting of poor diuretic response.     7-Renal osteodystrophy:  -Ca 10.4, Phos 3.5  -pending iPTH and Vit D     Signed By: Aria Bey MD     December 28, 2021

## 2021-12-28 NOTE — PROGRESS NOTES
Progress Note    Patient: Aurelia Cole MRN: [id number]  SSN: [id number]    YOB: 1950  Age: 66 y.o. Sex: female      Admit Date: 12/26/2021    LOS: 2 days     Subjective:     She is laying flat this morning. Anyhow she gets short winded when she gets up and walk around. Denied having any chest pain    Objective:     Vitals:    12/27/21 2316 12/28/21 0000 12/28/21 0335 12/28/21 0400   BP: (!) 149/68  (!) 146/63    Pulse: 70 70 91 91   Resp: 20  22    Temp: 98.7 °F (37.1 °C)  98 °F (36.7 °C)    SpO2: 98%  98%    Weight:       Height:            Intake and Output:  Current Shift: No intake/output data recorded. Last three shifts: No intake/output data recorded. Physical Exam:   General:  Alert, cooperative, no distress, appears stated age. Eyes:  Conjunctivae/corneas clear. PERRL, EOMs intact. Fundi benign   Ears:  Normal TMs and external ear canals both ears. Nose: Nares normal. Septum midline. Mucosa normal. No drainage or sinus tenderness. Mouth/Throat: Lips, mucosa, and tongue normal. Teeth and gums normal.   Neck: Supple, symmetrical, trachea midline, no adenopathy, thyroid: no enlargment/tenderness/nodules, no carotid bruit and no JVD. Back:   Symmetric, no curvature. ROM normal. No CVA tenderness. Lungs:   Clear to auscultation bilaterally. Heart:  Regular rate and rhythm, S1, S2 normal, no murmur, click, rub or gallop. Abdomen:   Soft, non-tender. Bowel sounds normal. No masses,  No organomegaly. Extremities: Extremities normal, atraumatic, no cyanosis or edema. Pulses: 2+ and symmetric all extremities. Skin: Skin color, texture, turgor normal. No rashes or lesions   Lymph nodes: Cervical, supraclavicular, and axillary nodes normal.   Neurologic: CNII-XII intact. Normal strength, sensation and reflexes throughout. Lab/Data Review: All lab results for the last 24 hours reviewed.          Assessment:     Principal Problem:    NSTEMI (non-ST elevated myocardial infarction) ([facility] (12/26/2021)    Active Problems:    HTN (hypertension) (12/26/2021)      HLD (hyperlipidemia) (12/26/2021)      Edema (12/26/2021)      Type 2 diabetes mellitus, with long-term current use of insulin ([facility] (12/26/2021)      CKD (chronic kidney disease) stage 5, GFR less than 15 ml/min (HCC) (12/26/2021)      Anemia (12/26/2021)      Acquired absence of kidney (12/26/2021)         Patient is a 26-year-old -American female with:  1. Non-STEMI  2. Heart failure with decompensation  3. Anemia  4. Status post nephrectomy for renal cell carcinoma  5. Peripheral vascular disease status post renal artery stenting  6. Diabetes mellitus  7. Hyperlipidemia  8. Hypertension  9. Hypokalemia  Plan:     There is no accurate ins and outs. Appreciate nephrology input. She is resistant to the dialysis. Again patient with volume overload and cardiomyopathy. Hemoglobin 8.0. Labs are pending this morning. Stop heparin. Continue amlodipine, aspirin, atorvastatin, Plavix, IV Lasix, metolazone and isosorbide mononitrate. Ranolazine.     Signed By: Isela Wren MD     December 28, 2021

## 2021-12-28 NOTE — ROUTINE PROCESS
Dual skin assessment revealed no skin issues or wounds. Patient did have some +3 pitting edema noted to bilateral hands and feet. Skin is clean, dry, and intact.

## 2021-12-28 NOTE — PROGRESS NOTES
Problem: Falls - Risk of  Goal: *Absence of Falls  Description: Document Breonna Don Fall Risk and appropriate interventions in the flowsheet.   Outcome: Progressing Towards Goal  Note: Fall Risk Interventions:  Mobility Interventions: Patient to call before getting OOB,PT Consult for mobility concerns,Utilize walker, cane, or other assistive device         Medication Interventions: Patient to call before getting OOB,Teach patient to arise slowly                   Problem: Patient Education: Go to Patient Education Activity  Goal: Patient/Family Education  Outcome: Progressing Towards Goal

## 2021-12-28 NOTE — PROGRESS NOTES
Hospitalist Progress Note    Subjective:   Daily Progress Note: 12/28/2021 8:15 AM    Hospital Course: Patient is a 27-year-old female from [place] that has a history of coronary artery disease with CABG in 2001 and PCI as well as non-STEMI, stage V chronic kidney disease with a unilateral kidney, diabetes, history of nephrectomy for renal cell carcinoma who came to the ED in [facility] with complaints of shortness of breath and left-sided chest pain, and lower extremity edema. In the ED she was hypertensive with otherwise stable vital signs. Labs showed a hemoglobin of 7.1, potassium 2.9, creatinine 5.5 which is her baseline. Initial troponin was 876 with a repeat of 1068. She was transfused 1 unit of blood. Due to concerns of non-STEMI started on a heparin drip as well. Cardiology and nephrology consulted. Per cardiology continue to diurese and offered a cardiac catheterization however very high likelihood that she will need dialysis afterward. Patient declines dialysis therefore will treat medically. 2D echo showed an EF of 30 to 35% with apical dyskinesis and inferior and inferior lateral hypokinesis. Subjective: Patient says that she gets short of breath when lying flat.  Denies any chest pain    Current Facility-Administered Medications   Medication Dose Route Frequency    metOLazone (ZAROXOLYN) tablet 5 mg  5 mg Oral BID    hydrALAZINE (APRESOLINE) tablet 50 mg  50 mg Oral TID    [START ON 12/29/2021] epoetin ko-epbx (RETACRIT) injection 10,000 Units  10,000 Units SubCUTAneous Q MON, WED & FRI    glucose chewable tablet 16 g  4 Tablet Oral PRN    dextrose (D50W) injection syrg 12.5-25 g  25-50 mL IntraVENous PRN    glucagon (GLUCAGEN) injection 1 mg  1 mg IntraMUSCular PRN    sodium chloride (NS) flush 5-40 mL  5-40 mL IntraVENous Q8H    sodium chloride (NS) flush 5-40 mL  5-40 mL IntraVENous PRN    insulin glargine (LANTUS) injection 25 Units  25 Units SubCUTAneous QHS    insulin lispro (HUMALOG) injection   SubCUTAneous AC&HS    heparin 25,000 units in D5W 250 ml infusion  12-25 Units/kg/hr (Adjusted) IntraVENous TITRATE    acetaminophen (TYLENOL) tablet 650 mg  650 mg Oral Q4H PRN    heparin (porcine) 1,000 unit/mL injection 4,000 Units  4,000 Units IntraVENous PRN    Or    heparin (porcine) 1,000 unit/mL injection 2,000 Units  2,000 Units IntraVENous PRN    0.9% sodium chloride infusion 250 mL  250 mL IntraVENous PRN    potassium chloride SR (KLOR-CON 10) tablet 40 mEq  40 mEq Oral DAILY    aspirin chewable tablet 81 mg  81 mg Oral DAILY    cetirizine (ZYRTEC) tablet 10 mg  10 mg Oral DAILY    clopidogreL (PLAVIX) tablet 75 mg  75 mg Oral DAILY    metoprolol succinate (TOPROL-XL) XL tablet 50 mg  50 mg Oral DAILY    nitroglycerin (NITROSTAT) tablet 0.4 mg  0.4 mg SubLINGual Q5MIN PRN    ranolazine ER (RANEXA) tablet 500 mg  500 mg Oral BID    atorvastatin (LIPITOR) tablet 40 mg  40 mg Oral QHS    amLODIPine (NORVASC) tablet 10 mg  10 mg Oral DAILY    cholecalciferol (VITAMIN D3) (1000 Units /25 mcg) tablet 1,000 Units  1,000 Units Oral DAILY    furosemide (LASIX) injection 80 mg  80 mg IntraVENous BID    isosorbide mononitrate ER (IMDUR) tablet 60 mg  60 mg Oral DAILY    ondansetron (ZOFRAN) injection 4 mg  4 mg IntraVENous Q6H PRN        Review of Systems  Constitutional: No fevers, No chills, No sweats, +fatigue, + Weakness  Eyes: No redness  Ears, nose, mouth, throat, and face: No nasal congestion, No sore throat, No voice change  Respiratory: + Shortness of Breath, No cough, No wheezing  Cardiovascular: No chest pain, No palpitations, No extremity edema  Gastrointestinal: No nausea, No vomiting, No diarrhea, No abdominal pain  Genitourinary: No frequency, No dysuria, No hematuria  Integument/breast: No skin lesion(s)   Neurological: No Confusion, No headaches, No dizziness      Objective:     Visit Vitals  BP (!) 147/70 (BP Patient Position: At rest;Sitting)   Pulse 85 Temp 98.1 °F (36.7 °C)   Resp 18   Ht 4' 11\" (1.499 m)   Wt 82.6 kg (182 lb)   SpO2 100%   BMI 36.76 kg/m²    O2 Flow Rate (L/min): 2 l/min O2 Device: Nasal cannula    Temp (24hrs), Av.6 °F (37 °C), Min:98 °F (36.7 °C), Max:99.6 °F (37.6 °C)      No intake/output data recorded. No intake/output data recorded. PHYSICAL EXAM:  Constitutional: No acute distress  Skin: Extremities and face reveal no rashes. HEENT: Sclerae anicteric. Extra-occular muscles are intact. No oral ulcers. The neck is supple and no masses. Cardiovascular: Regular rate and rhythm. Respiratory:  Clear breath sounds bilaterally with no wheezes, rales, or rhonchi. GI: Abdomen nondistended, soft, and nontender. Normal active bowel sounds. Musculoskeletal: No pitting edema of the lower legs. Able to move all ext  Neurological:  Patient is alert and oriented. Cranial nerves II-XII grossly intact  Psychiatric: Mood appears appropriate       Data Review    Recent Results (from the past 24 hour(s))   GLUCOSE, POC    Collection Time: 21  9:29 AM   Result Value Ref Range    Glucose (POC) 132 (H) 65 - 117 mg/dL    Performed by Manuel Medel, POC    Collection Time: 21 12:55 PM   Result Value Ref Range    Glucose (POC) 127 (H) 65 - 117 mg/dL    Performed by Susanne Miller    PTT    Collection Time: 21  4:05 PM   Result Value Ref Range    aPTT 74.4 (H) 21.2 - 34.1 sec    aPTT, therapeutic range   82 - 109 sec   GLUCOSE, POC    Collection Time: 21  8:53 PM   Result Value Ref Range    Glucose (POC) 122 (H) 65 - 117 mg/dL    Performed by Corina Gooden (PCT)    GLUCOSE, POC    Collection Time: 21  7:21 AM   Result Value Ref Range    Glucose (POC) 113 65 - 117 mg/dL    Performed by Mahi Lantigua        Radiology review: ECHO    Assessment:   1. Acute non-STEMI  2. Acute on chronic systolic/diastolic CHF with EF of 30 to 35% exacerbation  3.  Acute on CKD stage IV with a history of nephrectomy from renal cell carcinoma. 4. Chronic anemia  5. History of coronary artery disease status post CABG and PCI  6. Type 2 diabetes  7. Hypertension  8. Hyperlipidemia  9. PVD with history of left SFA stenosis    Plan:    1. Troponins elevated and peaked at 1191. Offered a cardiac catheterization however patient has a high likelihood of going into dialysis which she declined at this time. Currently on a heparin drip  2. 2D echo shows systolic dysfunction with an EF of 30 to 35% with grade 2 diastolic dysfunction. Patient is currently on IV Lasix 80 mg twice daily. Continue with beta-blocker. Hold ACE due to renal function  3. Nephrology consulted. Holding nephrotoxic medications at this time. Renal ultrasound pending. 4. Hemoglobin 8.0. She was transfused 1 unit packed red blood cells. Continue to monitor and transfuse if patient becomes hemodynamically unstable or hemoglobin less than 7  5. On aspirin, statin, Plavix  6. Continue with Lantus 25 units at bedtime and insulin sliding scale Glucose levels acceptable  7. Blood pressure stable. Continue to monitor per unit protocol. On Norvasc, Lasix, hydralazine, Imdur, metoprolol, Ranexa  8. On statin  9. CBC BMP in a.m. Dispo: 48 hours. Barriers include renal and cardiology clearance. Suspect patient will need home health. Wean oxygen     CODE STATUS Full     DVT prophylaxis: Heparin  Ulcer prophylaxis: Tolerating oral diet    Care Plan discussed with: Patient/Family, Nurse and     Total time spent with patient: 34 minutes.

## 2021-12-29 LAB
GLUCOSE BLD STRIP.AUTO-MCNC: 114 MG/DL (ref 65–117)
GLUCOSE BLD STRIP.AUTO-MCNC: 145 MG/DL (ref 65–117)
GLUCOSE BLD STRIP.AUTO-MCNC: 148 MG/DL (ref 65–117)
PERFORMED BY, TECHID: ABNORMAL
PERFORMED BY, TECHID: ABNORMAL
PERFORMED BY, TECHID: NORMAL

## 2021-12-29 PROCEDURE — 74011636637 HC RX REV CODE- 636/637: Performed by: HOSPITALIST

## 2021-12-29 PROCEDURE — 74011250637 HC RX REV CODE- 250/637: Performed by: INTERNAL MEDICINE

## 2021-12-29 PROCEDURE — 74011250636 HC RX REV CODE- 250/636: Performed by: INTERNAL MEDICINE

## 2021-12-29 PROCEDURE — 74011250637 HC RX REV CODE- 250/637: Performed by: HOSPITALIST

## 2021-12-29 PROCEDURE — 82962 GLUCOSE BLOOD TEST: CPT

## 2021-12-29 PROCEDURE — 65270000029 HC RM PRIVATE

## 2021-12-29 RX ORDER — CINACALCET 30 MG/1
30 TABLET, FILM COATED ORAL
Status: DISCONTINUED | OUTPATIENT
Start: 2021-12-29 | End: 2022-01-08 | Stop reason: HOSPADM

## 2021-12-29 RX ADMIN — METOLAZONE 5 MG: 5 TABLET ORAL at 09:14

## 2021-12-29 RX ADMIN — INSULIN GLARGINE 25 UNITS: 100 INJECTION, SOLUTION SUBCUTANEOUS at 21:21

## 2021-12-29 RX ADMIN — FUROSEMIDE 80 MG: 10 INJECTION, SOLUTION INTRAMUSCULAR; INTRAVENOUS at 21:14

## 2021-12-29 RX ADMIN — HYDRALAZINE HYDROCHLORIDE 50 MG: 50 TABLET, FILM COATED ORAL at 16:59

## 2021-12-29 RX ADMIN — SODIUM CHLORIDE, PRESERVATIVE FREE 10 ML: 5 INJECTION INTRAVENOUS at 21:22

## 2021-12-29 RX ADMIN — ISOSORBIDE MONONITRATE 60 MG: 60 TABLET, EXTENDED RELEASE ORAL at 09:17

## 2021-12-29 RX ADMIN — POTASSIUM CHLORIDE 40 MEQ: 750 TABLET, FILM COATED, EXTENDED RELEASE ORAL at 09:13

## 2021-12-29 RX ADMIN — FUROSEMIDE 80 MG: 10 INJECTION, SOLUTION INTRAMUSCULAR; INTRAVENOUS at 12:28

## 2021-12-29 RX ADMIN — CLOPIDOGREL BISULFATE 75 MG: 75 TABLET ORAL at 09:14

## 2021-12-29 RX ADMIN — ASPIRIN 81 MG CHEWABLE TABLET 81 MG: 81 TABLET CHEWABLE at 09:17

## 2021-12-29 RX ADMIN — RANOLAZINE 500 MG: 500 TABLET, FILM COATED, EXTENDED RELEASE ORAL at 09:15

## 2021-12-29 RX ADMIN — SODIUM CHLORIDE, PRESERVATIVE FREE 10 ML: 5 INJECTION INTRAVENOUS at 05:28

## 2021-12-29 RX ADMIN — EPOETIN ALFA-EPBX 10000 UNITS: 10000 INJECTION, SOLUTION INTRAVENOUS; SUBCUTANEOUS at 18:08

## 2021-12-29 RX ADMIN — CETIRIZINE HYDROCHLORIDE 10 MG: 10 TABLET, FILM COATED ORAL at 09:17

## 2021-12-29 RX ADMIN — CINACALCET 30 MG: 30 TABLET, FILM COATED ORAL at 21:17

## 2021-12-29 RX ADMIN — METOPROLOL SUCCINATE 50 MG: 50 TABLET, EXTENDED RELEASE ORAL at 09:17

## 2021-12-29 RX ADMIN — ATORVASTATIN CALCIUM 40 MG: 40 TABLET, FILM COATED ORAL at 21:15

## 2021-12-29 RX ADMIN — INSULIN LISPRO 2 UNITS: 100 INJECTION, SOLUTION INTRAVENOUS; SUBCUTANEOUS at 12:28

## 2021-12-29 RX ADMIN — HYDRALAZINE HYDROCHLORIDE 50 MG: 50 TABLET, FILM COATED ORAL at 09:14

## 2021-12-29 RX ADMIN — AMLODIPINE BESYLATE 10 MG: 5 TABLET ORAL at 09:14

## 2021-12-29 RX ADMIN — RANOLAZINE 500 MG: 500 TABLET, FILM COATED, EXTENDED RELEASE ORAL at 21:14

## 2021-12-29 RX ADMIN — Medication 1000 UNITS: at 09:14

## 2021-12-29 RX ADMIN — HYDRALAZINE HYDROCHLORIDE 50 MG: 50 TABLET, FILM COATED ORAL at 21:14

## 2021-12-29 RX ADMIN — SODIUM CHLORIDE, PRESERVATIVE FREE 10 ML: 5 INJECTION INTRAVENOUS at 17:02

## 2021-12-29 NOTE — PROGRESS NOTES
Progress Note    Patient: Brendan Lemon MRN: [id number]  SSN: [id number]    YOB: 1950  Age: 66 y.o. Sex: female      Admit Date: 12/26/2021    LOS: 3 days     Subjective:     She remains short winded. She sitting up on the bedside. Denied having any chest pain. Objective:     Vitals:    12/28/21 2051 12/28/21 2342 12/29/21 0303 12/29/21 0730   BP: (!) 140/66 (!) 150/69 (!) 140/62 (!) 129/55   Pulse: 83 69 77 79   Resp: 20 18 20 18   Temp: 98.2 °F (36.8 °C) 98.9 °F (37.2 °C) 98.8 °F (37.1 °C) 98.5 °F (36.9 °C)   SpO2: 97% 100% 99% 98%   Weight:       Height:            Intake and Output:  Current Shift: No intake/output data recorded. Last three shifts: 12/27 1901 - 12/29 0700  In: 56 [P.O.:436]  Out: 400 [Urine:400]    Physical Exam:   General:  Alert, cooperative, no distress, appears stated age. Eyes:  Conjunctivae/corneas clear. PERRL, EOMs intact. Fundi benign   Ears:  Normal TMs and external ear canals both ears. Nose: Nares normal. Septum midline. Mucosa normal. No drainage or sinus tenderness. Mouth/Throat: Lips, mucosa, and tongue normal. Teeth and gums normal.   Neck: Supple, symmetrical, trachea midline, no adenopathy, thyroid: no enlargment/tenderness/nodules, no carotid bruit and no JVD. Back:   Symmetric, no curvature. ROM normal. No CVA tenderness. Lungs:   Clear to auscultation bilaterally. Heart:  Regular rate and rhythm, S1, S2 normal, no murmur, click, rub or gallop. Abdomen:   Soft, non-tender. Bowel sounds normal. No masses,  No organomegaly. Extremities: Extremities normal, atraumatic, no cyanosis or edema. Pulses: 2+ and symmetric all extremities. Skin: Skin color, texture, turgor normal. No rashes or lesions   Lymph nodes: Cervical, supraclavicular, and axillary nodes normal.   Neurologic: CNII-XII intact. Normal strength, sensation and reflexes throughout. Lab/Data Review: All lab results for the last 24 hours reviewed. Assessment:     Principal Problem:    NSTEMI (non-ST elevated myocardial infarction) ([facility] (12/26/2021)    Active Problems:    HTN (hypertension) (12/26/2021)      HLD (hyperlipidemia) (12/26/2021)      Edema (12/26/2021)      Type 2 diabetes mellitus, with long-term current use of insulin ([facility] (12/26/2021)      CKD (chronic kidney disease) stage 5, GFR less than 15 ml/min ([facility]) (12/26/2021)      Anemia (12/26/2021)      Acquired absence of kidney (12/26/2021)         Patient is a 58-year-old -American female with:  1. Non-STEMI  2. Heart failure with decompensation  3. Anemia  4. Status post nephrectomy for renal cell carcinoma  5. Peripheral vascular disease status post renal artery stenting  6. Diabetes mellitus  7. Hyperlipidemia  8. Hypertension  9. Hypokalemia  Plan:     There is no accurate ins and outs. Anyhow she has poor response to diuretic. She remains short winded. She is agreeable for dialysis now. She is high risk for decompensation especially with her cardiomyopathy. We will discuss with nephrology. Labs pending this morning. Currently on amlodipine, aspirin, atorvastatin, Plavix, hydralazine and the IV Lasix, isosorbide and metolazone, metoprolol. Once she started on dialysis we will plan for cardiac catheterization afterward.   Signed By: Edis Chong MD     December 29, 2021

## 2021-12-29 NOTE — PROGRESS NOTES
Patient: Brendan Lemon MRN: [id number]     YOB: 1950  Age: 66 y.o. Sex: female      Consult requested by: Erma Paul MD    Subjective: The patient is seen in the room.   She feels little better today  On IV Lasix 80 mg twice a day plus added metolazone  Creatinine is 5.7    Past Medical History:  Past Medical History:   Diagnosis Date    CAD (coronary artery disease)     Diabetes ([facility]     Heart failure ([facility]     Hypertension        Past Surgical History:  Past Surgical History:   Procedure Laterality Date    [place] CARDIAC SURG PROCEDURE UNLIST         Family History:  Family History   Problem Relation Age of Onset    Hypertension Father        Social History:  Social History     Socioeconomic History    Marital status:      Spouse name: Not on file    Number of children: Not on file    Years of education: Not on file    Highest education level: Not on file   Occupational History    Not on file   Tobacco Use    Smoking status: Former Smoker    Smokeless tobacco: Former User     Quit date: 1/1/2018   Vaping Use    Vaping Use: Never used   Substance and Sexual Activity    Alcohol use: Not Currently    Drug use: Never    Sexual activity: Not on file   Other Topics Concern     Service Not Asked    Blood Transfusions Not Asked    Caffeine Concern Not Asked    Occupational Exposure Not Asked    Hobby Hazards Not Asked    Sleep Concern Not Asked    Stress Concern Not Asked    Weight Concern Not Asked    Special Diet Not Asked    Back Care Not Asked    Exercise Not Asked    Bike Helmet Not Asked   [address] Not Asked    Self-Exams Not Asked   Social History Narrative    Not on file     Social Determinants of Health     Financial Resource Strain:     Difficulty of Paying Living Expenses: Not on file   Food Insecurity:     Worried About Running Out of Food in the Last Year: Not on file    Ran Out of Food in the Last Year: Not on file   Transportation Needs:     Lack of Transportation (Medical): Not on file    Lack of Transportation (Non-Medical): Not on file   Physical Activity:     Days of Exercise per Week: Not on file    Minutes of Exercise per Session: Not on file   Stress:     Feeling of Stress : Not on file   Social Connections:     Frequency of Communication with Friends and Family: Not on file    Frequency of Social Gatherings with Friends and Family: Not on file    Attends Sabianist Services: Not on file    Active Member of [address] [place] [address] [employer] or Organizations: Not on file    Attends Club or Organization Meetings: Not on file    Marital Status: Not on file   Intimate Partner Violence:     Fear of Current or Ex-Partner: Not on file    Emotionally Abused: Not on file    Physically Abused: Not on file    Sexually Abused: Not on file   Housing Stability:     Unable to Pay for Housing in the Last Year: Not on file    Number of [place] in the Last Year: Not on file    Unstable Housing in the Last Year: Not on file       Allergies   Allergen Reactions    Penicillin G Hives       Review of Systems:  No fever or chills. No sore throat. No cough or hemoptysis. + shortness of breath &  chest pain. +  orthopnea     No nausea, vomiting, abdominal pain, melena or hematochezia.    + B/L LE swelling, no joint paints. No muscle aches. No skin changes. No dizziness or lightheadedness. No headaches. Objective:     Vitals:    12/29/21 0303 12/29/21 0730 12/29/21 1310 12/29/21 1551   BP: (!) 140/62 (!) 129/55 (!) 137/56 (!) 150/70   Pulse: 77 79 71 73   Resp: 20 18 18 18   Temp: 98.8 °F (37.1 °C) 98.5 °F (36.9 °C) 98.7 °F (37.1 °C) 98.7 °F (37.1 °C)   SpO2: 99% 98% 100% 99%   Weight:       Height:            Intake and Output:  Current Shift: No intake/output data recorded.   Last three shifts: 12/27 1901 - 12/29 0700  In: 436 [P.O.:436]  Out: 400 [Urine:400]    Physical Exam:   GENERAL: alert, cooperative, no distress, appears stated age  EYE: negative  THROAT & NECK: normal, no erythema or exudates noted. , and JVD  LUNG: clear to auscultation bilaterally, RT LE lower 1/3 fine rales,  Left side clear   HEART: regular rate and rhythm, S1, S2 normal, no murmur, click, rub or gallop  ABDOMEN: soft, non-tender. Bowel sounds normal. No masses,  no organomegaly   - No fallon  EXTREMITIES:  2+ pitting edema  SKIN: Normal. and no rash or abnormalities  NEUROLOGIC: negative , no gross neuro deficits. PSYCHIATRIC: non focal    Data Review    Recent Results (from the past 24 hour(s))   GLUCOSE, POC    Collection Time: 12/28/21  5:11 PM   Result Value Ref Range    Glucose (POC) 143 (H) 65 - 117 mg/dL    Performed by [employer], POC    Collection Time: 12/28/21 10:36 PM   Result Value Ref Range    Glucose (POC) 135 (H) 65 - 117 mg/dL    Performed by Grayson Lewis, POC    Collection Time: 12/29/21 12:21 PM   Result Value Ref Range    Glucose (POC) 148 (H) 65 - 117 mg/dL    Performed by 1 Gerardo Swenson, POC    Collection Time: 12/29/21  4:02 PM   Result Value Ref Range    Glucose (POC) 114 65 - 117 mg/dL    Performed by ERIN TONG           Imaging -     1. Cxray - PROCEDURE: XR CHEST PORT     HISTORY:Short of breath     COMPARISON:None        TECHNIQUE: AP portable chest     LIMITATIONS: None     TUBES/LINES: None     LUNG PARENCHYMA/PLEURA: Calcified nodule in the right lung base. Mildly  increased interstitial markings bilaterally. No pleural effusion although there  is some subpleural edema  TRACHEA/BRONCHI: Normal  PULMONARY VESSELS: Mild cephalization of the vasculature  HEART: Mild cardiomegaly. There is evidence of previous cardiac surgery and  stent placement.   MEDIASTINUM: Normal  BONE/SOFT TISSUES: No acute process     OTHER: None     IMPRESSION  Mild cardiomegaly with mild changes of cardiac decompensation in the  lungs. No florid CHF. .        EKG - Normal sinus rhythm   Marked ST abnormality, possible lateral subendocardial injury   Abnormal ECG   When compared with ECG of 25-DEC-2021 22:51,   PREVIOUS ECG IS PRESENT   Confirmed by PACO MACARIO, Constanza Napier ([id number]) on 12/26/2021 12:16:42 PM     Assessment & Plan:     Hospital Problems  Date Reviewed: 12/26/2021          Codes Class Noted POA    * (Principal) NSTEMI (non-ST elevated myocardial infarction) ([facility] ICD-10-CM: I21.4  ICD-9-CM: 410.70  12/26/2021 Yes        HTN (hypertension) ICD-10-CM: I10  ICD-9-CM: 401.9  12/26/2021 Yes        HLD (hyperlipidemia) ICD-10-CM: E78.5  ICD-9-CM: 272.4  12/26/2021 Yes        Edema ICD-10-CM: R60.9  ICD-9-CM: 782.3  12/26/2021 Yes        Type 2 diabetes mellitus, with long-term current use of insulin (HCC) ICD-10-CM: E11.9, Z79.4  ICD-9-CM: 250.00, V58.67  12/26/2021 Yes        CKD (chronic kidney disease) stage 5, GFR less than 15 ml/min (HCC) ICD-10-CM: N18.5  ICD-9-CM: 585.5  12/26/2021 Yes        Anemia ICD-10-CM: D64.9  ICD-9-CM: 285.9  12/26/2021 Yes        Acquired absence of kidney ICD-10-CM: Z90.5  ICD-9-CM: V45.73  12/26/2021 Unknown            1- JIM on possibly advanced CKD stage unknown:   -Since admission Cr 5.7 with eGFR 9 only  -Unknown baseline.   -she probably had advanced CKD due to which her nephrologist [place] had discussed about starting dialysis with her.  -Renal US showed No right-sided hydronephrosis. s/p left nephrectomy.  -Clinically volume overload, no uremia  -I had a long discussion with the patient about the dialysis but she had strongly refused.    -I will discuss with the patient again tomorrow and if she refuses she can be discharged home and I will follow her up in my office in 1 week. 2-Hypokalemia:  -from loops  -K 3.3  -po KCL 40 meq x 2 doses  12/28  -cont KCL 40 meq daily      3-Anemia:  -likely due to CKD. -Received 1 unit of PRBC in the ED.   -pending iron studies  -started on Epo sq MWF.    4-NSTEMI   -started on heparin drip.  -Echo LVEF 30-35%  -Cardio possibly planning for CC    5-Hypertension:  -blood pressure currently better controlled. -will keep amlodipine 10 mg/Imdur 60 mg/metoprolol 50 mg twice a day and hydralazine 50 mg 3 times daily. 6-CHF:  -Decompensated and fluid overload due to #1  -LVEF 30 to 35%  -On IV Lasix 80 mg twice a day  -Still complaining of orthopnea  -added metolazone 5 mg twice a day  -Plan to consider dialysis in the setting of poor diuretic response.     7-Renal osteodystrophy:  -Ca 10.4, Phos 3.5  - iPTH 236 and Vit D 50.1  -likely primary HPT  -will add sensipar 30 mg daily    Signed By: Laurie Conteh MD     December 29, 2021

## 2021-12-29 NOTE — PROGRESS NOTES
Problem: Falls - Risk of  Goal: *Absence of Falls  Description: Document James Melton Fall Risk and appropriate interventions in the flowsheet.   Outcome: Progressing Towards Goal  Note: Fall Risk Interventions:  Mobility Interventions: Patient to call before getting OOB,PT Consult for mobility concerns         Medication Interventions: Patient to call before getting OOB,Teach patient to arise slowly                   Problem: Patient Education: Go to Patient Education Activity  Goal: Patient/Family Education  Outcome: Progressing Towards Goal

## 2021-12-29 NOTE — PROGRESS NOTES
Hospitalist Progress Note    Subjective:   Daily Progress Note: 12/29/2021 3:59 PM    Hospital Course:  65-year-old female from [place] that has a history of coronary artery disease with CABG in 2001 and PCI as well as non-STEMI, stage V chronic kidney disease with a unilateral kidney, diabetes, history of nephrectomy for renal cell carcinoma who came to the ED in [place] [address] with complaints of shortness of breath and left-sided chest pain, and lower extremity edema. In the ED she was hypertensive with otherwise stable vital signs. Labs showed a hemoglobin of 7.1, potassium 2.9, creatinine 5.5 which is her baseline. Initial troponin was 876 with a repeat of 1068. She was transfused 1 unit of blood. Due to concerns of non-STEMI started on a heparin drip as well. Cardiology and nephrology consulted. 2D echo showed an EF of 30 to 35% with apical dyskinesis and inferior and inferior lateral hypokinesis. Cardiology plan for cardiac cath once patient is started on dialysis.        Subjective:  Patient was sitting on the bed. Denies any new complaints. Still undecided about dialysis and cardiac catheterization.     Current Facility-Administered Medications   Medication Dose Route Frequency    metOLazone (ZAROXOLYN) tablet 5 mg  5 mg Oral BID    hydrALAZINE (APRESOLINE) tablet 50 mg  50 mg Oral TID    epoetin ko-epbx (RETACRIT) injection 10,000 Units  10,000 Units SubCUTAneous Q MON, WED & FRI    glucose chewable tablet 16 g  4 Tablet Oral PRN    dextrose (D50W) injection syrg 12.5-25 g  25-50 mL IntraVENous PRN    glucagon (GLUCAGEN) injection 1 mg  1 mg IntraMUSCular PRN    sodium chloride (NS) flush 5-40 mL  5-40 mL IntraVENous Q8H    sodium chloride (NS) flush 5-40 mL  5-40 mL IntraVENous PRN    insulin glargine (LANTUS) injection 25 Units  25 Units SubCUTAneous QHS    insulin lispro (HUMALOG) injection   SubCUTAneous AC&HS    acetaminophen (TYLENOL) tablet 650 mg  650 mg Oral Q4H PRN    0.9% sodium chloride infusion 250 mL  250 mL IntraVENous PRN    potassium chloride SR (KLOR-CON 10) tablet 40 mEq  40 mEq Oral DAILY    aspirin chewable tablet 81 mg  81 mg Oral DAILY    cetirizine (ZYRTEC) tablet 10 mg  10 mg Oral DAILY    clopidogreL (PLAVIX) tablet 75 mg  75 mg Oral DAILY    metoprolol succinate (TOPROL-XL) XL tablet 50 mg  50 mg Oral DAILY    nitroglycerin (NITROSTAT) tablet 0.4 mg  0.4 mg SubLINGual Q5MIN PRN    ranolazine ER (RANEXA) tablet 500 mg  500 mg Oral BID    atorvastatin (LIPITOR) tablet 40 mg  40 mg Oral QHS    amLODIPine (NORVASC) tablet 10 mg  10 mg Oral DAILY    cholecalciferol (VITAMIN D3) (1000 Units /25 mcg) tablet 1,000 Units  1,000 Units Oral DAILY    furosemide (LASIX) injection 80 mg  80 mg IntraVENous BID    isosorbide mononitrate ER (IMDUR) tablet 60 mg  60 mg Oral DAILY    ondansetron (ZOFRAN) injection 4 mg  4 mg IntraVENous Q6H PRN        Review of Systems  Constitutional: No fevers, No chills, No sweats, No fatigue, No Weakness  Eyes: No redness  Ears, nose, mouth, throat, and face: No nasal congestion, No sore throat, No voice change  Respiratory: No Shortness of Breath, No cough, No wheezing  Cardiovascular: No chest pain, No palpitations, No extremity edema  Gastrointestinal: No nausea, No vomiting, No diarrhea, No abdominal pain  Genitourinary: No frequency, No dysuria, No hematuria  Integument/breast: No skin lesion(s)   Neurological: No Confusion, No headaches, No dizziness      Objective:     Visit Vitals  BP (!) 150/70 (BP 1 Location: Right upper arm, BP Patient Position: Sitting)   Pulse 73   Temp 98.7 °F (37.1 °C)   Resp 18   Ht 4' 11\" (1.499 m)   Wt 82.6 kg (182 lb)   SpO2 99%   BMI 36.76 kg/m²    O2 Flow Rate (L/min): 1 l/min O2 Device: Nasal cannula    Temp (24hrs), Av.6 °F (37 °C), Min:98.2 °F (36.8 °C), Max:98.9 °F (37.2 °C)      No intake/output data recorded.   1901 -  0700  In: 436 [P.O.:436]  Out: 400 [Urine:400]    PHYSICAL EXAM:  Constitutional: No acute distress  Skin: Extremities and face reveal no rashes. HEENT: Sclerae anicteric. Extra-occular muscles are intact. No oral ulcers. The neck is supple and no masses. Cardiovascular: Regular rate and rhythm. Respiratory:  B/L decrease air entry   GI: Abdomen nondistended, soft, and nontender. Normal active bowel sounds. Musculoskeletal: No pitting edema of the lower legs. Able to move all ext  Neurological:  Patient is alert and oriented. Cranial nerves II-XII grossly intact  Psychiatric: Mood appears appropriate       Data Review    Recent Results (from the past 24 hour(s))   GLUCOSE, POC    Collection Time: 12/28/21  5:11 PM   Result Value Ref Range    Glucose (POC) 143 (H) 65 - 117 mg/dL    Performed by Wyvonne Brittle, POC    Collection Time: 12/28/21 10:36 PM   Result Value Ref Range    Glucose (POC) 135 (H) 65 - 117 mg/dL    Performed by Sharon Carbajal, POC    Collection Time: 12/29/21 12:21 PM   Result Value Ref Range    Glucose (POC) 148 (H) 65 - 117 mg/dL    Performed by Haroldo Kilpatrick / Plan:    1. Acute non-STEMI  Appreciate cardiology consult  Cardiology planning for cardiac cath once patient is started on hemodialysis  Continue aspirin, statin, Plavix    2. Acute on chronic systolic/diastolic CHF with EF of 30 to 35% exacerbation  Continue diuresis    3. Acute on CKD stage IV with a history of nephrectomy from renal cell carcinoma. Appreciate nephrology consult  Ongoing discussion about initiation of hemodialysis    4. Chronic anemia  Anemia of chronic disease  Status post 1 unit of PRBC  We will monitor hemoglobin    5. History of coronary artery disease status post CABG and PCI  Continue aspirin, statin, Plavix    6. Type 2 diabetes  Continue Lantus 25 units SC at bedtime  Continue insulin sliding scale    7. Hypertension  Continue antihypertensive medication    8. Hyperlipidemia      9.  PVD with history of left SFA stenosis  Continue aspirin, statin, Plavix    30.0 - 39.9 Obese / Body mass index is 36.76 kg/m². Code status: Full  Prophylaxis: Hep SQ  Recommended Disposition: Home w/Family       time spent 35 minutes involving direct patient care as well as reviewing patient's labs and coordination of care with nursing staff     Care Plan discussed with: Patient/Family/RN/Case Management        Total time spent with patient: 35 minutes.

## 2021-12-29 NOTE — PROGRESS NOTES
Patient under the impression that she would be leaving today and called her son from [place] to come pick her up. RN informed the patient she did not have a discharge order and the nephrologist wrote in his note that she could maybe leave tomorrow. Dr. Salome Brewster notified and also agrees that patient cannot leave today. When the son got here the RN informed both him and the patient that it was not recommended she leave today but patient can always leave AMA. Patient agreed to stay the night and the son left. RN in room when patient's oldest daughter called and asked for  to call her. Dr. Salome Brewster notified of this.      [place]: Jose Higgins   Cell: [phone]  Home: [phone]

## 2021-12-29 NOTE — PROGRESS NOTES
Spiritual Care Assessment/Progress Note  [facility]      NAME: Suellen Rojas      MRN: [id number]  AGE: 66 y.o.  SEX: female  Religion Affiliation: No preference   Language: English     12/29/2021     Total Time (in minutes): 15     Spiritual Assessment begun in [address] through conversation with:         [x]Patient        [] Family    [] Friend(s)        Reason for Consult: Initial/Spiritual assessment, patient floor     Spiritual beliefs: (Please include comment if needed)     [x] Identifies with a melita tradition:         [] Supported by a melita community:            [] Claims no spiritual orientation:           [] Seeking spiritual identity:                [] Adheres to an individual form of spirituality:           [] Not able to assess:                           Identified resources for coping:      [x] Prayer                               [] Music                  [] Guided Imagery     [x] Family/friends                 [] Pet visits     [] Devotional reading                         [] Unknown     [] Other:                                              Interventions offered during this visit: (See comments for more details)    Patient Interventions: Affirmation of emotions/emotional suffering,Affirmation of melita,Catharsis/review of pertinent events in supportive environment,Coping skills reviewed/reinforced,Guidance concerning next steps/process to be expected,Iconic (affirming the presence of God/Higher Power),Initial/Spiritual assessment, patient floor,Normalization of emotional/spiritual concerns           Plan of Care:     [] Support spiritual and/or cultural needs    [] Support AMD and/or advance care planning process      [] Support grieving process   [] Coordinate Rites and/or Rituals    [] Coordination with community clergy   [] No spiritual needs identified at this time   [] Detailed Plan of Care below (See Comments)  [] Make referral to Music Therapy  [] Make referral to Pet Therapy     [] Make referral to Addiction services  [] Make referral to [facility]  [] Make referral to Spiritual Care Partner  [] No future visits requested        [x] Contact Spiritual Care for further referrals     Comments: The purpose of the visit was in response to a staff referral on the patient. The patient was resting in the bed at the time. She expressed feeling bothered about having to start dialysis. Though she appeared to be low in spirit, she mentioned that she was accepting what God allows. She mentioned have the support of her family and that her melita in God is keeping her. The  provided the ministry of presence and the comfort of spiritual care. [address] [facility] [employer] Mar[employer]    can be reached by calling the  at [facility]  [phone]

## 2021-12-30 ENCOUNTER — APPOINTMENT (OUTPATIENT)
Dept: GENERAL RADIOLOGY | Age: 71
DRG: 280 | End: 2021-12-30
Attending: RADIOLOGY
Payer: MEDICARE

## 2021-12-30 LAB
ALBUMIN SERPL-MCNC: 3.2 G/DL (ref 3.5–5)
ANION GAP SERPL CALC-SCNC: 11 MMOL/L (ref 5–15)
BUN SERPL-MCNC: 72 MG/DL (ref 6–20)
BUN/CREAT SERPL: 11 (ref 12–20)
CA-I BLD-MCNC: 10 MG/DL (ref 8.5–10.1)
CA-I BLD-MCNC: 9.8 MG/DL (ref 8.5–10.1)
CHLORIDE SERPL-SCNC: 96 MMOL/L (ref 97–108)
CO2 SERPL-SCNC: 24 MMOL/L (ref 21–32)
CREAT SERPL-MCNC: 6.45 MG/DL (ref 0.55–1.02)
GLUCOSE BLD STRIP.AUTO-MCNC: 100 MG/DL (ref 65–117)
GLUCOSE BLD STRIP.AUTO-MCNC: 104 MG/DL (ref 65–117)
GLUCOSE BLD STRIP.AUTO-MCNC: 112 MG/DL (ref 65–117)
GLUCOSE SERPL-MCNC: 113 MG/DL (ref 65–100)
PERFORMED BY, TECHID: NORMAL
PHOSPHATE SERPL-MCNC: 4.6 MG/DL (ref 2.6–4.7)
POTASSIUM SERPL-SCNC: 3.7 MMOL/L (ref 3.5–5.1)
PTH-INTACT SERPL-MCNC: 132.8 PG/ML (ref 18.4–88)
SODIUM SERPL-SCNC: 131 MMOL/L (ref 136–145)

## 2021-12-30 PROCEDURE — 74011250637 HC RX REV CODE- 250/637: Performed by: HOSPITALIST

## 2021-12-30 PROCEDURE — 80069 RENAL FUNCTION PANEL: CPT

## 2021-12-30 PROCEDURE — 65270000029 HC RM PRIVATE

## 2021-12-30 PROCEDURE — 82962 GLUCOSE BLOOD TEST: CPT

## 2021-12-30 PROCEDURE — 74011636637 HC RX REV CODE- 636/637: Performed by: HOSPITALIST

## 2021-12-30 PROCEDURE — 36415 COLL VENOUS BLD VENIPUNCTURE: CPT

## 2021-12-30 PROCEDURE — 74011250637 HC RX REV CODE- 250/637: Performed by: INTERNAL MEDICINE

## 2021-12-30 PROCEDURE — 71045 X-RAY EXAM CHEST 1 VIEW: CPT

## 2021-12-30 PROCEDURE — 74011250636 HC RX REV CODE- 250/636: Performed by: INTERNAL MEDICINE

## 2021-12-30 RX ORDER — FUROSEMIDE 40 MG/1
80 TABLET ORAL DAILY
Status: DISCONTINUED | OUTPATIENT
Start: 2021-12-30 | End: 2021-12-31

## 2021-12-30 RX ADMIN — FUROSEMIDE 80 MG: 40 TABLET ORAL at 12:08

## 2021-12-30 RX ADMIN — METOPROLOL SUCCINATE 50 MG: 50 TABLET, EXTENDED RELEASE ORAL at 09:27

## 2021-12-30 RX ADMIN — CLOPIDOGREL BISULFATE 75 MG: 75 TABLET ORAL at 09:28

## 2021-12-30 RX ADMIN — SODIUM CHLORIDE, PRESERVATIVE FREE 10 ML: 5 INJECTION INTRAVENOUS at 21:31

## 2021-12-30 RX ADMIN — FUROSEMIDE 80 MG: 10 INJECTION, SOLUTION INTRAMUSCULAR; INTRAVENOUS at 09:29

## 2021-12-30 RX ADMIN — ASPIRIN 81 MG CHEWABLE TABLET 81 MG: 81 TABLET CHEWABLE at 09:27

## 2021-12-30 RX ADMIN — POTASSIUM CHLORIDE 40 MEQ: 750 TABLET, FILM COATED, EXTENDED RELEASE ORAL at 09:27

## 2021-12-30 RX ADMIN — CETIRIZINE HYDROCHLORIDE 10 MG: 10 TABLET, FILM COATED ORAL at 09:28

## 2021-12-30 RX ADMIN — AMLODIPINE BESYLATE 10 MG: 5 TABLET ORAL at 09:28

## 2021-12-30 RX ADMIN — RANOLAZINE 500 MG: 500 TABLET, FILM COATED, EXTENDED RELEASE ORAL at 20:43

## 2021-12-30 RX ADMIN — HYDRALAZINE HYDROCHLORIDE 50 MG: 50 TABLET, FILM COATED ORAL at 09:29

## 2021-12-30 RX ADMIN — RANOLAZINE 500 MG: 500 TABLET, FILM COATED, EXTENDED RELEASE ORAL at 09:28

## 2021-12-30 RX ADMIN — SODIUM CHLORIDE, PRESERVATIVE FREE 10 ML: 5 INJECTION INTRAVENOUS at 14:12

## 2021-12-30 RX ADMIN — INSULIN GLARGINE 25 UNITS: 100 INJECTION, SOLUTION SUBCUTANEOUS at 22:00

## 2021-12-30 RX ADMIN — CINACALCET 30 MG: 30 TABLET, FILM COATED ORAL at 21:30

## 2021-12-30 RX ADMIN — ISOSORBIDE MONONITRATE 60 MG: 60 TABLET, EXTENDED RELEASE ORAL at 09:29

## 2021-12-30 RX ADMIN — ATORVASTATIN CALCIUM 40 MG: 40 TABLET, FILM COATED ORAL at 21:30

## 2021-12-30 RX ADMIN — ONDANSETRON 4 MG: 2 INJECTION INTRAMUSCULAR; INTRAVENOUS at 00:06

## 2021-12-30 RX ADMIN — SODIUM CHLORIDE, PRESERVATIVE FREE 10 ML: 5 INJECTION INTRAVENOUS at 05:01

## 2021-12-30 RX ADMIN — HYDRALAZINE HYDROCHLORIDE 50 MG: 50 TABLET, FILM COATED ORAL at 18:29

## 2021-12-30 RX ADMIN — Medication 1000 UNITS: at 09:27

## 2021-12-30 RX ADMIN — METOLAZONE 5 MG: 5 TABLET ORAL at 09:28

## 2021-12-30 RX ADMIN — HYDRALAZINE HYDROCHLORIDE 50 MG: 50 TABLET, FILM COATED ORAL at 21:30

## 2021-12-30 NOTE — PROGRESS NOTES
Progress Note    Patient: Junito Juarez MRN: [id number]  SSN: [id number]    YOB: 1950  Age: 66 y.o. Sex: female      Admit Date: 12/26/2021    LOS: 4 days     Subjective:     She remains short winded. She sitting up on the bedside. Denied having any chest pain. Objective:     Vitals:    12/29/21 2248 12/30/21 0412 12/30/21 0745 12/30/21 1152   BP: (!) 159/64 139/63 (!) 147/59 (!) 142/60   Pulse: 77 79 76 71   Resp: 18 16 18 18   Temp: 98.4 °F (36.9 °C) 98.1 °F (36.7 °C) 98.6 °F (37 °C) 97.6 °F (36.4 °C)   SpO2: 100% 98% 95% 97%   Weight:       Height:            Intake and Output:  Current Shift: No intake/output data recorded. Last three shifts: 12/28 1901 - 12/30 0700  In: 300 [P.O.:300]  Out: 900 [Urine:900]    Physical Exam:   General:  Alert, cooperative, no distress, appears stated age. Eyes:  Conjunctivae/corneas clear. PERRL, EOMs intact. Fundi benign   Ears:  Normal TMs and external ear canals both ears. Nose: Nares normal. Septum midline. Mucosa normal. No drainage or sinus tenderness. Mouth/Throat: Lips, mucosa, and tongue normal. Teeth and gums normal.   Neck: Supple, symmetrical, trachea midline, no adenopathy, thyroid: no enlargment/tenderness/nodules, no carotid bruit and no JVD. Back:   Symmetric, no curvature. ROM normal. No CVA tenderness. Lungs:   Clear to auscultation bilaterally. Heart:  Regular rate and rhythm, S1, S2 normal, no murmur, click, rub or gallop. Abdomen:   Soft, non-tender. Bowel sounds normal. No masses,  No organomegaly. Extremities: Extremities normal, atraumatic, no cyanosis or edema. Pulses: 2+ and symmetric all extremities. Skin: Skin color, texture, turgor normal. No rashes or lesions   Lymph nodes: Cervical, supraclavicular, and axillary nodes normal.   Neurologic: CNII-XII intact. Normal strength, sensation and reflexes throughout. Lab/Data Review: All lab results for the last 24 hours reviewed.          Assessment: Principal Problem:    NSTEMI (non-ST elevated myocardial infarction) ([facility] [address] (12/26/2021)    Active Problems:    HTN (hypertension) (12/26/2021)      HLD (hyperlipidemia) (12/26/2021)      Edema (12/26/2021)      Type 2 diabetes mellitus, with long-term current use of insulin ([facility] [address] (12/26/2021)      CKD (chronic kidney disease) stage 5, GFR less than 15 ml/min (HCC) (12/26/2021)      Anemia (12/26/2021)      Acquired absence of kidney (12/26/2021)         Patient is a 48-year-old -American female with:  1. Non-STEMI  2. Heart failure with decompensation  3. Anemia  4. Status post nephrectomy for renal cell carcinoma  5. Peripheral vascular disease status post renal artery stenting  6. Diabetes mellitus  7. Hyperlipidemia  8. Hypertension  9. Hypokalemia  Plan:     Patient agreed to proceed with hemodialysis. Plans noticed for dialysis sessions today and tomorrow. Once she is more hemodynamically stable and more euvolemic then will proceed with cardiac catheterization.   Signed By: Juarez Luna MD     December 30, 2021

## 2021-12-30 NOTE — PROGRESS NOTES
CM in to see patient at bedside. Patient states she has agreed to dialysis and will be scheduled for her catheter placement today per nephrology. CM will continue to follow for dialysis chair setup at discharge.

## 2021-12-30 NOTE — PROGRESS NOTES
Patient: Des Joshi MRN: [id number]     YOB: 1950  Age: 66 y.o. Sex: female      Consult requested by: Gage Sánchez MD    Subjective: The patient is seen in the room. I had a long discussion with the patient and eventually she agreed to proceed for the dialysis after I had answered a lot of her questions. Plan to place a temporary dialysis catheter which we can eventually exchange to permanent catheter. I have already spoken with the  to set up her dialysis chair close to her home in [place].     Past Medical History:  Past Medical History:   Diagnosis Date    CAD (coronary artery disease)     Diabetes ([facility] [address]     Heart failure ([facility] [address]     Hypertension        Past Surgical History:  Past Surgical History:   Procedure Laterality Date    UT CARDIAC SURG PROCEDURE UNLIST         Family History:  Family History   Problem Relation Age of Onset    Hypertension Father        Social History:  Social History     Socioeconomic History    Marital status:      Spouse name: Not on file    Number of children: Not on file    Years of education: Not on file    Highest education level: Not on file   Occupational History    Not on file   Tobacco Use    Smoking status: Former Smoker    Smokeless tobacco: Former User     Quit date: 1/1/2018   Vaping Use    Vaping Use: Never used   Substance and Sexual Activity    Alcohol use: Not Currently    Drug use: Never    Sexual activity: Not on file   Other Topics Concern     Service Not Asked    Blood Transfusions Not Asked    Caffeine Concern Not Asked    Occupational Exposure Not Asked    Hobby Hazards Not Asked    Sleep Concern Not Asked    Stress Concern Not Asked    Weight Concern Not Asked    Special Diet Not Asked    Back Care Not Asked    Exercise Not Asked    Bike Helmet Not Asked   [place] Not Asked    Self-Exams Not Asked   Social History Narrative    Not on file     Social Determinants of Health     Financial Resource Strain:     Difficulty of Paying Living Expenses: Not on file   Food Insecurity:     Worried About Running Out of Food in the Last Year: Not on file    Kathryn of Food in the Last Year: Not on file   Transportation Needs:     Lack of Transportation (Medical): Not on file    Lack of Transportation (Non-Medical): Not on file   Physical Activity:     Days of Exercise per Week: Not on file    Minutes of Exercise per Session: Not on file   Stress:     Feeling of Stress : Not on file   Social Connections:     Frequency of Communication with Friends and Family: Not on file    Frequency of Social Gatherings with Friends and Family: Not on file    Attends Druze Services: Not on file    Active Member of [address] [place] [address] or Organizations: Not on file    Attends Club or Organization Meetings: Not on file    Marital Status: Not on file   Intimate Partner Violence:     Fear of Current or Ex-Partner: Not on file    Emotionally Abused: Not on file    Physically Abused: Not on file    Sexually Abused: Not on file   Housing Stability:     Unable to Pay for Housing in the Last Year: Not on file    Number of [place] in the Last Year: Not on file    Unstable Housing in the Last Year: Not on file       Allergies   Allergen Reactions    Penicillin G Hives       Review of Systems:  No fever or chills. No sore throat. No cough or hemoptysis. + shortness of breath &  chest pain. +  orthopnea     No nausea, vomiting, abdominal pain, melena or hematochezia.    + B/L LE swelling, no joint paints. No muscle aches. No skin changes. No dizziness or lightheadedness. No headaches.      Objective:     Vitals:    12/29/21 2248 12/30/21 0412 12/30/21 0745 12/30/21 1152   BP: (!) 159/64 139/63 (!) 147/59 (!) 142/60   Pulse: 77 79 76 71   Resp: 18 16 18 18   Temp: 98.4 °F (36.9 °C) 98.1 °F (36.7 °C) 98.6 °F (37 °C) 97.6 °F (36.4 °C)   SpO2: 100% 98% 95% 97%   Weight:       Height:            Intake and Output:  Current Shift: No intake/output data recorded. Last three shifts: 12/28 1901 - 12/30 0700  In: 300 [P.O.:300]  Out: 900 [Urine:900]    Physical Exam:   GENERAL: alert, cooperative, no distress, appears stated age  EYE: negative  THROAT & NECK: normal, no erythema or exudates noted. , and JVD  LUNG: clear to auscultation bilaterally, RT LE lower 1/3 fine rales,  Left side clear   HEART: regular rate and rhythm, S1, S2 normal, no murmur, click, rub or gallop  ABDOMEN: soft, non-tender. Bowel sounds normal. No masses,  no organomegaly   - No fallon  EXTREMITIES:  2+ pitting edema  SKIN: Normal. and no rash or abnormalities  NEUROLOGIC: negative , no gross neuro deficits. PSYCHIATRIC: non focal    Data Review    Recent Results (from the past 24 hour(s))   GLUCOSE, POC    Collection Time: 12/29/21 12:21 PM   Result Value Ref Range    Glucose (POC) 148 (H) 65 - 117 mg/dL    Performed by [address], POC    Collection Time: 12/29/21  4:02 PM   Result Value Ref Range    Glucose (POC) 114 65 - 117 mg/dL    Performed by ERIN TONG    GLUCOSE, POC    Collection Time: 12/29/21  8:06 PM   Result Value Ref Range    Glucose (POC) 145 (H) 65 - 117 mg/dL    Performed by [address] [place] [address], POC    Collection Time: 12/30/21  9:03 AM   Result Value Ref Range    Glucose (POC) 104 65 - 117 mg/dL    Performed by Bee Bolden           Imaging -     1. Cxray - PROCEDURE: XR CHEST PORT     HISTORY:Short of breath     COMPARISON:None        TECHNIQUE: AP portable chest     LIMITATIONS: None     TUBES/LINES: None     LUNG PARENCHYMA/PLEURA: Calcified nodule in the right lung base. Mildly  increased interstitial markings bilaterally. No pleural effusion although there  is some subpleural edema  TRACHEA/BRONCHI: Normal  PULMONARY VESSELS: Mild cephalization of the vasculature  HEART: Mild cardiomegaly.  There is evidence of previous cardiac surgery and  stent placement. MEDIASTINUM: Normal  BONE/SOFT TISSUES: No acute process     OTHER: None     IMPRESSION  Mild cardiomegaly with mild changes of cardiac decompensation in the  lungs. No florid CHF. .        EKG - Normal sinus rhythm   Marked ST abnormality, possible lateral subendocardial injury   Abnormal ECG   When compared with ECG of 25-DEC-2021 22:51,   PREVIOUS ECG IS PRESENT   Confirmed by PACO MACARIO, Garret Hummel ([id number]) on 12/26/2021 12:16:42 PM     Assessment & Plan:     Hospital Problems  Date Reviewed: 12/26/2021          Codes Class Noted POA    * (Principal) NSTEMI (non-ST elevated myocardial infarction) ([facility] [address] ICD-10-CM: I21.4  ICD-9-CM: 410.70  12/26/2021 Yes        HTN (hypertension) ICD-10-CM: I10  ICD-9-CM: 401.9  12/26/2021 Yes        HLD (hyperlipidemia) ICD-10-CM: E78.5  ICD-9-CM: 272.4  12/26/2021 Yes        Edema ICD-10-CM: R60.9  ICD-9-CM: 782.3  12/26/2021 Yes        Type 2 diabetes mellitus, with long-term current use of insulin (HCC) ICD-10-CM: E11.9, Z79.4  ICD-9-CM: 250.00, V58.67  12/26/2021 Yes        CKD (chronic kidney disease) stage 5, GFR less than 15 ml/min (HCC) ICD-10-CM: N18.5  ICD-9-CM: 585.5  12/26/2021 Yes        Anemia ICD-10-CM: D64.9  ICD-9-CM: 285.9  12/26/2021 Yes        Acquired absence of kidney ICD-10-CM: Z90.5  ICD-9-CM: V45.73  12/26/2021 Unknown            1- JIM on possibly advanced CKD stage unknown:   -Since admission Cr 5.7 with eGFR 9 only  -Unknown baseline.   -she probably had advanced CKD due to which her nephrologist KEN RICKS [facility] had discussed about starting dialysis with her.  -Renal US showed No right-sided hydronephrosis.   s/p left nephrectomy.  -Clinically volume overload, no uremia  -I had a long discussion with the patient and eventually she agreed to proceed for the dialysis after I had answered a lot of her questions.  -Plan to place a temporary dialysis catheter which we can eventually exchange to permanent catheter.   -I have already spoken with the  to set up her dialysis chair close to her home in [place]. -plan for HD today and tomorrow. 2-Hypokalemia:  -from loops and thiazides  -will DC metolazone and decrease Lasix dose.  -will dc KCL. -will use 3K bath. 3-Anemia:  -likely due to CKD. -Received 1 unit of PRBC in the ED. -pending iron studies  -started on Epo sq MWF.    4-NSTEMI   -started on heparin drip.  -Echo LVEF 30-35%  -Cardio can proceed for CC now. 5-Hypertension:  -blood pressure currently better controlled. -will keep amlodipine 10 mg/Imdur 60 mg/metoprolol 50 mg twice a day and hydralazine 50 mg 3 times daily.     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

## 2021-12-30 NOTE — PROGRESS NOTES
Hospitalist Progress Note               Daily Progress Note: 12/30/2021      Subjective: The patient is seen for follow up. Is a 79-year-old female with a history of coronary artery disease with CABG in 2001 and PCI as well as non-STEMI, stage V chronic kidney disease with a unilateral kidney, diabetes, history of nephrectomy for renal cell carcinoma who came to the ED late last night with complaints of shortness of breath and left-sided chest pain. Also with lower extremity edema. In the ED she was hypertensive with otherwise stable vital signs. Labs showed a hemoglobin of 7.1, potassium 2.9, creatinine 5.5 which is her baseline. Initial troponin was 876 with a repeat of 1068. Patient is from [place] and previously received all her care at [facility] but she went to [facility] instead because she did not trust the other hospital    Patient is normally followed by a cardiologist in [place]. Her nephrologist has previously recommended that she start dialysis but patient has refused    Her troponin peaked at 1191    Patient was initially refusing consideration of hemodialysis but after further discussion with nephrology, she is agreeable. Cardiology will plan on cardiac catheterization I believe on Monday    Echo showed an EF of 30 to 35% with mild to moderate mitral insufficiency and mild mitral stenosis. There was a small pericardial effusion    ---------    Patient seen for follow-up. No new problems. Breathing comfortably on room air.   She is to start hemodialysis today      Problem List:  Problem List as of 12/30/2021 Date Reviewed: 12/26/2021          Codes Class Noted - Resolved    * (Principal) NSTEMI (non-ST elevated myocardial infarction) ([facility] [address] ICD-10-CM: I21.4  ICD-9-CM: 410.70  12/26/2021 - Present        HTN (hypertension) ICD-10-CM: I10  ICD-9-CM: 401.9  12/26/2021 - Present        HLD (hyperlipidemia) ICD-10-CM: E78.5  ICD-9-CM: 272.4  12/26/2021 - Present        Edema ICD-10-CM: R60.9  ICD-9-CM: 782.3  12/26/2021 - Present        Type 2 diabetes mellitus, with long-term current use of insulin (HCC) ICD-10-CM: E11.9, Z79.4  ICD-9-CM: 250.00, V58.67  12/26/2021 - Present        CKD (chronic kidney disease) stage 5, GFR less than 15 ml/min (HCC) ICD-10-CM: N18.5  ICD-9-CM: 585.5  12/26/2021 - Present        Anemia ICD-10-CM: D64.9  ICD-9-CM: 285.9  12/26/2021 - Present        Acquired absence of kidney ICD-10-CM: Z90.5  ICD-9-CM: V45.73  12/26/2021 - Present              Medications reviewed  Current Facility-Administered Medications   Medication Dose Route Frequency    furosemide (LASIX) tablet 80 mg  80 mg Oral DAILY    cinacalcet (SENSIPAR) tablet 30 mg  30 mg Oral QHS    hydrALAZINE (APRESOLINE) tablet 50 mg  50 mg Oral TID    epoetin ko-epbx (RETACRIT) injection 10,000 Units  10,000 Units SubCUTAneous Q MON, WED & FRI    glucose chewable tablet 16 g  4 Tablet Oral PRN    dextrose (D50W) injection syrg 12.5-25 g  25-50 mL IntraVENous PRN    glucagon (GLUCAGEN) injection 1 mg  1 mg IntraMUSCular PRN    sodium chloride (NS) flush 5-40 mL  5-40 mL IntraVENous Q8H    sodium chloride (NS) flush 5-40 mL  5-40 mL IntraVENous PRN    insulin glargine (LANTUS) injection 25 Units  25 Units SubCUTAneous QHS    insulin lispro (HUMALOG) injection   SubCUTAneous AC&HS    acetaminophen (TYLENOL) tablet 650 mg  650 mg Oral Q4H PRN    0.9% sodium chloride infusion 250 mL  250 mL IntraVENous PRN    aspirin chewable tablet 81 mg  81 mg Oral DAILY    cetirizine (ZYRTEC) tablet 10 mg  10 mg Oral DAILY    clopidogreL (PLAVIX) tablet 75 mg  75 mg Oral DAILY    metoprolol succinate (TOPROL-XL) XL tablet 50 mg  50 mg Oral DAILY    nitroglycerin (NITROSTAT) tablet 0.4 mg  0.4 mg SubLINGual Q5MIN PRN    ranolazine ER (RANEXA) tablet 500 mg  500 mg Oral BID    atorvastatin (LIPITOR) tablet 40 mg  40 mg Oral QHS    amLODIPine (NORVASC) tablet 10 mg  10 mg Oral DAILY    cholecalciferol (VITAMIN D3) (1000 Units /25 mcg) tablet 1,000 Units  1,000 Units Oral DAILY    isosorbide mononitrate ER (IMDUR) tablet 60 mg  60 mg Oral DAILY    ondansetron (ZOFRAN) injection 4 mg  4 mg IntraVENous Q6H PRN       Review of Systems:   A comprehensive review of systems was negative except for that written in the HPI. Objective:   Physical Exam:     Visit Vitals  BP (!) 142/60 (BP Patient Position: At rest;Semi fowlers)   Pulse 71   Temp 97.6 °F (36.4 °C)   Resp 18   Ht 4' 11\" (1.499 m)   Wt 82.6 kg (182 lb)   SpO2 97%   BMI 36.76 kg/m²    O2 Flow Rate (L/min): 1 l/min O2 Device: None (Room air)    Temp (24hrs), Av.4 °F (36.9 °C), Min:97.6 °F (36.4 °C), Max:99 °F (37.2 °C)    No intake/output data recorded.  1901 -  0700  In: 300 [P.O.:300]  Out: 900 [Urine:900]    General:   Awake and alert   Lungs:    Crackles bilateral.   Chest wall:  No tenderness or deformity. Heart:  Regular rate and rhythm, S1, S2 normal, no murmur, click, rub or gallop. Abdomen:   Soft, non-tender. Bowel sounds normal. No masses,  No organomegaly. Extremities: Extremities normal, atraumatic, no cyanosis 3+ pitting edema. Pulses: 2+ and symmetric all extremities. Skin: Skin color, texture, turgor normal. No rashes or lesions   Neurologic: CNII-XII intact. No gross focal deficits         Data Review:       Recent Days:  Recent Labs     21  0754   WBC 12.3*   HGB 8.2*   HCT 24.8*        Recent Labs     21  0754 21  0400     138  --    K 3.3*  3.2*  --      106  --    CO2   --    GLU 98  102*  --    BUN 65*  64*  --    CREA 5.73*  5.71*  --    CA 10.0  10.4* 10.0   PHOS 3.5  --    ALB 3.1*  --      No results for input(s): PH, PCO2, PO2, HCO3, FIO2 in the last 72 hours.     24 Hour Results:  Recent Results (from the past 24 hour(s))   GLUCOSE, POC    Collection Time: 21 12:21 PM   Result Value Ref Range    Glucose (POC) 148 (H) 65 - 117 mg/dL    Performed by GLORYFRANNIE SATYA    GLUCOSE, POC    Collection Time: 12/29/21  4:02 PM   Result Value Ref Range    Glucose (POC) 114 65 - 117 mg/dL    Performed by ERIN TONG    GLUCOSE, POC    Collection Time: 12/29/21  8:06 PM   Result Value Ref Range    Glucose (POC) 145 (H) 65 - 117 mg/dL    Performed by [id number], POC    Collection Time: 12/30/21  9:03 AM   Result Value Ref Range    Glucose (POC) 104 65 - 117 mg/dL    Performed by Luciana Sheriff        US RETROPERITONEUM COMP   Final Result   Echogenic right kidney consistent with medical renal disease. No   right-sided hydronephrosis. Right renal cysts. Reported left nephrectomy. Assessment:  Acute non-STEMI. Patient to have cardiac catheterization following initiation of hemodialysis    Acute fluid overload, likely due to kidney disease    Chronic kidney disease stage V. To start hemodialysis today    Unilateral kidney with history of nephrectomy for renal cell carcinoma    History of renal artery stenosis of solitary right kidney    Anemia of chronic kidney disease    Coronary artery disease with history of CABG and PCI    Diabetes mellitus type 2    Hypertension    Hyperlipidemia    Peripheral vascular disease with history of left SFA stenosis    Plan:  Start hemodialysis  Cardiac catheterization    Care Plan discussed with: Patient/Family, son at the bedside    Disposition: Continued inpatient care    Total time spent with patient: 30 minutes.     Nisha Whaley MD

## 2021-12-31 LAB
ALBUMIN SERPL-MCNC: 3 G/DL (ref 3.5–5)
ANION GAP SERPL CALC-SCNC: 12 MMOL/L (ref 5–15)
BUN SERPL-MCNC: 80 MG/DL (ref 6–20)
BUN/CREAT SERPL: 11 (ref 12–20)
CA-I BLD-MCNC: 10 MG/DL (ref 8.5–10.1)
CHLORIDE SERPL-SCNC: 95 MMOL/L (ref 97–108)
CO2 SERPL-SCNC: 26 MMOL/L (ref 21–32)
CREAT SERPL-MCNC: 6.96 MG/DL (ref 0.55–1.02)
ERYTHROCYTE [DISTWIDTH] IN BLOOD BY AUTOMATED COUNT: 16.9 % (ref 11.5–14.5)
FERRITIN SERPL-MCNC: 53 NG/ML (ref 26–388)
GLUCOSE BLD STRIP.AUTO-MCNC: 111 MG/DL (ref 65–117)
GLUCOSE BLD STRIP.AUTO-MCNC: 139 MG/DL (ref 65–117)
GLUCOSE SERPL-MCNC: 98 MG/DL (ref 65–100)
HCT VFR BLD AUTO: 25.7 % (ref 35–47)
HGB BLD-MCNC: 8.3 G/DL (ref 11.5–16)
IRON SATN MFR SERPL: 9 % (ref 20–50)
IRON SERPL-MCNC: 28 UG/DL (ref 35–150)
MAGNESIUM SERPL-MCNC: 1.8 MG/DL (ref 1.6–2.4)
MCH RBC QN AUTO: 24.9 PG (ref 26–34)
MCHC RBC AUTO-ENTMCNC: 32.3 G/DL (ref 30–36.5)
MCV RBC AUTO: 77.2 FL (ref 80–99)
NRBC # BLD: 0 K/UL (ref 0–0.01)
NRBC BLD-RTO: 0 PER 100 WBC
PERFORMED BY, TECHID: ABNORMAL
PERFORMED BY, TECHID: NORMAL
PHOSPHATE SERPL-MCNC: 5.3 MG/DL (ref 2.6–4.7)
PLATELET # BLD AUTO: 314 K/UL (ref 150–400)
PMV BLD AUTO: 10.4 FL (ref 8.9–12.9)
POTASSIUM SERPL-SCNC: 3.1 MMOL/L (ref 3.5–5.1)
RBC # BLD AUTO: 3.33 M/UL (ref 3.8–5.2)
SODIUM SERPL-SCNC: 133 MMOL/L (ref 136–145)
TIBC SERPL-MCNC: 309 UG/DL (ref 250–450)
WBC # BLD AUTO: 7.6 K/UL (ref 3.6–11)

## 2021-12-31 PROCEDURE — 74011250636 HC RX REV CODE- 250/636: Performed by: INTERNAL MEDICINE

## 2021-12-31 PROCEDURE — 83735 ASSAY OF MAGNESIUM: CPT

## 2021-12-31 PROCEDURE — 80069 RENAL FUNCTION PANEL: CPT

## 2021-12-31 PROCEDURE — 74011250637 HC RX REV CODE- 250/637: Performed by: INTERNAL MEDICINE

## 2021-12-31 PROCEDURE — 65270000029 HC RM PRIVATE

## 2021-12-31 PROCEDURE — 74011250637 HC RX REV CODE- 250/637: Performed by: HOSPITALIST

## 2021-12-31 PROCEDURE — 36415 COLL VENOUS BLD VENIPUNCTURE: CPT

## 2021-12-31 PROCEDURE — 82962 GLUCOSE BLOOD TEST: CPT

## 2021-12-31 PROCEDURE — 90935 HEMODIALYSIS ONE EVALUATION: CPT

## 2021-12-31 PROCEDURE — 85027 COMPLETE CBC AUTOMATED: CPT

## 2021-12-31 PROCEDURE — 5A1D70Z PERFORMANCE OF URINARY FILTRATION, INTERMITTENT, LESS THAN 6 HOURS PER DAY: ICD-10-PCS | Performed by: INTERNAL MEDICINE

## 2021-12-31 RX ORDER — HEPARIN SODIUM 1000 [USP'U]/ML
INJECTION, SOLUTION INTRAVENOUS; SUBCUTANEOUS
Status: DISPENSED
Start: 2021-12-31 | End: 2021-12-31

## 2021-12-31 RX ORDER — POTASSIUM CHLORIDE 20 MEQ/1
40 TABLET, EXTENDED RELEASE ORAL DAILY
Status: DISCONTINUED | OUTPATIENT
Start: 2021-12-31 | End: 2022-01-08 | Stop reason: HOSPADM

## 2021-12-31 RX ORDER — HEPARIN SODIUM 1000 [USP'U]/ML
2200 INJECTION, SOLUTION INTRAVENOUS; SUBCUTANEOUS
Status: DISCONTINUED | OUTPATIENT
Start: 2021-12-31 | End: 2022-01-06

## 2021-12-31 RX ADMIN — CLOPIDOGREL BISULFATE 75 MG: 75 TABLET ORAL at 12:01

## 2021-12-31 RX ADMIN — CETIRIZINE HYDROCHLORIDE 10 MG: 10 TABLET, FILM COATED ORAL at 12:01

## 2021-12-31 RX ADMIN — EPOETIN ALFA-EPBX 10000 UNITS: 10000 INJECTION, SOLUTION INTRAVENOUS; SUBCUTANEOUS at 12:21

## 2021-12-31 RX ADMIN — RANOLAZINE 500 MG: 500 TABLET, FILM COATED, EXTENDED RELEASE ORAL at 21:59

## 2021-12-31 RX ADMIN — ATORVASTATIN CALCIUM 40 MG: 40 TABLET, FILM COATED ORAL at 21:59

## 2021-12-31 RX ADMIN — HEPARIN SODIUM 2200 UNITS: 1000 INJECTION, SOLUTION INTRAVENOUS; SUBCUTANEOUS at 17:25

## 2021-12-31 RX ADMIN — RANOLAZINE 500 MG: 500 TABLET, FILM COATED, EXTENDED RELEASE ORAL at 12:01

## 2021-12-31 RX ADMIN — Medication 1000 UNITS: at 12:01

## 2021-12-31 RX ADMIN — ASPIRIN 81 MG CHEWABLE TABLET 81 MG: 81 TABLET CHEWABLE at 12:01

## 2021-12-31 RX ADMIN — POTASSIUM CHLORIDE 40 MEQ: 1500 TABLET, EXTENDED RELEASE ORAL at 17:11

## 2021-12-31 RX ADMIN — ONDANSETRON 4 MG: 2 INJECTION INTRAMUSCULAR; INTRAVENOUS at 03:30

## 2021-12-31 RX ADMIN — SODIUM CHLORIDE, PRESERVATIVE FREE 10 ML: 5 INJECTION INTRAVENOUS at 06:00

## 2021-12-31 RX ADMIN — CINACALCET 30 MG: 30 TABLET, FILM COATED ORAL at 21:59

## 2021-12-31 NOTE — CONSULTS
Interventional Radiology Post Procedure Note    12/30/2021    Indications: ESRD    Procedure(s): US-guided dialysis catheter placement (15 cm)    Preliminary Findings (full report to follow): Patent right IJV    Contrast: None    Specimen: None    Implants: See above    Estimated blood loss: Minimal    Complications: None    Plan: Catheter functional; position to be confirmed by chest XR before cleared to use    Follow Up: PRN

## 2021-12-31 NOTE — PROGRESS NOTES
FIRST DIALYSIS TX EVER: Tx initiated via a patent right right IJ. Dressing changed per protocol. No s/s of infection or skin breakdown. Net fluid goal is 2kgs and 3 hours tx time. Hepatitis labs drawn and sent to lab.  No issues verbalized

## 2021-12-31 NOTE — PROGRESS NOTES
Hospitalist Progress Note               Daily Progress Note: 12/31/2021      Subjective: The patient is seen for follow up. Is a 69-year-old female with a history of coronary artery disease with CABG in 2001 and PCI as well as non-STEMI, stage V chronic kidney disease with a unilateral kidney, diabetes, history of nephrectomy for renal cell carcinoma who came to the ED late last night with complaints of shortness of breath and left-sided chest pain. Also with lower extremity edema. In the ED she was hypertensive with otherwise stable vital signs. Labs showed a hemoglobin of 7.1, potassium 2.9, creatinine 5.5 which is her baseline. Initial troponin was 876 with a repeat of 1068. Patient is from [place] and previously received all her care at [facility] but she went to [facility] instead because she did not trust the other hospital    Patient is normally followed by a cardiologist in [place]. Her nephrologist has previously recommended that she start dialysis but patient has refused    Her troponin peaked at 1191    Patient was initially refusing consideration of hemodialysis but after further discussion with nephrology, she is agreeable. Cardiology will plan on cardiac catheterization I believe on Monday    Echo showed an EF of 30 to 35% with mild to moderate mitral insufficiency and mild mitral stenosis. There was a small pericardial effusion    ---------    Patient seen for follow-up. Wants to go home. HD initiated, hypotensive during, bp rx adjusted, nephro appreciated. Denies cp or sob. Spo2 99% room air. Cardio input noted, cardiac cath anticipated Monday anticipating improved volume status. No new problems.         Problem List:  Problem List as of 12/31/2021 Date Reviewed: 12/26/2021          Codes Class Noted - Resolved    * (Principal) NSTEMI (non-ST elevated myocardial infarction) ([facility] [address] ICD-10-CM: I21.4  ICD-9-CM: 410.70  12/26/2021 - Present        HTN (hypertension) ICD-10-CM: I10  ICD-9-CM: 401.9  12/26/2021 - Present        HLD (hyperlipidemia) ICD-10-CM: E78.5  ICD-9-CM: 272.4  12/26/2021 - Present        Edema ICD-10-CM: R60.9  ICD-9-CM: 782.3  12/26/2021 - Present        Type 2 diabetes mellitus, with long-term current use of insulin (HCC) ICD-10-CM: E11.9, Z79.4  ICD-9-CM: 250.00, V58.67  12/26/2021 - Present        CKD (chronic kidney disease) stage 5, GFR less than 15 ml/min (HCC) ICD-10-CM: N18.5  ICD-9-CM: 585.5  12/26/2021 - Present        Anemia ICD-10-CM: D64.9  ICD-9-CM: 285.9  12/26/2021 - Present        Acquired absence of kidney ICD-10-CM: Z90.5  ICD-9-CM: V45.73  12/26/2021 - Present              Medications reviewed  Current Facility-Administered Medications   Medication Dose Route Frequency    heparin (porcine) 1,000 unit/mL injection        potassium chloride (K-DUR, KLOR-CON M20) SR tablet 40 mEq  40 mEq Oral DAILY    cinacalcet (SENSIPAR) tablet 30 mg  30 mg Oral QHS    epoetin ko-epbx (RETACRIT) injection 10,000 Units  10,000 Units SubCUTAneous Q MON, WED & FRI    glucose chewable tablet 16 g  4 Tablet Oral PRN    dextrose (D50W) injection syrg 12.5-25 g  25-50 mL IntraVENous PRN    glucagon (GLUCAGEN) injection 1 mg  1 mg IntraMUSCular PRN    sodium chloride (NS) flush 5-40 mL  5-40 mL IntraVENous Q8H    sodium chloride (NS) flush 5-40 mL  5-40 mL IntraVENous PRN    insulin glargine (LANTUS) injection 25 Units  25 Units SubCUTAneous QHS    insulin lispro (HUMALOG) injection   SubCUTAneous AC&HS    acetaminophen (TYLENOL) tablet 650 mg  650 mg Oral Q4H PRN    0.9% sodium chloride infusion 250 mL  250 mL IntraVENous PRN    aspirin chewable tablet 81 mg  81 mg Oral DAILY    cetirizine (ZYRTEC) tablet 10 mg  10 mg Oral DAILY    clopidogreL (PLAVIX) tablet 75 mg  75 mg Oral DAILY    metoprolol succinate (TOPROL-XL) XL tablet 50 mg  50 mg Oral DAILY    nitroglycerin (NITROSTAT) tablet 0.4 mg  0.4 mg SubLINGual Q5MIN PRN    ranolazine ER (RANEXA) tablet 500 mg  500 mg Oral BID    atorvastatin (LIPITOR) tablet 40 mg  40 mg Oral QHS    cholecalciferol (VITAMIN D3) (1000 Units /25 mcg) tablet 1,000 Units  1,000 Units Oral DAILY    ondansetron (ZOFRAN) injection 4 mg  4 mg IntraVENous Q6H PRN       Review of Systems:   A comprehensive review of systems was negative except for that written in the HPI. Objective:   Physical Exam:     Visit Vitals  /65 (BP Patient Position: At rest;Lying)   Pulse 75   Temp 98.2 °F (36.8 °C)   Resp 20   Ht 4' 11\" (1.499 m)   Wt 81.2 kg (179 lb 0.2 oz)   SpO2 99%   BMI 36.16 kg/m²    O2 Flow Rate (L/min): 1 l/min O2 Device: None (Room air)    Temp (24hrs), Av.3 °F (36.8 °C), Min:98 °F (36.7 °C), Max:99.5 °F (37.5 °C)    701 - 1900  In: -   Out: 1000    1901 - 700  In: 300 [P.O.:300]  Out: 500 [Urine:500]    General:   Awake and alert   Lungs:    Crackles bilateral.   Chest wall:  No tenderness or deformity. Heart:  Regular rate and rhythm, S1, S2 normal, no murmur, click, rub or gallop. Abdomen:   Soft, non-tender. Bowel sounds normal. No masses,  No organomegaly. Extremities: Extremities normal, atraumatic, no cyanosis 3+ pitting edema. Pulses: 2+ and symmetric all extremities. Skin: Skin color, texture, turgor normal. No rashes or lesions   Neurologic: CNII-XII intact. No gross focal deficits         Data Review:       Recent Days:  Recent Labs     21  0830   WBC 7.6   HGB 8.3*   HCT 25.7*        Recent Labs     21  0830 21  1057   * 131*   K 3.1* 3.7   CL 95* 96*   CO2 26 24   GLU 98 113*   BUN 80* 72*   CREA 6.96* 6.45*   CA 10.0 9.8   PHOS 5.3* 4.6   ALB 3.0* 3.2*     No results for input(s): PH, PCO2, PO2, HCO3, FIO2 in the last 72 hours.     24 Hour Results:  Recent Results (from the past 24 hour(s))   GLUCOSE, POC    Collection Time: 21  5:17 PM   Result Value Ref Range    Glucose (POC) 100 65 - 117 mg/dL    Performed by Lucien Fruit RADHA    CBC W/O DIFF    Collection Time: 12/31/21  8:30 AM   Result Value Ref Range    WBC 7.6 3.6 - 11.0 K/uL    RBC 3.33 (L) 3.80 - 5.20 M/uL    HGB 8.3 (L) 11.5 - 16.0 g/dL    HCT 25.7 (L) 35.0 - 47.0 %    MCV 77.2 (L) 80.0 - 99.0 FL    MCH 24.9 (L) 26.0 - 34.0 PG    MCHC 32.3 30.0 - 36.5 g/dL    RDW 16.9 (H) 11.5 - 14.5 %    PLATELET 856 850 - 004 K/uL    MPV 10.4 8.9 - 12.9 FL    NRBC 0.0 0.0  WBC    ABSOLUTE NRBC 0.00 0.00 - 0.01 K/uL   RENAL FUNCTION PANEL    Collection Time: 12/31/21  8:30 AM   Result Value Ref Range    Sodium 133 (L) 136 - 145 mmol/L    Potassium 3.1 (L) 3.5 - 5.1 mmol/L    Chloride 95 (L) 97 - 108 mmol/L    CO2 26 21 - 32 mmol/L    Anion gap 12 5 - 15 mmol/L    Glucose 98 65 - 100 mg/dL    BUN 80 (H) 6 - 20 mg/dL    Creatinine 6.96 (H) 0.55 - 1.02 mg/dL    BUN/Creatinine ratio 11 (L) 12 - 20      GFR est AA 7 (L) >60 ml/min/1.73m2    GFR est non-AA 6 (L) >60 ml/min/1.73m2    Calcium 10.0 8.5 - 10.1 mg/dL    Phosphorus 5.3 (H) 2.6 - 4.7 mg/dL    Albumin 3.0 (L) 3.5 - 5.0 g/dL   GLUCOSE, POC    Collection Time: 12/31/21 11:20 AM   Result Value Ref Range    Glucose (POC) 111 65 - 117 mg/dL    Performed by MEGAN RAM CHEST PORT   Final Result   FINDINGS: IMPRESSION: Single frontal view of the chest. Interval placement right   dialysis catheter distal tip SVC/RA junction region. No pneumothorax. Median sternotomy status post CABG. Coronary artery stenting. Unchanged mild   cardiomegaly. No vascular congestion or pulmonary edema. The lungs are well inflated. No infiltrate, pleural effusion, lobar   consolidation. US RETROPERITONEUM COMP   Final Result   Echogenic right kidney consistent with medical renal disease. No   right-sided hydronephrosis. Right renal cysts. Reported left nephrectomy. Assessment:  Acute non-STEMI.   Patient to have cardiac catheterization following initiation of hemodialysis- anticipate Monday with Dr Nathanael Mireles if volume status improved. Acute on chronic HFrEF    Chronic kidney disease stage V.  Started hemodialysis today    Unilateral kidney with history of nephrectomy for renal cell carcinoma    History of renal artery stenosis of solitary right kidney    Anemia of chronic kidney disease    Coronary artery disease with history of CABG and PCI    Diabetes mellitus type 2    Hypertension: Hypotension with HD. 12/31:-Discontinue amlodipine Imdur and hydralazine. continue metoprolol 25 mg twice a day. Hyperlipidemia    Peripheral vascular disease with history of left SFA stenosis    Hypokalemia    Plan:  HD per nephrology  Cardiac cath anticipated Monday. Cx input noted, continue to follow recommendations. Stopped  amlodipine Imdur and hydralazine &continue metoprolol 25 mg twice a day. Stopped metolazone, lasix  Follow mag. Kcl 40 meq today x 1, follow am bmp. Care Plan discussed with: Patient/Family, son at the bedside    Disposition: Continued inpatient care, hd initiated, cm working on chair in [place]. Cardiac cath anticipated Monday. Total time spent with patient: 30 minutes.     Yaakov Solano MD

## 2021-12-31 NOTE — PROGRESS NOTES
Patient: Jony Cerda MRN: [id number]     YOB: 1950  Age: 66 y.o. Sex: female      Consult requested by: Magdalena Tinoco MD    Subjective: The patient is seen in the room. Repeat BUN/creatinine 80/6. 96.  K was 3.1  She had HD cath placed on 12/30  She had her first dialysis session today and tolerated well    Past Medical History:  Past Medical History:   Diagnosis Date    CAD (coronary artery disease)     Diabetes ([facility]     Heart failure ([facility]     Hypertension        Past Surgical History:  Past Surgical History:   Procedure Laterality Date    PA CARDIAC SURG PROCEDURE UNLIST         Family History:  Family History   Problem Relation Age of Onset    Hypertension Father        Social History:  Social History     Socioeconomic History    Marital status:      Spouse name: Not on file    Number of children: Not on file    Years of education: Not on file    Highest education level: Not on file   Occupational History    Not on file   Tobacco Use    Smoking status: Former Smoker    Smokeless tobacco: Former User     Quit date: 1/1/2018   Vaping Use    Vaping Use: Never used   Substance and Sexual Activity    Alcohol use: Not Currently    Drug use: Never    Sexual activity: Not on file   Other Topics Concern     Service Not Asked    Blood Transfusions Not Asked    Caffeine Concern Not Asked    Occupational Exposure Not Asked    Hobby Hazards Not Asked    Sleep Concern Not Asked    Stress Concern Not Asked    Weight Concern Not Asked    Special Diet Not Asked    Back Care Not Asked    Exercise Not Asked    Bike Helmet Not Asked   [address] Not Asked    Self-Exams Not Asked   Social History Narrative    Not on file     Social Determinants of Health     Financial Resource Strain:     Difficulty of Paying Living Expenses: Not on file   Food Insecurity:     Worried About Running Out of Food in the Last Year: Not on file    Ran Out of Food in the Last Year: Not on file   Transportation Needs:     Lack of Transportation (Medical): Not on file    Lack of Transportation (Non-Medical): Not on file   Physical Activity:     Days of Exercise per Week: Not on file    Minutes of Exercise per Session: Not on file   Stress:     Feeling of Stress : Not on file   Social Connections:     Frequency of Communication with Friends and Family: Not on file    Frequency of Social Gatherings with Friends and Family: Not on file    Attends [employer] Services: Not on file    Active Member of [address] [place] [address] [employer] or Organizations: Not on file    Attends Club or Organization Meetings: Not on file    Marital Status: Not on file   Intimate Partner Violence:     Fear of Current or Ex-Partner: Not on file    Emotionally Abused: Not on file    Physically Abused: Not on file    Sexually Abused: Not on file   Housing Stability:     Unable to Pay for Housing in the Last Year: Not on file    Number of [place] in the Last Year: Not on file    Unstable Housing in the Last Year: Not on file       Allergies   Allergen Reactions    Penicillin G Hives       Review of Systems:  No fever or chills. No sore throat. No cough or hemoptysis. + shortness of breath &  chest pain. +  orthopnea     No nausea, vomiting, abdominal pain, melena or hematochezia.    + B/L LE swelling, no joint paints. No muscle aches. No skin changes. No dizziness or lightheadedness. No headaches.      Objective:     Vitals:    12/31/21 1030 12/31/21 1100 12/31/21 1115 12/31/21 1234   BP: 112/62 114/78 116/71 (!) 143/69   Pulse:    78   Resp:    20   Temp:    98.1 °F (36.7 °C)   TempSrc:       SpO2:    98%   Weight:       Height:            Intake and Output:  Current Shift: 12/31 0701 - 12/31 1900  In: -   Out: 1000   Last three shifts: 12/29 1901 - 12/31 0700  In: 300 [P.O.:300]  Out: 500 [Urine:500]    Physical Exam:   GENERAL: alert, cooperative, no distress, appears stated age  EYE: negative  THROAT & NECK: normal, no erythema or exudates noted. , and JVD  LUNG: clear to auscultation bilaterally, RT LE lower 1/3 fine rales,  Left side clear   HEART: regular rate and rhythm, S1, S2 normal, no murmur, click, rub or gallop  ABDOMEN: soft, non-tender. Bowel sounds normal. No masses,  no organomegaly   - No fallon  EXTREMITIES:  2+ pitting edema  SKIN: Normal. and no rash or abnormalities  NEUROLOGIC: negative , no gross neuro deficits. PSYCHIATRIC: non focal    Data Review    Recent Results (from the past 24 hour(s))   GLUCOSE, POC    Collection Time: 12/30/21  5:17 PM   Result Value Ref Range    Glucose (POC) 100 65 - 117 mg/dL    Performed by CHRIS GARCIA    CBC W/O DIFF    Collection Time: 12/31/21  8:30 AM   Result Value Ref Range    WBC 7.6 3.6 - 11.0 K/uL    RBC 3.33 (L) 3.80 - 5.20 M/uL    HGB 8.3 (L) 11.5 - 16.0 g/dL    HCT 25.7 (L) 35.0 - 47.0 %    MCV 77.2 (L) 80.0 - 99.0 FL    MCH 24.9 (L) 26.0 - 34.0 PG    MCHC 32.3 30.0 - 36.5 g/dL    RDW 16.9 (H) 11.5 - 14.5 %    PLATELET 125 378 - 897 K/uL    MPV 10.4 8.9 - 12.9 FL    NRBC 0.0 0.0  WBC    ABSOLUTE NRBC 0.00 0.00 - 0.01 K/uL   RENAL FUNCTION PANEL    Collection Time: 12/31/21  8:30 AM   Result Value Ref Range    Sodium 133 (L) 136 - 145 mmol/L    Potassium 3.1 (L) 3.5 - 5.1 mmol/L    Chloride 95 (L) 97 - 108 mmol/L    CO2 26 21 - 32 mmol/L    Anion gap 12 5 - 15 mmol/L    Glucose 98 65 - 100 mg/dL    BUN 80 (H) 6 - 20 mg/dL    Creatinine 6.96 (H) 0.55 - 1.02 mg/dL    BUN/Creatinine ratio 11 (L) 12 - 20      GFR est AA 7 (L) >60 ml/min/1.73m2    GFR est non-AA 6 (L) >60 ml/min/1.73m2    Calcium 10.0 8.5 - 10.1 mg/dL    Phosphorus 5.3 (H) 2.6 - 4.7 mg/dL    Albumin 3.0 (L) 3.5 - 5.0 g/dL   GLUCOSE, POC    Collection Time: 12/31/21 11:20 AM   Result Value Ref Range    Glucose (POC) 111 65 - 117 mg/dL    Performed by [employer]           Imaging -     1. Cxray - PROCEDURE: XR CHEST PORT     HISTORY:Short of breath     COMPARISON:None        TECHNIQUE: AP portable chest     LIMITATIONS: None     TUBES/LINES: None     LUNG PARENCHYMA/PLEURA: Calcified nodule in the right lung base. Mildly  increased interstitial markings bilaterally. No pleural effusion although there  is some subpleural edema  TRACHEA/BRONCHI: Normal  PULMONARY VESSELS: Mild cephalization of the vasculature  HEART: Mild cardiomegaly. There is evidence of previous cardiac surgery and  stent placement. MEDIASTINUM: Normal  BONE/SOFT TISSUES: No acute process     OTHER: None     IMPRESSION  Mild cardiomegaly with mild changes of cardiac decompensation in the  lungs. No florid CHF. .        EKG - Normal sinus rhythm   Marked ST abnormality, possible lateral subendocardial injury   Abnormal ECG   When compared with ECG of 25-DEC-2021 22:51,   PREVIOUS ECG IS PRESENT   Confirmed by PACO MACARIO, Marika Weber ([id number]) on 12/26/2021 12:16:42 PM     Assessment & Plan:     Hospital Problems  Date Reviewed: 12/26/2021          Codes Class Noted POA    * (Principal) NSTEMI (non-ST elevated myocardial infarction) ([facility] ICD-10-CM: I21.4  ICD-9-CM: 410.70  12/26/2021 Yes        HTN (hypertension) ICD-10-CM: I10  ICD-9-CM: 401.9  12/26/2021 Yes        HLD (hyperlipidemia) ICD-10-CM: E78.5  ICD-9-CM: 272.4  12/26/2021 Yes        Edema ICD-10-CM: R60.9  ICD-9-CM: 782.3  12/26/2021 Yes        Type 2 diabetes mellitus, with long-term current use of insulin (HCC) ICD-10-CM: E11.9, Z79.4  ICD-9-CM: 250.00, V58.67  12/26/2021 Yes        CKD (chronic kidney disease) stage 5, GFR less than 15 ml/min (HCC) ICD-10-CM: N18.5  ICD-9-CM: 585.5  12/26/2021 Yes        Anemia ICD-10-CM: D64.9  ICD-9-CM: 285.9  12/26/2021 Yes        Acquired absence of kidney ICD-10-CM: Z90.5  ICD-9-CM: V45.73  12/26/2021 Unknown            1- JIM on possibly advanced CKD stage unknown:   -Since admission Cr 5.7->6.9 and BUN 80  -Unknown baseline.   -she probably had advanced CKD due to which her nephrologist [place] had discussed about starting dialysis with her.  -Renal US showed No right-sided hydronephrosis. s/p left nephrectomy.  -Clinically volume overload, no uremia  -Plan to place a temporary dialysis catheter which we can eventually exchange to permanent catheter.   -I have already spoken with the  to set up her dialysis chair close to her home in [place].  -She had her first dialysis today. 2-Hypokalemia:  -from loops and thiazides  -will DC metolazone and lasix  -will resume KCL 40 meq daily as K 3.1. [employer] -will use 3K bath. 3-Anemia:  -likely due to CKD. -Received 1 unit of PRBC in the ED. -pending iron studies  -started on Epo iv MWF.    4-NSTEMI   -started on heparin drip.  -Echo LVEF 30-35%  -Cardio can proceed for CC now. 5-Hypertension:  -blood pressure currently better controlled. -Patient became hypotensive during dialysis  -Discontinue amlodipine Imdur and hydralazine.  -I will continue metoprolol 25 mg twice a day.     6-CHF:  -Decompensated and fluid overload due to #1  -LVEF 30 to 35%  -will dc metolazone  -will dc lasix    7-Renal osteodystrophy:  -Ca 10.4, Phos 3.5  - iPTH 236 and Vit D 50.1  -likely primary HPT  -started on sensipar 30 mg daily    Signed By: Ramona Pyle MD     December 31, 2021

## 2021-12-31 NOTE — PROGRESS NOTES
Cardiology Progress Note      12/31/2021 2:49 PM    Admit Date: 12/26/2021    Admit Diagnosis: NSTEMI (non-ST elevated myocardial infarction) ([facility] [I21.4]      Subjective:   Patient seen and examined; she is chest pain free; dialysis to be initiated later today.     Visit Vitals  BP (!) 143/69 (BP Patient Position: At rest;Lying right side)   Pulse 78   Temp 98.1 °F (36.7 °C)   Resp 20   Ht 4' 11\" (1.499 m)   Wt 81.2 kg (179 lb 0.2 oz)   SpO2 98%   BMI 36.16 kg/m²     Current Facility-Administered Medications   Medication Dose Route Frequency    heparin (porcine) 1,000 unit/mL injection        furosemide (LASIX) tablet 80 mg  80 mg Oral DAILY    cinacalcet (SENSIPAR) tablet 30 mg  30 mg Oral QHS    hydrALAZINE (APRESOLINE) tablet 50 mg  50 mg Oral TID    epoetin ko-epbx (RETACRIT) injection 10,000 Units  10,000 Units SubCUTAneous Q MON, WED & FRI    glucose chewable tablet 16 g  4 Tablet Oral PRN    dextrose (D50W) injection syrg 12.5-25 g  25-50 mL IntraVENous PRN    glucagon (GLUCAGEN) injection 1 mg  1 mg IntraMUSCular PRN    sodium chloride (NS) flush 5-40 mL  5-40 mL IntraVENous Q8H    sodium chloride (NS) flush 5-40 mL  5-40 mL IntraVENous PRN    insulin glargine (LANTUS) injection 25 Units  25 Units SubCUTAneous QHS    insulin lispro (HUMALOG) injection   SubCUTAneous AC&HS    acetaminophen (TYLENOL) tablet 650 mg  650 mg Oral Q4H PRN    0.9% sodium chloride infusion 250 mL  250 mL IntraVENous PRN    aspirin chewable tablet 81 mg  81 mg Oral DAILY    cetirizine (ZYRTEC) tablet 10 mg  10 mg Oral DAILY    clopidogreL (PLAVIX) tablet 75 mg  75 mg Oral DAILY    metoprolol succinate (TOPROL-XL) XL tablet 50 mg  50 mg Oral DAILY    nitroglycerin (NITROSTAT) tablet 0.4 mg  0.4 mg SubLINGual Q5MIN PRN    ranolazine ER (RANEXA) tablet 500 mg  500 mg Oral BID    atorvastatin (LIPITOR) tablet 40 mg  40 mg Oral QHS    amLODIPine (NORVASC) tablet 10 mg  10 mg Oral DAILY    cholecalciferol (VITAMIN D3) (1000 Units /25 mcg) tablet 1,000 Units  1,000 Units Oral DAILY    isosorbide mononitrate ER (IMDUR) tablet 60 mg  60 mg Oral DAILY    ondansetron (ZOFRAN) injection 4 mg  4 mg IntraVENous Q6H PRN         Objective:      Physical Exam:  Visit Vitals  BP (!) 143/69 (BP Patient Position: At rest;Lying right side)   Pulse 78   Temp 98.1 °F (36.7 °C)   Resp 20   Ht 4' 11\" (1.499 m)   Wt 81.2 kg (179 lb 0.2 oz)   SpO2 98%   BMI 36.16 kg/m²     General Appearance:  Well developed, well nourished,alert and oriented x 3, and individual in no acute distress. Ears/Nose/Mouth/Throat:   Hearing grossly normal.         Neck: Supple. Chest:   Lungs clear to auscultation bilaterally. Cardiovascular:  Regular rate and rhythm, S1, S2 normal, no murmur. Abdomen:   Soft, non-tender, bowel sounds are active. Extremities: No edema bilaterally. Skin: Warm and dry.                Data Review:   Labs:    Recent Results (from the past 24 hour(s))   GLUCOSE, POC    Collection Time: 12/30/21  5:17 PM   Result Value Ref Range    Glucose (POC) 100 65 - 117 mg/dL    Performed by CHRIS GARCIA    CBC W/O DIFF    Collection Time: 12/31/21  8:30 AM   Result Value Ref Range    WBC 7.6 3.6 - 11.0 K/uL    RBC 3.33 (L) 3.80 - 5.20 M/uL    HGB 8.3 (L) 11.5 - 16.0 g/dL    HCT 25.7 (L) 35.0 - 47.0 %    MCV 77.2 (L) 80.0 - 99.0 FL    MCH 24.9 (L) 26.0 - 34.0 PG    MCHC 32.3 30.0 - 36.5 g/dL    RDW 16.9 (H) 11.5 - 14.5 %    PLATELET 061 490 - 720 K/uL    MPV 10.4 8.9 - 12.9 FL    NRBC 0.0 0.0  WBC    ABSOLUTE NRBC 0.00 0.00 - 0.01 K/uL   RENAL FUNCTION PANEL    Collection Time: 12/31/21  8:30 AM   Result Value Ref Range    Sodium 133 (L) 136 - 145 mmol/L    Potassium 3.1 (L) 3.5 - 5.1 mmol/L    Chloride 95 (L) 97 - 108 mmol/L    CO2 26 21 - 32 mmol/L    Anion gap 12 5 - 15 mmol/L    Glucose 98 65 - 100 mg/dL    BUN 80 (H) 6 - 20 mg/dL    Creatinine 6.96 (H) 0.55 - 1.02 mg/dL    BUN/Creatinine ratio 11 (L) 12 - 20      GFR est AA 7 (L) >60 ml/min/1.73m2    GFR est non-AA 6 (L) >60 ml/min/1.73m2    Calcium 10.0 8.5 - 10.1 mg/dL    Phosphorus 5.3 (H) 2.6 - 4.7 mg/dL    Albumin 3.0 (L) 3.5 - 5.0 g/dL   GLUCOSE, POC    Collection Time: 12/31/21 11:20 AM   Result Value Ref Range    Glucose (POC) 111 65 - 117 mg/dL    Performed by Bradley Escobedo        Telemetry: normal sinus rhythm      Assessment:   NSTEMI  Acute on chronic HFrEF  ESRD on HD  S/p nephrectomy  Diabetes Mellitus  Hypertension  Hyperlipidemia    Plan:   -Plans for hemodialysis later today; this will improve her volume status and hemodynamics  -Discussed risks/benefits/alternates to cardiac catheterization; patient agrees to proceed; there is significant stress around her current ailments as well which I counseled her regarding  -Based on her clinical course if her volume status improves will tentatively plan for cardiac cath on Monday with Dr. Kel Hanson  -We will follow

## 2022-01-01 LAB
ALBUMIN SERPL-MCNC: 3.3 G/DL (ref 3.5–5)
ANION GAP SERPL CALC-SCNC: 12 MMOL/L (ref 5–15)
BUN SERPL-MCNC: 57 MG/DL (ref 6–20)
BUN/CREAT SERPL: 11 (ref 12–20)
CA-I BLD-MCNC: 7.4 MG/DL (ref 8.5–10.1)
CHLORIDE SERPL-SCNC: 96 MMOL/L (ref 97–108)
CO2 SERPL-SCNC: 22 MMOL/L (ref 21–32)
CREAT SERPL-MCNC: 5.25 MG/DL (ref 0.55–1.02)
GLUCOSE BLD STRIP.AUTO-MCNC: 113 MG/DL (ref 65–117)
GLUCOSE BLD STRIP.AUTO-MCNC: 133 MG/DL (ref 65–117)
GLUCOSE BLD STRIP.AUTO-MCNC: 134 MG/DL (ref 65–117)
GLUCOSE BLD STRIP.AUTO-MCNC: 153 MG/DL (ref 65–117)
GLUCOSE SERPL-MCNC: 132 MG/DL (ref 65–100)
PERFORMED BY, TECHID: ABNORMAL
PERFORMED BY, TECHID: NORMAL
PHOSPHATE SERPL-MCNC: 2.3 MG/DL (ref 2.6–4.7)
POTASSIUM SERPL-SCNC: 3.5 MMOL/L (ref 3.5–5.1)
SODIUM SERPL-SCNC: 130 MMOL/L (ref 136–145)

## 2022-01-01 PROCEDURE — 82962 GLUCOSE BLOOD TEST: CPT

## 2022-01-01 PROCEDURE — 80069 RENAL FUNCTION PANEL: CPT

## 2022-01-01 PROCEDURE — 65270000029 HC RM PRIVATE

## 2022-01-01 PROCEDURE — 74011250637 HC RX REV CODE- 250/637: Performed by: HOSPITALIST

## 2022-01-01 PROCEDURE — 36415 COLL VENOUS BLD VENIPUNCTURE: CPT

## 2022-01-01 PROCEDURE — 74011250637 HC RX REV CODE- 250/637: Performed by: INTERNAL MEDICINE

## 2022-01-01 PROCEDURE — 74011636637 HC RX REV CODE- 636/637: Performed by: HOSPITALIST

## 2022-01-01 RX ADMIN — ATORVASTATIN CALCIUM 40 MG: 40 TABLET, FILM COATED ORAL at 21:10

## 2022-01-01 RX ADMIN — INSULIN GLARGINE 25 UNITS: 100 INJECTION, SOLUTION SUBCUTANEOUS at 21:11

## 2022-01-01 RX ADMIN — CLOPIDOGREL BISULFATE 75 MG: 75 TABLET ORAL at 09:38

## 2022-01-01 RX ADMIN — RANOLAZINE 500 MG: 500 TABLET, FILM COATED, EXTENDED RELEASE ORAL at 21:11

## 2022-01-01 RX ADMIN — RANOLAZINE 500 MG: 500 TABLET, FILM COATED, EXTENDED RELEASE ORAL at 09:38

## 2022-01-01 RX ADMIN — Medication 1000 UNITS: at 09:38

## 2022-01-01 RX ADMIN — ASPIRIN 81 MG CHEWABLE TABLET 81 MG: 81 TABLET CHEWABLE at 09:38

## 2022-01-01 RX ADMIN — METOPROLOL SUCCINATE 50 MG: 50 TABLET, EXTENDED RELEASE ORAL at 09:38

## 2022-01-01 RX ADMIN — CETIRIZINE HYDROCHLORIDE 10 MG: 10 TABLET, FILM COATED ORAL at 09:38

## 2022-01-01 RX ADMIN — CINACALCET 30 MG: 30 TABLET, FILM COATED ORAL at 21:10

## 2022-01-01 RX ADMIN — SODIUM CHLORIDE, PRESERVATIVE FREE 10 ML: 5 INJECTION INTRAVENOUS at 21:10

## 2022-01-01 RX ADMIN — POTASSIUM CHLORIDE 40 MEQ: 1500 TABLET, EXTENDED RELEASE ORAL at 09:38

## 2022-01-01 NOTE — PROGRESS NOTES
Hospitalist Progress Note               Daily Progress Note: 1/1/2022      Subjective: The patient is seen for follow up. Is a 65-year-old female with a history of coronary artery disease with CABG in 2001 and PCI as well as non-STEMI, stage V chronic kidney disease with a unilateral kidney, diabetes, history of nephrectomy for renal cell carcinoma who came to the ED late last night with complaints of shortness of breath and left-sided chest pain. Also with lower extremity edema. In the ED she was hypertensive with otherwise stable vital signs. Labs showed a hemoglobin of 7.1, potassium 2.9, creatinine 5.5 which is her baseline. Initial troponin was 876 with a repeat of 1068. Patient is from [address] [place] [address] and previously received all her [facility] at [facility] but she went to [facility] instead because she did not trust the other hospital    Patient is normally followed by a cardiologist in [address] [place] [address]. Her nephrologist has previously recommended that she start dialysis but patient has refused    Her troponin peaked at 1191    Patient was initially refusing consideration of hemodialysis but after further discussion with nephrology, she is agreeable. Cardiology will plan on cardiac catheterization I believe on Monday    Echo showed an EF of 30 to 35% with mild to moderate mitral insufficiency and mild mitral stenosis. There was a small pericardial effusion    ---------    Patient seen for follow-up. No acute events. Agreeable for cath on Monday. 12/31  HD initiated, hypotensive during, bp rx adjusted, nephro appreciated. Denies cp or sob. Spo2 99% room air. Cardio input noted, cardiac cath anticipated Monday anticipating improved volume status. No new problems.         Problem List:  Problem List as of 1/1/2022 Date Reviewed: 12/26/2021          Codes Class Noted - Resolved    * (Principal) NSTEMI (non-ST elevated myocardial infarction) ([facility] ICD-10-CM: I21.4  ICD-9-CM: 410.70  12/26/2021 - Present        HTN (hypertension) ICD-10-CM: I10  ICD-9-CM: 401.9  12/26/2021 - Present        HLD (hyperlipidemia) ICD-10-CM: E78.5  ICD-9-CM: 272.4  12/26/2021 - Present        Edema ICD-10-CM: R60.9  ICD-9-CM: 782.3  12/26/2021 - Present        Type 2 diabetes mellitus, with long-term current use of insulin (HCC) ICD-10-CM: E11.9, Z79.4  ICD-9-CM: 250.00, V58.67  12/26/2021 - Present        CKD (chronic kidney disease) stage 5, GFR less than 15 ml/min (HCC) ICD-10-CM: N18.5  ICD-9-CM: 585.5  12/26/2021 - Present        Anemia ICD-10-CM: D64.9  ICD-9-CM: 285.9  12/26/2021 - Present        Acquired absence of kidney ICD-10-CM: Z90.5  ICD-9-CM: V45.73  12/26/2021 - Present              Medications reviewed  Current Facility-Administered Medications   Medication Dose Route Frequency    potassium chloride (K-DUR, KLOR-CON M20) SR tablet 40 mEq  40 mEq Oral DAILY    heparin (porcine) 1,000 unit/mL injection 2,200 Units  2,200 Units Hemodialysis DIALYSIS PRN    cinacalcet (SENSIPAR) tablet 30 mg  30 mg Oral QHS    epoetin ko-epbx (RETACRIT) injection 10,000 Units  10,000 Units SubCUTAneous Q MON, WED & FRI    glucose chewable tablet 16 g  4 Tablet Oral PRN    dextrose (D50W) injection syrg 12.5-25 g  25-50 mL IntraVENous PRN    glucagon (GLUCAGEN) injection 1 mg  1 mg IntraMUSCular PRN    sodium chloride (NS) flush 5-40 mL  5-40 mL IntraVENous Q8H    sodium chloride (NS) flush 5-40 mL  5-40 mL IntraVENous PRN    insulin glargine (LANTUS) injection 25 Units  25 Units SubCUTAneous QHS    insulin lispro (HUMALOG) injection   SubCUTAneous AC&HS    acetaminophen (TYLENOL) tablet 650 mg  650 mg Oral Q4H PRN    0.9% sodium chloride infusion 250 mL  250 mL IntraVENous PRN    aspirin chewable tablet 81 mg  81 mg Oral DAILY    cetirizine (ZYRTEC) tablet 10 mg  10 mg Oral DAILY    clopidogreL (PLAVIX) tablet 75 mg  75 mg Oral DAILY    metoprolol succinate (TOPROL-XL) XL tablet 50 mg  50 mg Oral DAILY    nitroglycerin (NITROSTAT) tablet 0.4 mg  0.4 mg SubLINGual Q5MIN PRN    ranolazine ER (RANEXA) tablet 500 mg  500 mg Oral BID    atorvastatin (LIPITOR) tablet 40 mg  40 mg Oral QHS    cholecalciferol (VITAMIN D3) (1000 Units /25 mcg) tablet 1,000 Units  1,000 Units Oral DAILY    ondansetron (ZOFRAN) injection 4 mg  4 mg IntraVENous Q6H PRN       Review of Systems:   A comprehensive review of systems was negative except for that written in the HPI. Objective:   Physical Exam:     Visit Vitals  /65 (BP 1 Location: Left upper arm, BP Patient Position: At rest;Supine)   Pulse 86   Temp 99.1 °F (37.3 °C)   Resp 18   Ht 4' 11\" (1.499 m)   Wt 81.2 kg (179 lb 0.2 oz)   SpO2 100%   BMI 36.16 kg/m²    O2 Flow Rate (L/min): 1 l/min O2 Device: None (Room air)    Temp (24hrs), Av °F (37.2 °C), Min:98.5 °F (36.9 °C), Max:99.6 °F (37.6 °C)    No intake/output data recorded.  1901 -  0700  In: -   Out: 1000     General:   Awake and alert   Lungs:    Crackles bilateral.   Chest wall:  No tenderness or deformity. Heart:  Regular rate and rhythm, S1, S2 normal, no murmur, click, rub or gallop. Abdomen:   Soft, non-tender. Bowel sounds normal. No masses,  No organomegaly. Extremities: Extremities normal, atraumatic, no cyanosis 3+ pitting edema. Pulses: 2+ and symmetric all extremities. Skin: Skin color, texture, turgor normal. No rashes or lesions   Neurologic: CNII-XII intact.   No gross focal deficits         Data Review:       Recent Days:  Recent Labs     21  0830   WBC 7.6   HGB 8.3*   HCT 25.7*        Recent Labs     22  0834 21  1626 21  0830 21  1057   *  --  133* 131*   K 3.5  --  3.1* 3.7   CL 96*  --  95* 96*   CO2 22  --  26 24   *  --  98 113*   BUN 57*  --  80* 72*   CREA 5.25*  --  6.96* 6.45*   CA 7.4*  --  10.0 9.8   MG  --  1.8  --   --    PHOS 2.3*  --  5.3* 4.6   ALB 3.3*  --  3.0* 3.2*     No results for input(s): PH, PCO2, PO2, HCO3, FIO2 in the last 72 hours. 24 Hour Results:  Recent Results (from the past 24 hour(s))   GLUCOSE, POC    Collection Time: 01/01/22  7:12 AM   Result Value Ref Range    Glucose (POC) 133 (H) 65 - 117 mg/dL    Performed by [employer]    RENAL FUNCTION PANEL    Collection Time: 01/01/22  8:34 AM   Result Value Ref Range    Sodium 130 (L) 136 - 145 mmol/L    Potassium 3.5 3.5 - 5.1 mmol/L    Chloride 96 (L) 97 - 108 mmol/L    CO2 22 21 - 32 mmol/L    Anion gap 12 5 - 15 mmol/L    Glucose 132 (H) 65 - 100 mg/dL    BUN 57 (H) 6 - 20 mg/dL    Creatinine 5.25 (H) 0.55 - 1.02 mg/dL    BUN/Creatinine ratio 11 (L) 12 - 20      GFR est AA 10 (L) >60 ml/min/1.73m2    GFR est non-AA 8 (L) >60 ml/min/1.73m2    Calcium 7.4 (L) 8.5 - 10.1 mg/dL    Phosphorus 2.3 (L) 2.6 - 4.7 mg/dL    Albumin 3.3 (L) 3.5 - 5.0 g/dL   GLUCOSE, POC    Collection Time: 01/01/22 12:13 PM   Result Value Ref Range    Glucose (POC) 134 (H) 65 - 117 mg/dL    Performed by [employer]    GLUCOSE, POC    Collection Time: 01/01/22  4:05 PM   Result Value Ref Range    Glucose (POC) 113 65 - 117 mg/dL    Performed by [employer]        XR CHEST PORT   Final Result   FINDINGS: IMPRESSION: Single frontal view of the chest. Interval placement right   dialysis catheter distal tip SVC/RA junction region. No pneumothorax. Median sternotomy status post CABG. Coronary artery stenting. Unchanged mild   cardiomegaly. No vascular congestion or pulmonary edema. The lungs are well inflated. No infiltrate, pleural effusion, lobar   consolidation. US RETROPERITONEUM COMP   Final Result   Echogenic right kidney consistent with medical renal disease. No   right-sided hydronephrosis. Right renal cysts. Reported left nephrectomy. Assessment:  Acute non-STEMI. Patient to have cardiac catheterization following initiation of hemodialysis- anticipate Monday with Dr eKnnedy Escalante if volume status improved. Patient is agreeable.     Acute on chronic HFrEF    Chronic kidney disease stage V.  Started hemodialysis today    Unilateral kidney with history of nephrectomy for renal cell carcinoma    History of renal artery stenosis of solitary right kidney    Anemia of chronic kidney disease    Coronary artery disease with history of CABG and PCI    Diabetes mellitus type 2    Hypertension: Hypotension with HD. 12/31:-Discontinue amlodipine Imdur and hydralazine. continue metoprolol 25 mg twice a day. Hyperlipidemia    Peripheral vascular disease with history of left SFA stenosis    Hypokalemia    Plan:  HD per nephrology  Cardiac cath anticipated Monday. Cx input noted, continue to follow recommendations. Stopped  amlodipine Imdur and hydralazine &continue metoprolol 25 mg twice a day. Continues Ranexa. Stopped metolazone, lasix    Care Plan discussed with: Patient/Family, son at the bedside    Disposition: Continued inpatient care, hd initiated, cm working on chair in [place]. Cardiac cath anticipated Monday. Total time spent with patient: 30 minutes.     Kristen Patton MD

## 2022-01-01 NOTE — PROGRESS NOTES
Patient: Silvia Reed MRN: [id number]     YOB: 1950  Age: 66 y.o. Sex: female      Consult requested by: Wallace Morales MD    Subjective: The patient is seen in the room.   She had HD cath placed on 12/30  She had her first dialysis session 12/31 and tolerated well    Past Medical History:  Past Medical History:   Diagnosis Date    CAD (coronary artery disease)     Diabetes ([address]     Heart failure ([address]     Hypertension        Past Surgical History:  Past Surgical History:   Procedure Laterality Date    [place] CARDIAC SURG PROCEDURE UNLIST         Family History:  Family History   Problem Relation Age of Onset    Hypertension Father        Social History:  Social History     Socioeconomic History    Marital status:      Spouse name: Not on file    Number of children: Not on file    Years of education: Not on file    Highest education level: Not on file   Occupational History    Not on file   Tobacco Use    Smoking status: Former Smoker    Smokeless tobacco: Former User     Quit date: 1/1/2018   Vaping Use    Vaping Use: Never used   Substance and Sexual Activity    Alcohol use: Not Currently    Drug use: Never    Sexual activity: Not on file   Other Topics Concern     Service Not Asked    Blood Transfusions Not Asked    Caffeine Concern Not Asked    Occupational Exposure Not Asked    Hobby Hazards Not Asked    Sleep Concern Not Asked    Stress Concern Not Asked    Weight Concern Not Asked    Special Diet Not Asked    Back Care Not Asked    Exercise Not Asked    Bike Helmet Not Asked   [address] Not Asked    Self-Exams Not Asked   Social History Narrative    Not on file     Social Determinants of Health     Financial Resource Strain:     Difficulty of Paying Living Expenses: Not on file   Food Insecurity:     Worried About Running Out of Food in the Last Year: Not on file    [address] in the Last Year: Not on file   Transportation Needs:     Lack of Transportation (Medical): Not on file    Lack of Transportation (Non-Medical): Not on file   Physical Activity:     Days of Exercise per Week: Not on file    Minutes of Exercise per Session: Not on file   Stress:     Feeling of Stress : Not on file   Social Connections:     Frequency of Communication with Friends and Family: Not on file    Frequency of Social Gatherings with Friends and Family: Not on file    Attends [employer] Services: Not on file    Active Member of [address] [place] [address] [employer] or Organizations: Not on file    Attends Club or Organization Meetings: Not on file    Marital Status: Not on file   Intimate Partner Violence:     Fear of Current or Ex-Partner: Not on file    Emotionally Abused: Not on file    Physically Abused: Not on file    Sexually Abused: Not on file   Housing Stability:     Unable to Pay for Housing in the Last Year: Not on file    Number of [place] in the Last Year: Not on file    Unstable Housing in the Last Year: Not on file       Allergies   Allergen Reactions    Penicillin G Hives       Review of Systems:  No fever or chills. No sore throat. No cough or hemoptysis. + shortness of breath &  chest pain. +  orthopnea     No nausea, vomiting, abdominal pain, melena or hematochezia.    + B/L LE swelling, no joint paints. No muscle aches. No skin changes. No dizziness or lightheadedness. No headaches. Objective:     Vitals:    12/31/21 1515 12/31/21 1922 01/01/22 0058 01/01/22 0708   BP: 126/65 (!) 141/60 132/75 132/61   Pulse: 75 77 78 81   Resp: 20 20 18 18   Temp: 98.2 °F (36.8 °C) 98.9 °F (37.2 °C) 98.7 °F (37.1 °C) 98.5 °F (36.9 °C)   TempSrc:       SpO2: 99% 98% 100% 100%   Weight:       Height:            Intake and Output:  Current Shift: No intake/output data recorded.   Last three shifts: 12/30 1901 - 01/01 0700  In: -   Out: 1000     Physical Exam:   GENERAL: alert, cooperative, no distress, appears stated age  EYE: negative  THROAT & NECK: normal, no erythema or exudates noted. , and JVD  LUNG: clear to auscultation bilaterally, RT LE lower 1/3 fine rales,  Left side clear   HEART: regular rate and rhythm, S1, S2 normal, no murmur, click, rub or gallop  ABDOMEN: soft, non-tender. Bowel sounds normal. No masses,  no organomegaly   - No fallon  EXTREMITIES:  2+ pitting edema  SKIN: Normal. and no rash or abnormalities  NEUROLOGIC: negative , no gross neuro deficits. PSYCHIATRIC: non focal    Data Review    Recent Results (from the past 24 hour(s))   MAGNESIUM    Collection Time: 12/31/21  4:26 PM   Result Value Ref Range    Magnesium 1.8 1.6 - 2.4 mg/dL   GLUCOSE, POC    Collection Time: 12/31/21  5:12 PM   Result Value Ref Range    Glucose (POC) 139 (H) 65 - 117 mg/dL    Performed by [employer]    GLUCOSE, POC    Collection Time: 01/01/22  7:12 AM   Result Value Ref Range    Glucose (POC) 133 (H) 65 - 117 mg/dL    Performed by [employer]    RENAL FUNCTION PANEL    Collection Time: 01/01/22  8:34 AM   Result Value Ref Range    Sodium 130 (L) 136 - 145 mmol/L    Potassium 3.5 3.5 - 5.1 mmol/L    Chloride 96 (L) 97 - 108 mmol/L    CO2 22 21 - 32 mmol/L    Anion gap 12 5 - 15 mmol/L    Glucose 132 (H) 65 - 100 mg/dL    BUN 57 (H) 6 - 20 mg/dL    Creatinine 5.25 (H) 0.55 - 1.02 mg/dL    BUN/Creatinine ratio 11 (L) 12 - 20      GFR est AA 10 (L) >60 ml/min/1.73m2    GFR est non-AA 8 (L) >60 ml/min/1.73m2    Calcium 7.4 (L) 8.5 - 10.1 mg/dL    Phosphorus 2.3 (L) 2.6 - 4.7 mg/dL    Albumin 3.3 (L) 3.5 - 5.0 g/dL          Imaging -     1. Cxray - PROCEDURE: XR CHEST PORT     HISTORY:Short of breath     COMPARISON:None        TECHNIQUE: AP portable chest     LIMITATIONS: None     TUBES/LINES: None     LUNG PARENCHYMA/PLEURA: Calcified nodule in the right lung base. Mildly  increased interstitial markings bilaterally.  No pleural effusion although there  is some subpleural edema  TRACHEA/BRONCHI: Normal  PULMONARY VESSELS: Mild cephalization of the vasculature  HEART: Mild cardiomegaly. There is evidence of previous cardiac surgery and  stent placement. MEDIASTINUM: Normal  BONE/SOFT TISSUES: No acute process     OTHER: None     IMPRESSION  Mild cardiomegaly with mild changes of cardiac decompensation in the  lungs. No florid CHF. .        EKG - Normal sinus rhythm   Marked ST abnormality, possible lateral subendocardial injury   Abnormal ECG   When compared with ECG of 25-DEC-2021 22:51,   PREVIOUS ECG IS PRESENT   Confirmed by PACO MACARIO, Alvares Proffer ([id number]) on 12/26/2021 12:16:42 PM     Assessment & Plan:     Hospital Problems  Date Reviewed: 12/26/2021          Codes Class Noted POA    * (Principal) NSTEMI (non-ST elevated myocardial infarction) ([facility] ICD-10-CM: I21.4  ICD-9-CM: 410.70  12/26/2021 Yes        HTN (hypertension) ICD-10-CM: I10  ICD-9-CM: 401.9  12/26/2021 Yes        HLD (hyperlipidemia) ICD-10-CM: E78.5  ICD-9-CM: 272.4  12/26/2021 Yes        Edema ICD-10-CM: R60.9  ICD-9-CM: 782.3  12/26/2021 Yes        Type 2 diabetes mellitus, with long-term current use of insulin (HCC) ICD-10-CM: E11.9, Z79.4  ICD-9-CM: 250.00, V58.67  12/26/2021 Yes        CKD (chronic kidney disease) stage 5, GFR less than 15 ml/min (HCC) ICD-10-CM: N18.5  ICD-9-CM: 585.5  12/26/2021 Yes        Anemia ICD-10-CM: D64.9  ICD-9-CM: 285.9  12/26/2021 Yes        Acquired absence of kidney ICD-10-CM: Z90.5  ICD-9-CM: V45.73  12/26/2021 Unknown            1- JIM on possibly advanced CKD stage unknown:   -Since admission Cr 5.7->6.9 and BUN 80  -Unknown baseline.   -she probably had advanced CKD due to which her nephrologist KEN BLANCO [facility] had discussed about starting dialysis with her.  -Renal US showed No right-sided hydronephrosis.   s/p left nephrectomy.  -Clinically volume overload, no uremia  -Plan to place a temporary dialysis catheter which we can eventually exchange to permanent catheter.   -I have already spoken with the  to set up her dialysis chair close to her home in [place].  -She had her first dialysis 12/31.  -plan for HD am.     2-Hypokalemia:  -from loops and thiazides  -will DC metolazone and lasix  -will resume KCL 40 meq daily as K 3.1. Donavan Gutter -will use 3K bath. 3-Anemia:  -likely due to CKD. -Received 1 unit of PRBC in the ED. -pending iron studies  -started on Epo iv MWF.    4-NSTEMI   -started on heparin drip.  -Echo LVEF 30-35%  -Cardio can proceed for CC now. 5-Hypertension:  -blood pressure currently better controlled. -Patient became hypotensive during dialysis  -Discontinue amlodipine Imdur and hydralazine.  -I will continue metoprolol 25 mg twice a day.     6-CHF:  -Decompensated and fluid overload due to #1  -LVEF 30 to 35%  -will dc metolazone  -will dc lasix    7-Renal osteodystrophy:  -Ca 10.4, Phos 3.5  - iPTH 236 and Vit D 50.1  -likely primary HPT  -started on sensipar 30 mg daily    Signed By: Stephanie Mccracken MD     January 1, 2022

## 2022-01-01 NOTE — PROGRESS NOTES
Cardiology Progress Note      1/1/2022 2:49 PM    Admit Date: 12/26/2021    Admit Diagnosis: NSTEMI (non-ST elevated myocardial infarction) ([facility] [I21.4]      Subjective:   Patient seen and examined; she is chest pain free; she had dialysis yesterday and tolerated it well    Visit Vitals  BP (!) 150/78 (BP 1 Location: Left upper arm, BP Patient Position: At rest;Supine)   Pulse 88   Temp 99.6 °F (37.6 °C)   Resp 20   Ht 4' 11\" (1.499 m)   Wt 81.2 kg (179 lb 0.2 oz)   SpO2 100%   BMI 36.16 kg/m²     Current Facility-Administered Medications   Medication Dose Route Frequency    potassium chloride (K-DUR, KLOR-CON M20) SR tablet 40 mEq  40 mEq Oral DAILY    heparin (porcine) 1,000 unit/mL injection 2,200 Units  2,200 Units Hemodialysis DIALYSIS PRN    cinacalcet (SENSIPAR) tablet 30 mg  30 mg Oral QHS    epoetin ko-epbx (RETACRIT) injection 10,000 Units  10,000 Units SubCUTAneous Q MON, WED & FRI    glucose chewable tablet 16 g  4 Tablet Oral PRN    dextrose (D50W) injection syrg 12.5-25 g  25-50 mL IntraVENous PRN    glucagon (GLUCAGEN) injection 1 mg  1 mg IntraMUSCular PRN    sodium chloride (NS) flush 5-40 mL  5-40 mL IntraVENous Q8H    sodium chloride (NS) flush 5-40 mL  5-40 mL IntraVENous PRN    insulin glargine (LANTUS) injection 25 Units  25 Units SubCUTAneous QHS    insulin lispro (HUMALOG) injection   SubCUTAneous AC&HS    acetaminophen (TYLENOL) tablet 650 mg  650 mg Oral Q4H PRN    0.9% sodium chloride infusion 250 mL  250 mL IntraVENous PRN    aspirin chewable tablet 81 mg  81 mg Oral DAILY    cetirizine (ZYRTEC) tablet 10 mg  10 mg Oral DAILY    clopidogreL (PLAVIX) tablet 75 mg  75 mg Oral DAILY    metoprolol succinate (TOPROL-XL) XL tablet 50 mg  50 mg Oral DAILY    nitroglycerin (NITROSTAT) tablet 0.4 mg  0.4 mg SubLINGual Q5MIN PRN    ranolazine ER (RANEXA) tablet 500 mg  500 mg Oral BID    atorvastatin (LIPITOR) tablet 40 mg  40 mg Oral QHS    cholecalciferol (VITAMIN D3) (1000 Units /25 mcg) tablet 1,000 Units  1,000 Units Oral DAILY    ondansetron (ZOFRAN) injection 4 mg  4 mg IntraVENous Q6H PRN         Objective:      Physical Exam:  Visit Vitals  BP (!) 150/78 (BP 1 Location: Left upper arm, BP Patient Position: At rest;Supine)   Pulse 88   Temp 99.6 °F (37.6 °C)   Resp 20   Ht 4' 11\" (1.499 m)   Wt 81.2 kg (179 lb 0.2 oz)   SpO2 100%   BMI 36.16 kg/m²     General Appearance:  Well developed, well nourished,alert and oriented x 3, and individual in no acute distress. Ears/Nose/Mouth/Throat:   Hearing grossly normal.         Neck: Supple. Chest:   Lungs clear to auscultation bilaterally. Cardiovascular:  Regular rate and rhythm, S1, S2 normal, no murmur. Abdomen:   Soft, non-tender, bowel sounds are active. Extremities: No edema bilaterally. Skin: Warm and dry.                Data Review:   Labs:    Recent Results (from the past 24 hour(s))   MAGNESIUM    Collection Time: 12/31/21  4:26 PM   Result Value Ref Range    Magnesium 1.8 1.6 - 2.4 mg/dL   GLUCOSE, POC    Collection Time: 12/31/21  5:12 PM   Result Value Ref Range    Glucose (POC) 139 (H) 65 - 117 mg/dL    Performed by Litzy Read    GLUCOSE, POC    Collection Time: 01/01/22  7:12 AM   Result Value Ref Range    Glucose (POC) 133 (H) 65 - 117 mg/dL    Performed by Litzy Read    RENAL FUNCTION PANEL    Collection Time: 01/01/22  8:34 AM   Result Value Ref Range    Sodium 130 (L) 136 - 145 mmol/L    Potassium 3.5 3.5 - 5.1 mmol/L    Chloride 96 (L) 97 - 108 mmol/L    CO2 22 21 - 32 mmol/L    Anion gap 12 5 - 15 mmol/L    Glucose 132 (H) 65 - 100 mg/dL    BUN 57 (H) 6 - 20 mg/dL    Creatinine 5.25 (H) 0.55 - 1.02 mg/dL    BUN/Creatinine ratio 11 (L) 12 - 20      GFR est AA 10 (L) >60 ml/min/1.73m2    GFR est non-AA 8 (L) >60 ml/min/1.73m2    Calcium 7.4 (L) 8.5 - 10.1 mg/dL    Phosphorus 2.3 (L) 2.6 - 4.7 mg/dL    Albumin 3.3 (L) 3.5 - 5.0 g/dL   GLUCOSE, POC    Collection Time: 01/01/22 12:13 PM   Result Value Ref Range    Glucose (POC) 134 (H) 65 - 117 mg/dL    Performed by Juany Cox        Telemetry: normal sinus rhythm      Assessment:   NSTEMI  Acute on chronic HFrEF  ESRD on HD  S/p nephrectomy  Diabetes Mellitus  Hypertension  Hyperlipidemia    Plan:   -She feels better following hemodialysis. Plans for dialysis tomorrow as well  -Discussed risks/benefits/alternates to cardiac catheterization; patient agrees to proceed; there is significant stress around her current ailments as well which I counseled her regarding  -She is eager to go home and does not want to wait until Monday.   Explained to her that dialysis in her hometown close to [place] also needs to be set up before she will be able to be discharged.  -We will plan for cardiac catheterization on Monday with Dr. Kumar Lomeli  -We will follow

## 2022-01-02 LAB
ALBUMIN SERPL-MCNC: 3.2 G/DL (ref 3.5–5)
ANION GAP SERPL CALC-SCNC: 10 MMOL/L (ref 5–15)
BUN SERPL-MCNC: 27 MG/DL (ref 6–20)
BUN/CREAT SERPL: 8 (ref 12–20)
CA-I BLD-MCNC: 8.7 MG/DL (ref 8.5–10.1)
CHLORIDE SERPL-SCNC: 95 MMOL/L (ref 97–108)
CO2 SERPL-SCNC: 27 MMOL/L (ref 21–32)
CREAT SERPL-MCNC: 3.21 MG/DL (ref 0.55–1.02)
ERYTHROCYTE [DISTWIDTH] IN BLOOD BY AUTOMATED COUNT: 16.7 % (ref 11.5–14.5)
GLUCOSE BLD STRIP.AUTO-MCNC: 123 MG/DL (ref 65–117)
GLUCOSE BLD STRIP.AUTO-MCNC: 156 MG/DL (ref 65–117)
GLUCOSE BLD STRIP.AUTO-MCNC: 162 MG/DL (ref 65–117)
GLUCOSE SERPL-MCNC: 155 MG/DL (ref 65–100)
HCT VFR BLD AUTO: 25.9 % (ref 35–47)
HGB BLD-MCNC: 8.1 G/DL (ref 11.5–16)
MCH RBC QN AUTO: 24.5 PG (ref 26–34)
MCHC RBC AUTO-ENTMCNC: 31.3 G/DL (ref 30–36.5)
MCV RBC AUTO: 78.2 FL (ref 80–99)
NRBC # BLD: 0 K/UL (ref 0–0.01)
NRBC BLD-RTO: 0 PER 100 WBC
PERFORMED BY, TECHID: ABNORMAL
PHOSPHATE SERPL-MCNC: 2.2 MG/DL (ref 2.6–4.7)
PLATELET # BLD AUTO: 300 K/UL (ref 150–400)
PMV BLD AUTO: 11.1 FL (ref 8.9–12.9)
POTASSIUM SERPL-SCNC: 3.6 MMOL/L (ref 3.5–5.1)
RBC # BLD AUTO: 3.31 M/UL (ref 3.8–5.2)
SODIUM SERPL-SCNC: 132 MMOL/L (ref 136–145)
WBC # BLD AUTO: 18.1 K/UL (ref 3.6–11)

## 2022-01-02 PROCEDURE — 90935 HEMODIALYSIS ONE EVALUATION: CPT

## 2022-01-02 PROCEDURE — 74011250637 HC RX REV CODE- 250/637: Performed by: INTERNAL MEDICINE

## 2022-01-02 PROCEDURE — 85027 COMPLETE CBC AUTOMATED: CPT

## 2022-01-02 PROCEDURE — 74011636637 HC RX REV CODE- 636/637: Performed by: HOSPITALIST

## 2022-01-02 PROCEDURE — 65270000029 HC RM PRIVATE

## 2022-01-02 PROCEDURE — 82962 GLUCOSE BLOOD TEST: CPT

## 2022-01-02 PROCEDURE — 36415 COLL VENOUS BLD VENIPUNCTURE: CPT

## 2022-01-02 PROCEDURE — 80069 RENAL FUNCTION PANEL: CPT

## 2022-01-02 PROCEDURE — 74011250637 HC RX REV CODE- 250/637: Performed by: HOSPITALIST

## 2022-01-02 RX ADMIN — ATORVASTATIN CALCIUM 40 MG: 40 TABLET, FILM COATED ORAL at 21:12

## 2022-01-02 RX ADMIN — SODIUM CHLORIDE, PRESERVATIVE FREE 10 ML: 5 INJECTION INTRAVENOUS at 16:06

## 2022-01-02 RX ADMIN — SODIUM CHLORIDE, PRESERVATIVE FREE 10 ML: 5 INJECTION INTRAVENOUS at 21:13

## 2022-01-02 RX ADMIN — METOPROLOL SUCCINATE 50 MG: 50 TABLET, EXTENDED RELEASE ORAL at 08:31

## 2022-01-02 RX ADMIN — INSULIN LISPRO 3 UNITS: 100 INJECTION, SOLUTION INTRAVENOUS; SUBCUTANEOUS at 17:33

## 2022-01-02 RX ADMIN — CINACALCET 30 MG: 30 TABLET, FILM COATED ORAL at 21:12

## 2022-01-02 RX ADMIN — CETIRIZINE HYDROCHLORIDE 10 MG: 10 TABLET, FILM COATED ORAL at 08:32

## 2022-01-02 RX ADMIN — CLOPIDOGREL BISULFATE 75 MG: 75 TABLET ORAL at 08:32

## 2022-01-02 RX ADMIN — ASPIRIN 81 MG CHEWABLE TABLET 81 MG: 81 TABLET CHEWABLE at 08:31

## 2022-01-02 RX ADMIN — SODIUM CHLORIDE, PRESERVATIVE FREE 10 ML: 5 INJECTION INTRAVENOUS at 06:08

## 2022-01-02 RX ADMIN — Medication 1000 UNITS: at 08:31

## 2022-01-02 RX ADMIN — POTASSIUM CHLORIDE 40 MEQ: 1500 TABLET, EXTENDED RELEASE ORAL at 16:05

## 2022-01-02 RX ADMIN — RANOLAZINE 500 MG: 500 TABLET, FILM COATED, EXTENDED RELEASE ORAL at 08:31

## 2022-01-02 RX ADMIN — RANOLAZINE 500 MG: 500 TABLET, FILM COATED, EXTENDED RELEASE ORAL at 21:00

## 2022-01-02 RX ADMIN — INSULIN GLARGINE 25 UNITS: 100 INJECTION, SOLUTION SUBCUTANEOUS at 21:12

## 2022-01-02 NOTE — PROGRESS NOTES
Patient: Juvencio Escamilla MRN: [id number]     YOB: 1950  Age: 66 y.o. Sex: female      Consult requested by: Iker Dorsey MD    Subjective: The patient is seen in the room.   She had HD cath placed on 12/30  She had her first dialysis session 12/31 and tolerated well  S/p HD 1/02 and removed 1.5 L    Past Medical History:  Past Medical History:   Diagnosis Date    CAD (coronary artery disease)     [facility]     Heart failure ([facility]     Hypertension        Past Surgical History:  Past Surgical History:   Procedure Laterality Date    [place] CARDIAC SURG PROCEDURE UNLIST         Family History:  Family History   Problem Relation Age of Onset    Hypertension Father        Social History:  Social History     Socioeconomic History    Marital status:      Spouse name: Not on file    Number of children: Not on file    Years of education: Not on file    Highest education level: Not on file   Occupational History    Not on file   Tobacco Use    Smoking status: Former Smoker    Smokeless tobacco: Former User     Quit date: 1/1/2018   Vaping Use    Vaping Use: Never used   Substance and Sexual Activity    Alcohol use: Not Currently    Drug use: Never    Sexual activity: Not on file   Other Topics Concern     Service Not Asked    Blood Transfusions Not Asked    Caffeine Concern Not Asked    Occupational Exposure Not Asked    Hobby Hazards Not Asked    Sleep Concern Not Asked    Stress Concern Not Asked    Weight Concern Not Asked    Special Diet Not Asked    Back Care Not Asked    Exercise Not Asked    Bike Helmet Not Asked   [address] Not Asked    Self-Exams Not Asked   Social History Narrative    Not on file     Social Determinants of Health     Financial Resource Strain:     Difficulty of Paying Living Expenses: Not on file   Food Insecurity:     Worried About Running Out of Food in the Last Year: Not on file    Ran Out of Food in the Last Year: Not on file   Transportation Needs:     Lack of Transportation (Medical): Not on file    Lack of Transportation (Non-Medical): Not on file   Physical Activity:     Days of Exercise per Week: Not on file    Minutes of Exercise per Session: Not on file   Stress:     Feeling of Stress : Not on file   Social Connections:     Frequency of Communication with Friends and Family: Not on file    Frequency of Social Gatherings with Friends and Family: Not on file    Attends [employer] Services: Not on file    Active Member of [address] [place] [address] [employer] or Organizations: Not on file    Attends Club or Organization Meetings: Not on file    Marital Status: Not on file   Intimate Partner Violence:     Fear of Current or Ex-Partner: Not on file    Emotionally Abused: Not on file    Physically Abused: Not on file    Sexually Abused: Not on file   Housing Stability:     Unable to Pay for Housing in the Last Year: Not on file    Number of [place] in the Last Year: Not on file    Unstable Housing in the Last Year: Not on file       Allergies   Allergen Reactions    Penicillin G Hives       Review of Systems:  No fever or chills. No sore throat. No cough or hemoptysis. + shortness of breath &  chest pain. +  orthopnea     No nausea, vomiting, abdominal pain, melena or hematochezia.    + B/L LE swelling, no joint paints. No muscle aches. No skin changes. No dizziness or lightheadedness. No headaches. Objective:     Vitals:    01/02/22 1000 01/02/22 1030 01/02/22 1100 01/02/22 1509   BP: (!) 150/77 (!) 156/70 (!) 148/76 133/69   Pulse:    88   Resp:    18   Temp:    98.3 °F (36.8 °C)   TempSrc:       SpO2:    100%   Weight:       Height:            Intake and Output:  Current Shift: No intake/output data recorded. Last three shifts: No intake/output data recorded.     Physical Exam:   GENERAL: alert, cooperative, no distress, appears stated age  EYE: negative  THROAT & NECK: normal, no erythema or exudates noted. , and JVD  LUNG: clear to auscultation bilaterally, RT LE lower 1/3 fine rales,  Left side clear   HEART: regular rate and rhythm, S1, S2 normal, no murmur, click, rub or gallop  ABDOMEN: soft, non-tender. Bowel sounds normal. No masses,  no organomegaly   - No fallon  EXTREMITIES:  2+ pitting edema  SKIN: Normal. and no rash or abnormalities  NEUROLOGIC: negative , no gross neuro deficits. PSYCHIATRIC: non focal    Data Review    Recent Results (from the past 24 hour(s))   GLUCOSE, POC    Collection Time: 01/01/22  9:13 PM   Result Value Ref Range    Glucose (POC) 153 (H) 65 - 117 mg/dL    Performed by [address], POC    Collection Time: 01/02/22  7:13 AM   Result Value Ref Range    Glucose (POC) 123 (H) 65 - 117 mg/dL    Performed by Bee Bolden    GLUCOSE, POC    Collection Time: 01/02/22  3:13 PM   Result Value Ref Range    Glucose (POC) 162 (H) 65 - 117 mg/dL    Performed by Mayte Garrett -     [address]. Cxray - PROCEDURE: XR CHEST PORT     HISTORY:Short of breath     COMPARISON:None        TECHNIQUE: AP portable chest     LIMITATIONS: None     TUBES/LINES: None     LUNG PARENCHYMA/PLEURA: Calcified nodule in the right lung base. Mildly  increased interstitial markings bilaterally. No pleural effusion although there  is some subpleural edema  TRACHEA/BRONCHI: Normal  PULMONARY VESSELS: Mild cephalization of the vasculature  HEART: Mild cardiomegaly. There is evidence of previous cardiac surgery and  stent placement. MEDIASTINUM: Normal  BONE/SOFT TISSUES: No acute process     OTHER: None     IMPRESSION  Mild cardiomegaly with mild changes of cardiac decompensation in the  lungs. No florid CHF. .        EKG - Normal sinus rhythm   Marked ST abnormality, possible lateral subendocardial injury   Abnormal ECG   When compared with ECG of 25-DEC-2021 22:51,   PREVIOUS ECG IS PRESENT   Confirmed by Dylon NATONIO MD [phone]) on 12/26/2021 12:16:42 PM     Assessment & Plan:     Hospital Problems  Date Reviewed: 12/26/2021          Codes Class Noted POA    * (Principal) NSTEMI (non-ST elevated myocardial infarction) ([facility] ICD-10-CM: I21.4  ICD-9-CM: 410.70  12/26/2021 Yes        HTN (hypertension) ICD-10-CM: I10  ICD-9-CM: 401.9  12/26/2021 Yes        HLD (hyperlipidemia) ICD-10-CM: E78.5  ICD-9-CM: 272.4  12/26/2021 Yes        Edema ICD-10-CM: R60.9  ICD-9-CM: 782.3  12/26/2021 Yes        Type 2 diabetes mellitus, with long-term current use of insulin (HCC) ICD-10-CM: E11.9, Z79.4  ICD-9-CM: 250.00, V58.67  12/26/2021 Yes        CKD (chronic kidney disease) stage 5, GFR less than 15 ml/min (HCC) ICD-10-CM: N18.5  ICD-9-CM: 585.5  12/26/2021 Yes        Anemia ICD-10-CM: D64.9  ICD-9-CM: 285.9  12/26/2021 Yes        Acquired absence of kidney ICD-10-CM: Z90.5  ICD-9-CM: V45.73  12/26/2021 Unknown            1- JIM on possibly advanced CKD stage unknown:   -Since admission Cr 5.7->6.9 and BUN 80  -Unknown baseline.   -she probably had advanced CKD due to which her nephrologist [facility] had discussed about starting dialysis with her.  -Renal US showed No right-sided hydronephrosis. s/p left nephrectomy.  -Clinically volume overload, no uremia  -Plan to place a temporary dialysis catheter which we can eventually exchange to permanent catheter.   -I have already spoken with the  to set up her dialysis chair close to her home in [facility].  -She had her first dialysis 12/31.  -s/p HD 1/02, removed 1.5 L  -will keep TTS schedule     2-Hypokalemia:  -from loops and thiazides  -will DC metolazone and lasix  -will resume KCL 40 meq daily as K was  3.1. Mary Ellen Miles -will use 3K bath. 3-Anemia:  -likely due to CKD. -Received 1 unit of PRBC in the ED. -pending iron studies  -started on Epo iv MWF.    4-NSTEMI   -started on heparin drip.  -Echo LVEF 30-35%  -Cardio can proceed for CC now.      5-Hypertension:  -blood pressure currently better controlled. -Patient became hypotensive during dialysis  -Discontinue amlodipine Imdur and hydralazine.  -I will continue metoprolol 25 mg twice a day.     6-CHF:  -Decompensated and fluid overload due to #1  -LVEF 30 to 35%  -will dc metolazone  -will dc lasix    7-Renal osteodystrophy:  -Ca 10.4, Phos 3.5  - iPTH 236 and Vit D 50.1  -likely primary HPT  -started on sensipar 30 mg daily    Signed By: Ema Leiva MD     January 2, 2022

## 2022-01-02 NOTE — PROGRESS NOTES
Tx initiated via a patent right IJ. NO s/s of infection or skin breakdown.  Net fluid goal 1.5kgs and 3.5 hours

## 2022-01-02 NOTE — PROGRESS NOTES
Hospitalist Progress Note               Daily Progress Note: 1/2/2022      Subjective: The patient is seen for follow up. Is a 43-year-old female with a history of coronary artery disease with CABG in 2001 and PCI as well as non-STEMI, stage V chronic kidney disease with a unilateral kidney, diabetes, history of nephrectomy for renal cell carcinoma who came to the ED late last night with complaints of shortness of breath and left-sided chest pain. Also with lower extremity edema. In the ED she was hypertensive with otherwise stable vital signs. Labs showed a hemoglobin of 7.1, potassium 2.9, creatinine 5.5 which is her baseline. Initial troponin was 876 with a repeat of 1068. Patient is from [place] and previously received all her care at [facility] but she went to [facility] instead because she did not trust the other hospital    Patient is normally followed by a cardiologist in [place]. Her nephrologist has previously recommended that she start dialysis but patient has refused    Her troponin peaked at 1191    Renal US showed No right-sided hydronephrosis.  s/p left nephrectomy. Clinically volume overload, no uremia. Patient was initially refusing consideration of hemodialysis but after further discussion with nephrology, she is agreeable. Temporary dialysis catheter which we can eventually exchange to permanent catheter.  to set up her dialysis chair close to her home in [place]. Cardiology will plan on cardiac catheterization on Monday    Echo showed an EF of 30 to 35% with mild to moderate mitral insufficiency and mild mitral stenosis. There was a small pericardial effusion    ---------    Patient seen for follow-up. No acute events. No cp or sob. BP stable. HD this morning. Agreeable for cath on Monday. Anxious to go home, advised limiting factor includes arranging for hd chair.          12/31  HD initiated, hypotensive during, bp rx adjusted, nephro appreciated. Denies cp or sob. Spo2 99% room air. Cardio input noted, cardiac cath anticipated Monday anticipating improved volume status. No new problems.         Problem List:  Problem List as of 1/2/2022 Date Reviewed: 12/26/2021          Codes Class Noted - Resolved    * (Principal) NSTEMI (non-ST elevated myocardial infarction) ([facility] [address] ICD-10-CM: I21.4  ICD-9-CM: 410.70  12/26/2021 - Present        HTN (hypertension) ICD-10-CM: I10  ICD-9-CM: 401.9  12/26/2021 - Present        HLD (hyperlipidemia) ICD-10-CM: E78.5  ICD-9-CM: 272.4  12/26/2021 - Present        Edema ICD-10-CM: R60.9  ICD-9-CM: 782.3  12/26/2021 - Present        Type 2 diabetes mellitus, with long-term current use of insulin (HCC) ICD-10-CM: E11.9, Z79.4  ICD-9-CM: 250.00, V58.67  12/26/2021 - Present        CKD (chronic kidney disease) stage 5, GFR less than 15 ml/min (HCC) ICD-10-CM: N18.5  ICD-9-CM: 585.5  12/26/2021 - Present        Anemia ICD-10-CM: D64.9  ICD-9-CM: 285.9  12/26/2021 - Present        Acquired absence of kidney ICD-10-CM: Z90.5  ICD-9-CM: V45.73  12/26/2021 - Present              Medications reviewed  Current Facility-Administered Medications   Medication Dose Route Frequency    epoetin ko-epbx (RETACRIT) injection 10,000 Units  10,000 Units IntraVENous ONCE    potassium chloride (K-DUR, KLOR-CON M20) SR tablet 40 mEq  40 mEq Oral DAILY    heparin (porcine) 1,000 unit/mL injection 2,200 Units  2,200 Units Hemodialysis DIALYSIS PRN    cinacalcet (SENSIPAR) tablet 30 mg  30 mg Oral QHS    epoetin ko-epbx (RETACRIT) injection 10,000 Units  10,000 Units SubCUTAneous Q MON, WED & FRI    glucose chewable tablet 16 g  4 Tablet Oral PRN    dextrose (D50W) injection syrg 12.5-25 g  25-50 mL IntraVENous PRN    glucagon (GLUCAGEN) injection 1 mg  1 mg IntraMUSCular PRN    sodium chloride (NS) flush 5-40 mL  5-40 mL IntraVENous Q8H    sodium chloride (NS) flush 5-40 mL  5-40 mL IntraVENous PRN    insulin glargine (LANTUS) injection 25 Units  25 Units SubCUTAneous QHS    insulin lispro (HUMALOG) injection   SubCUTAneous AC&HS    acetaminophen (TYLENOL) tablet 650 mg  650 mg Oral Q4H PRN    0.9% sodium chloride infusion 250 mL  250 mL IntraVENous PRN    aspirin chewable tablet 81 mg  81 mg Oral DAILY    cetirizine (ZYRTEC) tablet 10 mg  10 mg Oral DAILY    clopidogreL (PLAVIX) tablet 75 mg  75 mg Oral DAILY    metoprolol succinate (TOPROL-XL) XL tablet 50 mg  50 mg Oral DAILY    nitroglycerin (NITROSTAT) tablet 0.4 mg  0.4 mg SubLINGual Q5MIN PRN    ranolazine ER (RANEXA) tablet 500 mg  500 mg Oral BID    atorvastatin (LIPITOR) tablet 40 mg  40 mg Oral QHS    cholecalciferol (VITAMIN D3) (1000 Units /25 mcg) tablet 1,000 Units  1,000 Units Oral DAILY    ondansetron (ZOFRAN) injection 4 mg  4 mg IntraVENous Q6H PRN       Review of Systems:   A comprehensive review of systems was negative except for that written in the HPI. Objective:   Physical Exam:     Visit Vitals  BP (!) 146/82   Pulse 89   Temp 98.4 °F (36.9 °C) (Oral)   Resp 16   Ht 4' 11\" (1.499 m)   Wt 81.2 kg (179 lb 0.2 oz)   SpO2 100%   BMI 36.16 kg/m²    O2 Flow Rate (L/min): 1 l/min O2 Device: None (Room air)    Temp (24hrs), Av.5 °F (36.9 °C), Min:98 °F (36.7 °C), Max:99.6 °F (37.6 °C)    No intake/output data recorded. No intake/output data recorded. General:   Awake and alert   Lungs:    Faint creps bases. Not labored. Chest wall:  No tenderness or deformity. Heart:  Regular rate and rhythm, S1, S2 normal, no murmur, click, rub or gallop. Abdomen:   Soft, non-tender. Bowel sounds normal. No masses,  No organomegaly. Extremities: Extremities normal, atraumatic, no cyanosis 3+ pitting edema. Pulses: 2+ and symmetric all extremities. Skin: Skin color, texture, turgor normal. No rashes or lesions   Neurologic: CNII-XII intact.   No gross focal deficits         Data Review:       Recent Days:  Recent Labs     21  0830   WBC 7. 6   HGB 8.3*   HCT 25.7*        Recent Labs     01/01/22  0834 12/31/21  1626 12/31/21  0830 12/30/21  1057   *  --  133* 131*   K 3.5  --  3.1* 3.7   CL 96*  --  95* 96*   CO2 22  --  26 24   *  --  98 113*   BUN 57*  --  80* 72*   CREA 5.25*  --  6.96* 6.45*   CA 7.4*  --  10.0 9.8   MG  --  1.8  --   --    PHOS 2.3*  --  5.3* 4.6   ALB 3.3*  --  3.0* 3.2*     No results for input(s): PH, PCO2, PO2, HCO3, FIO2 in the last 72 hours. 24 Hour Results:  Recent Results (from the past 24 hour(s))   GLUCOSE, POC    Collection Time: 01/01/22 12:13 PM   Result Value Ref Range    Glucose (POC) 134 (H) 65 - 117 mg/dL    Performed by Nikki Vinson    GLUCOSE, POC    Collection Time: 01/01/22  4:05 PM   Result Value Ref Range    Glucose (POC) 113 65 - 117 mg/dL    Performed by [address] [place] [address] Street, POC    Collection Time: 01/01/22  9:13 PM   Result Value Ref Range    Glucose (POC) 153 (H) 65 - 117 mg/dL    Performed by Silvina Masters, POC    Collection Time: 01/02/22  7:13 AM   Result Value Ref Range    Glucose (POC) 123 (H) 65 - 117 mg/dL    Performed by Bee Bolden        XR CHEST PORT   Final Result   FINDINGS: IMPRESSION: Single frontal view of the chest. Interval placement right   dialysis catheter distal tip SVC/RA junction region. No pneumothorax. Median sternotomy status post CABG. Coronary artery stenting. Unchanged mild   cardiomegaly. No vascular congestion or pulmonary edema. The lungs are well inflated. No infiltrate, pleural effusion, lobar   consolidation. US RETROPERITONEUM COMP   Final Result   Echogenic right kidney consistent with medical renal disease. No   right-sided hydronephrosis. Right renal cysts. Reported left nephrectomy. Assessment:  Acute non-STEMI. Patient to have cardiac catheterization following initiation of hemodialysis- anticipate Monday with Dr Joselyn Lucia if volume status improved. Patient is agreeable.     Acute on chronic HFrEF    Chronic kidney disease stage V.  Started hemodialysis 12/31, temp cath placed, perm before dc, cm working on chair - home in [place]. Unilateral kidney with history of nephrectomy for renal cell carcinoma. No rt hydronephrosis    History of renal artery stenosis of solitary right kidney    Anemia of chronic kidney disease likely, got 1 unit prbc in ed, hgb relatively stable ~ 8.3    Coronary artery disease with history of CABG and PCI. Diabetes mellitus type 2. A1c 5.6. Pta on 44 units lantus, decreased here to 25 units qhs + ssi. Blood sugars remain adequate. Hypertension: Hypotension with HD. 12/31:-Discontinued amlodipine Imdur and hydralazine. continue metoprololxl 50 mg tDaily. Hyperlipidemia on Atorvastatin    Peripheral vascular disease with history of left SFA stenosis    Hypokalemia repleted    Plan:  HD per nephrology  Cardiac cath anticipated Monday and will need hd subsequently. Cx input noted, continue to follow recommendations. continue metoprolol xl 50 mg daily  Continues Ranexa. E-poietin with HD  Continue current lantus, ssi & hypoglycemia protocol. Am labs pending, monitor k as remains on 40 meq kcl daily. vtep mechanical  Full code    Care Plan discussed with: Patient/Family, consultant. Disposition: Continued inpatient care, hd initiated, cm working on chair in [place]. Cardiac cath anticipated Monday. Total time spent with patient: 30 minutes.     Keesha Espinoza MD

## 2022-01-02 NOTE — PROGRESS NOTES
Cardiology Progress Note      1/2/2022 2:49 PM    Admit Date: 12/26/2021    Admit Diagnosis: NSTEMI (non-ST elevated myocardial infarction) ([facility] [address] [I21.4]      Subjective:   Patient seen and examined; she is chest pain free; plan dialysis later today    Visit Vitals  BP (!) 148/76   Pulse 89   Temp 98.4 °F (36.9 °C) (Oral)   Resp 16   Ht 4' 11\" (1.499 m)   Wt 81.2 kg (179 lb 0.2 oz)   SpO2 100%   BMI 36.16 kg/m²     Current Facility-Administered Medications   Medication Dose Route Frequency    epoetin ko-epbx (RETACRIT) injection 10,000 Units  10,000 Units IntraVENous ONCE    potassium chloride (K-DUR, KLOR-CON M20) SR tablet 40 mEq  40 mEq Oral DAILY    heparin (porcine) 1,000 unit/mL injection 2,200 Units  2,200 Units Hemodialysis DIALYSIS PRN    cinacalcet (SENSIPAR) tablet 30 mg  30 mg Oral QHS    epoetin ko-epbx (RETACRIT) injection 10,000 Units  10,000 Units SubCUTAneous Q MON, WED & FRI    glucose chewable tablet 16 g  4 Tablet Oral PRN    dextrose (D50W) injection syrg 12.5-25 g  25-50 mL IntraVENous PRN    glucagon (GLUCAGEN) injection 1 mg  1 mg IntraMUSCular PRN    sodium chloride (NS) flush 5-40 mL  5-40 mL IntraVENous Q8H    sodium chloride (NS) flush 5-40 mL  5-40 mL IntraVENous PRN    insulin glargine (LANTUS) injection 25 Units  25 Units SubCUTAneous QHS    insulin lispro (HUMALOG) injection   SubCUTAneous AC&HS    acetaminophen (TYLENOL) tablet 650 mg  650 mg Oral Q4H PRN    0.9% sodium chloride infusion 250 mL  250 mL IntraVENous PRN    aspirin chewable tablet 81 mg  81 mg Oral DAILY    cetirizine (ZYRTEC) tablet 10 mg  10 mg Oral DAILY    clopidogreL (PLAVIX) tablet 75 mg  75 mg Oral DAILY    metoprolol succinate (TOPROL-XL) XL tablet 50 mg  50 mg Oral DAILY    nitroglycerin (NITROSTAT) tablet 0.4 mg  0.4 mg SubLINGual Q5MIN PRN    ranolazine ER (RANEXA) tablet 500 mg  500 mg Oral BID    atorvastatin (LIPITOR) tablet 40 mg  40 mg Oral QHS    cholecalciferol (VITAMIN D3) (1000 Units /25 mcg) tablet 1,000 Units  1,000 Units Oral DAILY    ondansetron (ZOFRAN) injection 4 mg  4 mg IntraVENous Q6H PRN         Objective:      Physical Exam:  Visit Vitals  BP (!) 148/76   Pulse 89   Temp 98.4 °F (36.9 °C) (Oral)   Resp 16   Ht 4' 11\" (1.499 m)   Wt 81.2 kg (179 lb 0.2 oz)   SpO2 100%   BMI 36.16 kg/m²     General Appearance:  Well developed, well nourished,alert and oriented x 3, and individual in no acute distress. Ears/Nose/Mouth/Throat:   Hearing grossly normal.         Neck: Supple. Chest:   Lungs clear to auscultation bilaterally. Cardiovascular:  Regular rate and rhythm, S1, S2 normal, no murmur. Abdomen:   Soft, non-tender, bowel sounds are active. Extremities: No edema bilaterally. Skin: Warm and dry. Data Review:   Labs:    Recent Results (from the past 24 hour(s))   GLUCOSE, POC    Collection Time: 01/01/22  4:05 PM   Result Value Ref Range    Glucose (POC) 113 65 - 117 mg/dL    Performed by Tish Wang    GLUCOSE, POC    Collection Time: 01/01/22  9:13 PM   Result Value Ref Range    Glucose (POC) 153 (H) 65 - 117 mg/dL    Performed by Marycarmen Hong, POC    Collection Time: 01/02/22  7:13 AM   Result Value Ref Range    Glucose (POC) 123 (H) 65 - 117 mg/dL    Performed by Deb Juarez        Telemetry: normal sinus rhythm      Assessment:   NSTEMI  Acute on chronic HFrEF  ESRD on HD  S/p nephrectomy  Diabetes Mellitus  Hypertension  Hyperlipidemia    Plan:   -She feels better following hemodialysis. Plans for dialysis tomorrow as well  -Discussed risks/benefits/alternates to cardiac catheterization; patient agrees to proceed; there is significant stress around her current ailments as well which I counseled her regarding  -She is eager to go home and does not want to wait until Monday.   Explained to her that dialysis in her hometown close to [place] also needs to be set up before she will be able to be discharged.  -Plan for cardiac catheterization tomorrow morning at 7:30 AM.  Keep n.p.o. after midnight  -We will follow

## 2022-01-03 LAB
GLUCOSE BLD STRIP.AUTO-MCNC: 114 MG/DL (ref 65–117)
GLUCOSE BLD STRIP.AUTO-MCNC: 117 MG/DL (ref 65–117)
GLUCOSE BLD STRIP.AUTO-MCNC: 143 MG/DL (ref 65–117)
GLUCOSE BLD STRIP.AUTO-MCNC: 213 MG/DL (ref 65–117)
PERFORMED BY, TECHID: ABNORMAL
PERFORMED BY, TECHID: ABNORMAL
PERFORMED BY, TECHID: NORMAL
PERFORMED BY, TECHID: NORMAL

## 2022-01-03 PROCEDURE — 74011250636 HC RX REV CODE- 250/636: Performed by: INTERNAL MEDICINE

## 2022-01-03 PROCEDURE — 74011250637 HC RX REV CODE- 250/637: Performed by: HOSPITALIST

## 2022-01-03 PROCEDURE — 77030019569 HC BND COMPR RAD TERU -B: Performed by: INTERNAL MEDICINE

## 2022-01-03 PROCEDURE — 74011000250 HC RX REV CODE- 250: Performed by: INTERNAL MEDICINE

## 2022-01-03 PROCEDURE — 65270000029 HC RM PRIVATE

## 2022-01-03 PROCEDURE — 77030003394 HC NDL ART COOK -A: Performed by: INTERNAL MEDICINE

## 2022-01-03 PROCEDURE — B2131ZZ FLUOROSCOPY OF MULTIPLE CORONARY ARTERY BYPASS GRAFTS USING LOW OSMOLAR CONTRAST: ICD-10-PCS | Performed by: INTERNAL MEDICINE

## 2022-01-03 PROCEDURE — 77030016700 HC CATH ANGI DX INFN2 CARD -B: Performed by: INTERNAL MEDICINE

## 2022-01-03 PROCEDURE — 2709999900 HC NON-CHARGEABLE SUPPLY: Performed by: INTERNAL MEDICINE

## 2022-01-03 PROCEDURE — 74011000250 HC RX REV CODE- 250: Performed by: HOSPITALIST

## 2022-01-03 PROCEDURE — 74011250637 HC RX REV CODE- 250/637: Performed by: INTERNAL MEDICINE

## 2022-01-03 PROCEDURE — 99152 MOD SED SAME PHYS/QHP 5/>YRS: CPT | Performed by: INTERNAL MEDICINE

## 2022-01-03 PROCEDURE — C1894 INTRO/SHEATH, NON-LASER: HCPCS | Performed by: INTERNAL MEDICINE

## 2022-01-03 PROCEDURE — 76210000006 HC OR PH I REC 0.5 TO 1 HR: Performed by: INTERNAL MEDICINE

## 2022-01-03 PROCEDURE — C1769 GUIDE WIRE: HCPCS | Performed by: INTERNAL MEDICINE

## 2022-01-03 PROCEDURE — 82962 GLUCOSE BLOOD TEST: CPT

## 2022-01-03 PROCEDURE — 74011000636 HC RX REV CODE- 636: Performed by: INTERNAL MEDICINE

## 2022-01-03 PROCEDURE — B2111ZZ FLUOROSCOPY OF MULTIPLE CORONARY ARTERIES USING LOW OSMOLAR CONTRAST: ICD-10-PCS | Performed by: INTERNAL MEDICINE

## 2022-01-03 PROCEDURE — 4A023N7 MEASUREMENT OF CARDIAC SAMPLING AND PRESSURE, LEFT HEART, PERCUTANEOUS APPROACH: ICD-10-PCS | Performed by: INTERNAL MEDICINE

## 2022-01-03 PROCEDURE — 99153 MOD SED SAME PHYS/QHP EA: CPT | Performed by: INTERNAL MEDICINE

## 2022-01-03 PROCEDURE — 93459 L HRT ART/GRFT ANGIO: CPT | Performed by: INTERNAL MEDICINE

## 2022-01-03 PROCEDURE — 74011636637 HC RX REV CODE- 636/637: Performed by: HOSPITALIST

## 2022-01-03 PROCEDURE — 77030019698 HC SYR ANGI MDLON MRTM -A: Performed by: INTERNAL MEDICINE

## 2022-01-03 RX ORDER — SODIUM CHLORIDE 9 MG/ML
INJECTION, SOLUTION INTRAVENOUS
Status: COMPLETED | OUTPATIENT
Start: 2022-01-03 | End: 2022-01-03

## 2022-01-03 RX ORDER — HEPARIN SODIUM 200 [USP'U]/100ML
INJECTION, SOLUTION INTRAVENOUS
Status: COMPLETED | OUTPATIENT
Start: 2022-01-03 | End: 2022-01-03

## 2022-01-03 RX ORDER — HEPARIN SODIUM 1000 [USP'U]/ML
INJECTION, SOLUTION INTRAVENOUS; SUBCUTANEOUS AS NEEDED
Status: DISCONTINUED | OUTPATIENT
Start: 2022-01-03 | End: 2022-01-03 | Stop reason: HOSPADM

## 2022-01-03 RX ORDER — LIDOCAINE HYDROCHLORIDE 10 MG/ML
INJECTION INFILTRATION; PERINEURAL AS NEEDED
Status: DISCONTINUED | OUTPATIENT
Start: 2022-01-03 | End: 2022-01-03 | Stop reason: HOSPADM

## 2022-01-03 RX ORDER — SODIUM CHLORIDE 0.9 % (FLUSH) 0.9 %
5-40 SYRINGE (ML) INJECTION AS NEEDED
Status: CANCELLED | OUTPATIENT
Start: 2022-01-03

## 2022-01-03 RX ORDER — FENTANYL CITRATE 50 UG/ML
INJECTION, SOLUTION INTRAMUSCULAR; INTRAVENOUS AS NEEDED
Status: DISCONTINUED | OUTPATIENT
Start: 2022-01-03 | End: 2022-01-03 | Stop reason: HOSPADM

## 2022-01-03 RX ORDER — SODIUM CHLORIDE 0.9 % (FLUSH) 0.9 %
5-40 SYRINGE (ML) INJECTION EVERY 8 HOURS
Status: CANCELLED | OUTPATIENT
Start: 2022-01-03

## 2022-01-03 RX ADMIN — ASPIRIN 81 MG CHEWABLE TABLET 81 MG: 81 TABLET CHEWABLE at 09:22

## 2022-01-03 RX ADMIN — RANOLAZINE 500 MG: 500 TABLET, FILM COATED, EXTENDED RELEASE ORAL at 11:24

## 2022-01-03 RX ADMIN — POTASSIUM CHLORIDE 40 MEQ: 1500 TABLET, EXTENDED RELEASE ORAL at 09:22

## 2022-01-03 RX ADMIN — EPOETIN ALFA-EPBX 10000 UNITS: 10000 INJECTION, SOLUTION INTRAVENOUS; SUBCUTANEOUS at 11:24

## 2022-01-03 RX ADMIN — METOPROLOL SUCCINATE 50 MG: 50 TABLET, EXTENDED RELEASE ORAL at 09:22

## 2022-01-03 RX ADMIN — ATORVASTATIN CALCIUM 40 MG: 40 TABLET, FILM COATED ORAL at 21:50

## 2022-01-03 RX ADMIN — SODIUM CHLORIDE, PRESERVATIVE FREE 10 ML: 5 INJECTION INTRAVENOUS at 05:01

## 2022-01-03 RX ADMIN — CLOPIDOGREL BISULFATE 75 MG: 75 TABLET ORAL at 09:22

## 2022-01-03 RX ADMIN — SODIUM CHLORIDE, PRESERVATIVE FREE 10 ML: 5 INJECTION INTRAVENOUS at 13:54

## 2022-01-03 RX ADMIN — RANOLAZINE 500 MG: 500 TABLET, FILM COATED, EXTENDED RELEASE ORAL at 22:34

## 2022-01-03 RX ADMIN — INSULIN GLARGINE 25 UNITS: 100 INJECTION, SOLUTION SUBCUTANEOUS at 21:50

## 2022-01-03 RX ADMIN — SODIUM CHLORIDE, PRESERVATIVE FREE 10 ML: 5 INJECTION INTRAVENOUS at 21:50

## 2022-01-03 RX ADMIN — CINACALCET 30 MG: 30 TABLET, FILM COATED ORAL at 21:49

## 2022-01-03 RX ADMIN — CETIRIZINE HYDROCHLORIDE 10 MG: 10 TABLET, FILM COATED ORAL at 09:22

## 2022-01-03 RX ADMIN — Medication 1000 UNITS: at 09:22

## 2022-01-03 NOTE — PROGRESS NOTES
Hospitalist Progress Note               Daily Progress Note: 1/3/2022      Subjective: The patient is seen for follow up. Is a 76-year-old female with a history of coronary artery disease with CABG in 2001 and PCI as well as non-STEMI, stage V chronic kidney disease with a unilateral kidney, diabetes, history of nephrectomy for renal cell carcinoma who came to the ED late last night with complaints of shortness of breath and left-sided chest pain. Also with lower extremity edema. In the ED she was hypertensive with otherwise stable vital signs. Labs showed a hemoglobin of 7.1, potassium 2.9, creatinine 5.5 which is her baseline. Initial troponin was 876 with a repeat of 1068. Patient is from [place] and previously received all her care at [facility] but she went to [facility] instead because she did not trust the other hospital    Patient is normally followed by a cardiologist in [place]. Her nephrologist has previously recommended that she start dialysis but patient has refused    Her troponin peaked at 1191    Renal US showed No right-sided hydronephrosis.  s/p left nephrectomy. Clinically volume overload, no uremia. Patient was initially refusing consideration of hemodialysis but after further discussion with nephrology, she is agreeable. Temporary dialysis catheter which we can eventually exchange to permanent catheter.  to set up her dialysis chair close to her home in [place]. Cardiac vath 1/3/22:via left radial artery. Totally occluded grafts. Extensively stented venous graft to the circumflex that is totally occluded. RCA is extensively stented as well that is totally occluded proximally and reconstitutes after the crux by collaterals from left circumflex artery. First obtuse marginal is totally occluded but fills by collaterals from the diagonal artery. LAD is totally occluded in the midsegment. Long . [place] is dyskinetic.     Echo showed an EF of 30 to 35% with mild to moderate mitral insufficiency and mild mitral stenosis. There was a small pericardial effusion            12/31  HD initiated, hypotensive during, bp rx adjusted, nephro appreciated. Denies cp or sob. Spo2 99% room air. Cardio input noted, cardiac cath anticipated Monday anticipating improved volume status. No new problems.         Problem List:  Problem List as of 1/3/2022 Date Reviewed: 12/26/2021          Codes Class Noted - Resolved    * (Principal) NSTEMI (non-ST elevated myocardial infarction) ([facility] [address] ICD-10-CM: I21.4  ICD-9-CM: 410.70  12/26/2021 - Present        HTN (hypertension) ICD-10-CM: I10  ICD-9-CM: 401.9  12/26/2021 - Present        HLD (hyperlipidemia) ICD-10-CM: E78.5  ICD-9-CM: 272.4  12/26/2021 - Present        Edema ICD-10-CM: R60.9  ICD-9-CM: 782.3  12/26/2021 - Present        Type 2 diabetes mellitus, with long-term current use of insulin (HCC) ICD-10-CM: E11.9, Z79.4  ICD-9-CM: 250.00, V58.67  12/26/2021 - Present        CKD (chronic kidney disease) stage 5, GFR less than 15 ml/min (HCC) ICD-10-CM: N18.5  ICD-9-CM: 585.5  12/26/2021 - Present        Anemia ICD-10-CM: D64.9  ICD-9-CM: 285.9  12/26/2021 - Present        Acquired absence of kidney ICD-10-CM: Z90.5  ICD-9-CM: V45.73  12/26/2021 - Present              Medications reviewed  Current Facility-Administered Medications   Medication Dose Route Frequency    [START ON 1/4/2022] iron sucrose (VENOFER) injection 100 mg  100 mg IntraVENous ONCE    potassium chloride (K-DUR, KLOR-CON M20) SR tablet 40 mEq  40 mEq Oral DAILY    heparin (porcine) 1,000 unit/mL injection 2,200 Units  2,200 Units Hemodialysis DIALYSIS PRN    cinacalcet (SENSIPAR) tablet 30 mg  30 mg Oral QHS    epoetin ko-epbx (RETACRIT) injection 10,000 Units  10,000 Units SubCUTAneous Q MON, WED & FRI    glucose chewable tablet 16 g  4 Tablet Oral PRN    dextrose (D50W) injection syrg 12.5-25 g  25-50 mL IntraVENous PRN    glucagon (GLUCAGEN) injection 1 mg  1 mg IntraMUSCular PRN    sodium chloride (NS) flush 5-40 mL  5-40 mL IntraVENous Q8H    sodium chloride (NS) flush 5-40 mL  5-40 mL IntraVENous PRN    insulin glargine (LANTUS) injection 25 Units  25 Units SubCUTAneous QHS    insulin lispro (HUMALOG) injection   SubCUTAneous AC&HS    acetaminophen (TYLENOL) tablet 650 mg  650 mg Oral Q4H PRN    0.9% sodium chloride infusion 250 mL  250 mL IntraVENous PRN    aspirin chewable tablet 81 mg  81 mg Oral DAILY    cetirizine (ZYRTEC) tablet 10 mg  10 mg Oral DAILY    clopidogreL (PLAVIX) tablet 75 mg  75 mg Oral DAILY    metoprolol succinate (TOPROL-XL) XL tablet 50 mg  50 mg Oral DAILY    nitroglycerin (NITROSTAT) tablet 0.4 mg  0.4 mg SubLINGual Q5MIN PRN    ranolazine ER (RANEXA) tablet 500 mg  500 mg Oral BID    atorvastatin (LIPITOR) tablet 40 mg  40 mg Oral QHS    cholecalciferol (VITAMIN D3) (1000 Units /25 mcg) tablet 1,000 Units  1,000 Units Oral DAILY    ondansetron (ZOFRAN) injection 4 mg  4 mg IntraVENous Q6H PRN       Review of Systems:   A comprehensive review of systems was negative except for that written in the HPI. Objective:   Physical Exam:     Visit Vitals  BP (!) 158/71 (BP 1 Location: Right upper arm, BP Patient Position: Supine)   Pulse 76   Temp 98.2 °F (36.8 °C)   Resp 15   Ht 4' 11\" (1.499 m)   Wt 81.2 kg (179 lb 0.2 oz)   SpO2 99%   BMI 36.16 kg/m²    O2 Flow Rate (L/min): 1 l/min O2 Device: None (Room air)    Temp (24hrs), Av.2 °F (36.8 °C), Min:98.1 °F (36.7 °C), Max:98.5 °F (36.9 °C)    No intake/output data recorded.  1901 -  0700  In: 720 [P.O.:720]  Out: -     General:   Awake and alert   Lungs:    Faint creps bases. Not labored. Chest wall:  No tenderness or deformity. Heart:  Regular rate and rhythm, S1, S2 normal, no murmur, click, rub or gallop. Abdomen:   Soft, non-tender. Bowel sounds normal. No masses,  No organomegaly.    Extremities: Extremities normal, atraumatic, no cyanosis 3+ pitting edema. Pulses: 2+ and symmetric all extremities. Skin: Skin color, texture, turgor normal. No rashes or lesions   Neurologic: CNII-XII intact. No gross focal deficits         Data Review:       Recent Days:  Recent Labs     01/02/22  1527   WBC 18.1*   HGB 8.1*   HCT 25.9*        Recent Labs     01/02/22  1527 01/01/22  0834   * 130*   K 3.6 3.5   CL 95* 96*   CO2 27 22   * 132*   BUN 27* 57*   CREA 3.21* 5.25*   CA 8.7 7.4*   PHOS 2.2* 2.3*   ALB 3.2* 3.3*     No results for input(s): PH, PCO2, PO2, HCO3, FIO2 in the last 72 hours. 24 Hour Results:  Recent Results (from the past 24 hour(s))   GLUCOSE, POC    Collection Time: 01/02/22  8:20 PM   Result Value Ref Range    Glucose (POC) 156 (H) 65 - 117 mg/dL    Performed by Joshua Mcqueen    GLUCOSE, POC    Collection Time: 01/03/22  8:46 AM   Result Value Ref Range    Glucose (POC) 117 65 - 117 mg/dL    Performed by [place] Killer (TVLR)    GLUCOSE, POC    Collection Time: 01/03/22 12:08 PM   Result Value Ref Range    Glucose (POC) 143 (H) 65 - 117 mg/dL    Performed by Loida Mondragon        XR CHEST PORT   Final Result   FINDINGS: IMPRESSION: Single frontal view of the chest. Interval placement right   dialysis catheter distal tip SVC/RA junction region. No pneumothorax. Median sternotomy status post CABG. Coronary artery stenting. Unchanged mild   cardiomegaly. No vascular congestion or pulmonary edema. The lungs are well inflated. No infiltrate, pleural effusion, lobar   consolidation. US RETROPERITONEUM COMP   Final Result   Echogenic right kidney consistent with medical renal disease. No   right-sided hydronephrosis. Right renal cysts. Reported left nephrectomy. Assessment:  Acute non-STEMI.   S/p cardiac cath with totally occluded grafts, extensive cad    Acute on chronic HFrEF    Chronic kidney disease stage V.  Started hemodialysis 12/31, temp cath placed, perm before dc, cm working on chair - home in [place]. Unilateral kidney with history of nephrectomy for renal cell carcinoma. No rt hydronephrosis    History of renal artery stenosis of solitary right kidney    Anemia of chronic kidney disease likely, got 1 unit prbc in ed, hgb relatively stable ~ 8.1    Coronary artery disease with history of CABG and PCI. Diabetes mellitus type 2. A1c 5.6. Pta on 44 units lantus, decreased here to 25 units qhs + ssi. Blood sugars remain adequate. Hypertension: Hypotension with HD. 12/31:-Discontinued amlodipine Imdur and hydralazine. continue metoprololxl 50 mg tDaily. Hyperlipidemia on Atorvastatin    Peripheral vascular disease with history of left SFA stenosis    Hypokalemia repleted    Plan:  HD per nephrology  Cm working on hd chair in [place]- will determine when can dc. Continue current lantus, ssi & hypoglycemia protocol. Am labs pending, monitor k as remains on 40 meq kcl daily. vtep mechanical  Full code    Care Plan discussed with: Patient/Family, consultant. Disposition: Continued inpatient care, hd initiated, cm working on chair in [place]. Cardiac cath anticipated Monday. Total time spent with patient: 30 minutes.     Jessica Cherry MD

## 2022-01-03 NOTE — PROGRESS NOTES
Progress Note    Patient: Des Joshi MRN: [id number]  SSN: [id number]    YOB: 1950  Age: 66 y.o. Sex: female      Admit Date: 12/26/2021    LOS: 8 days     Subjective:     No acute events overnight. Did not have any chest pain but she continued to have intermittent shortness of breath. Objective:     Vitals:    01/02/22 1509 01/02/22 1946 01/03/22 0551 01/03/22 0657   BP: 133/69 (!) 134/57 (!) 146/70 (!) 153/62   Pulse: 88 79 75 78   Resp: 18 18 18 20   Temp: 98.3 °F (36.8 °C) 98.5 °F (36.9 °C) 98.1 °F (36.7 °C) 98.2 °F (36.8 °C)   TempSrc:       SpO2: 100% 100% 100% 100%   Weight:       Height:            Intake and Output:  Current Shift: No intake/output data recorded. Last three shifts: 01/01 1901 - 01/03 0700  In: 5 [P.O.:720]  Out: -     Physical Exam:   General:  Alert, cooperative, no distress, appears stated age. Eyes:  Conjunctivae/corneas clear. PERRL, EOMs intact. Fundi benign   Ears:  Normal TMs and external ear canals both ears. Nose: Nares normal. Septum midline. Mucosa normal. No drainage or sinus tenderness. Mouth/Throat: Lips, mucosa, and tongue normal. Teeth and gums normal.   Neck: Supple, symmetrical, trachea midline, no adenopathy, thyroid: no enlargment/tenderness/nodules, no carotid bruit and no JVD. Back:   Symmetric, no curvature. ROM normal. No CVA tenderness. Lungs:   Clear to auscultation bilaterally. Heart:  Regular rate and rhythm, S1, S2 normal, no murmur, click, rub or gallop. Abdomen:   Soft, non-tender. Bowel sounds normal. No masses,  No organomegaly. Extremities: Extremities normal, atraumatic, no cyanosis or edema. Pulses: 2+ and symmetric all extremities. Skin: Skin color, texture, turgor normal. No rashes or lesions   Lymph nodes: Cervical, supraclavicular, and axillary nodes normal.   Neurologic: CNII-XII intact. Normal strength, sensation and reflexes throughout. Lab/Data Review:   All lab results for the last 24 hours reviewed. Assessment:     Principal Problem:    NSTEMI (non-ST elevated myocardial infarction) ([facility] (12/26/2021)    Active Problems:    HTN (hypertension) (12/26/2021)      HLD (hyperlipidemia) (12/26/2021)      Edema (12/26/2021)      Type 2 diabetes mellitus, with long-term current use of insulin [facility] (12/26/2021)      CKD (chronic kidney disease) stage 5, GFR less than 15 ml/min ([facility]) (12/26/2021)      Anemia (12/26/2021)      Acquired absence of kidney (12/26/2021)         Patient is a 76-year-old -American female with:  1. Non-STEMI  2. Heart failure with decompensation  3. Anemia  4. Status post nephrectomy for renal cell carcinoma  5. Peripheral vascular disease status post renal artery stenting  6. Diabetes mellitus  7. Hyperlipidemia  8. Hypertension  9. Hypokalemia  Plan:     Patient has been started on dialysis. She had a dialysis on 2 January were 1.5 L removed. She is on a Tuesday Thursday Saturday schedule now. Hemoglobin of 8.1 as of yesterday. White count has increased. Potassium 3.6, creatinine 3.2. Currently on aspirin, atorvastatin, Plavix, metoprolol and ranolazine. She has a history of CABG 2001 and then PCI 4 years ago. I have discussed with the patient and her family the indication, alternative, risk benefits and complications of left heart catheterization, coronary angiogram and possible PCI and she verbalized understanding and agreed to proceed with the procedure. Her echo showed EF of 30 to 35%. Moderate TR. Mild mitral stenosis, mild to moderate MR. Addendum:  Patient underwent cardiac catheterization via left radial artery. Totally occluded grafts. Extensively stented venous graft to the circumflex that is totally occluded. RCA is extensively stented as well that is totally occluded proximally and reconstitutes after the crux by collaterals from left circumflex artery.   First obtuse marginal is totally occluded but fills by collaterals from the diagonal artery. LAD is totally occluded in the midsegment. Long . [place] is dyskinetic. Patient presentation was heart failure probably multifactorial but mainly secondary to advanced kidney disease. She denied having any chest pain. The goal for now is aggressive medical management with volume management by nephrology and by uptitrating her guideline directed medical therapy. If she have any chest pain then she will require a viability study prior to intervening on the LAD.       Signed By: Fabio Garnica MD     January 3, 2022

## 2022-01-04 LAB
ALBUMIN SERPL-MCNC: 3 G/DL (ref 3.5–5)
ANION GAP SERPL CALC-SCNC: 9 MMOL/L (ref 5–15)
BASOPHILS # BLD: 0 K/UL (ref 0–0.1)
BASOPHILS NFR BLD: 0 % (ref 0–1)
BUN SERPL-MCNC: 41 MG/DL (ref 6–20)
BUN/CREAT SERPL: 8 (ref 12–20)
CA-I BLD-MCNC: 8.3 MG/DL (ref 8.5–10.1)
CHLORIDE SERPL-SCNC: 96 MMOL/L (ref 97–108)
CO2 SERPL-SCNC: 26 MMOL/L (ref 21–32)
CREAT SERPL-MCNC: 5.22 MG/DL (ref 0.55–1.02)
DIFFERENTIAL METHOD BLD: ABNORMAL
EOSINOPHIL # BLD: 0.1 K/UL (ref 0–0.4)
EOSINOPHIL NFR BLD: 1 % (ref 0–7)
ERYTHROCYTE [DISTWIDTH] IN BLOOD BY AUTOMATED COUNT: 17.2 % (ref 11.5–14.5)
GLUCOSE BLD STRIP.AUTO-MCNC: 102 MG/DL (ref 65–117)
GLUCOSE BLD STRIP.AUTO-MCNC: 102 MG/DL (ref 65–117)
GLUCOSE BLD STRIP.AUTO-MCNC: 119 MG/DL (ref 65–117)
GLUCOSE BLD STRIP.AUTO-MCNC: 99 MG/DL (ref 65–117)
GLUCOSE SERPL-MCNC: 113 MG/DL (ref 65–100)
HBV SURFACE AB SER QL: NONREACTIVE
HBV SURFACE AB SER-ACNC: <3.1 MIU/ML
HBV SURFACE AG SER QL: 0.12 INDEX
HBV SURFACE AG SER QL: NEGATIVE
HCT VFR BLD AUTO: 23 % (ref 35–47)
HGB BLD-MCNC: 7.1 G/DL (ref 11.5–16)
IMM GRANULOCYTES # BLD AUTO: 0.1 K/UL (ref 0–0.04)
IMM GRANULOCYTES NFR BLD AUTO: 1 % (ref 0–0.5)
LYMPHOCYTES # BLD: 1.8 K/UL (ref 0.8–3.5)
LYMPHOCYTES NFR BLD: 16 % (ref 12–49)
MCH RBC QN AUTO: 24.2 PG (ref 26–34)
MCHC RBC AUTO-ENTMCNC: 30.9 G/DL (ref 30–36.5)
MCV RBC AUTO: 78.5 FL (ref 80–99)
MONOCYTES # BLD: 0.7 K/UL (ref 0–1)
MONOCYTES NFR BLD: 7 % (ref 5–13)
NEUTS SEG # BLD: 8.5 K/UL (ref 1.8–8)
NEUTS SEG NFR BLD: 75 % (ref 32–75)
NRBC # BLD: 0 K/UL (ref 0–0.01)
NRBC BLD-RTO: 0 PER 100 WBC
PERFORMED BY, TECHID: ABNORMAL
PERFORMED BY, TECHID: NORMAL
PHOSPHATE SERPL-MCNC: 4.2 MG/DL (ref 2.6–4.7)
PLATELET # BLD AUTO: 283 K/UL (ref 150–400)
PMV BLD AUTO: 11.3 FL (ref 8.9–12.9)
POTASSIUM SERPL-SCNC: 4.1 MMOL/L (ref 3.5–5.1)
RBC # BLD AUTO: 2.93 M/UL (ref 3.8–5.2)
SODIUM SERPL-SCNC: 131 MMOL/L (ref 136–145)
WBC # BLD AUTO: 11.3 K/UL (ref 3.6–11)

## 2022-01-04 PROCEDURE — 82962 GLUCOSE BLOOD TEST: CPT

## 2022-01-04 PROCEDURE — 90935 HEMODIALYSIS ONE EVALUATION: CPT

## 2022-01-04 PROCEDURE — 74011250637 HC RX REV CODE- 250/637: Performed by: HOSPITALIST

## 2022-01-04 PROCEDURE — 74011000250 HC RX REV CODE- 250: Performed by: HOSPITALIST

## 2022-01-04 PROCEDURE — 86706 HEP B SURFACE ANTIBODY: CPT

## 2022-01-04 PROCEDURE — 74011250636 HC RX REV CODE- 250/636: Performed by: INTERNAL MEDICINE

## 2022-01-04 PROCEDURE — 80069 RENAL FUNCTION PANEL: CPT

## 2022-01-04 PROCEDURE — 74011636637 HC RX REV CODE- 636/637: Performed by: INTERNAL MEDICINE

## 2022-01-04 PROCEDURE — 86704 HEP B CORE ANTIBODY TOTAL: CPT

## 2022-01-04 PROCEDURE — 65270000029 HC RM PRIVATE

## 2022-01-04 PROCEDURE — 87340 HEPATITIS B SURFACE AG IA: CPT

## 2022-01-04 PROCEDURE — 36415 COLL VENOUS BLD VENIPUNCTURE: CPT

## 2022-01-04 PROCEDURE — 74011250637 HC RX REV CODE- 250/637: Performed by: INTERNAL MEDICINE

## 2022-01-04 PROCEDURE — 85025 COMPLETE CBC W/AUTO DIFF WBC: CPT

## 2022-01-04 RX ORDER — HEPARIN SODIUM 1000 [USP'U]/ML
INJECTION, SOLUTION INTRAVENOUS; SUBCUTANEOUS
Status: DISPENSED
Start: 2022-01-04 | End: 2022-01-04

## 2022-01-04 RX ORDER — INSULIN GLARGINE 100 [IU]/ML
22 INJECTION, SOLUTION SUBCUTANEOUS
Status: DISCONTINUED | OUTPATIENT
Start: 2022-01-04 | End: 2022-01-05

## 2022-01-04 RX ADMIN — SODIUM CHLORIDE, PRESERVATIVE FREE 10 ML: 5 INJECTION INTRAVENOUS at 22:58

## 2022-01-04 RX ADMIN — CETIRIZINE HYDROCHLORIDE 10 MG: 10 TABLET, FILM COATED ORAL at 12:00

## 2022-01-04 RX ADMIN — Medication 1000 UNITS: at 12:00

## 2022-01-04 RX ADMIN — ATORVASTATIN CALCIUM 40 MG: 40 TABLET, FILM COATED ORAL at 22:57

## 2022-01-04 RX ADMIN — IRON SUCROSE 100 MG: 20 INJECTION, SOLUTION INTRAVENOUS at 12:01

## 2022-01-04 RX ADMIN — INSULIN GLARGINE 22 UNITS: 100 INJECTION, SOLUTION SUBCUTANEOUS at 22:57

## 2022-01-04 RX ADMIN — RANOLAZINE 500 MG: 500 TABLET, FILM COATED, EXTENDED RELEASE ORAL at 22:56

## 2022-01-04 RX ADMIN — ASPIRIN 81 MG CHEWABLE TABLET 81 MG: 81 TABLET CHEWABLE at 12:00

## 2022-01-04 RX ADMIN — SODIUM CHLORIDE, PRESERVATIVE FREE 10 ML: 5 INJECTION INTRAVENOUS at 14:42

## 2022-01-04 RX ADMIN — HEPARIN SODIUM 2200 UNITS: 1000 INJECTION, SOLUTION INTRAVENOUS; SUBCUTANEOUS at 09:51

## 2022-01-04 RX ADMIN — METOPROLOL SUCCINATE 50 MG: 50 TABLET, EXTENDED RELEASE ORAL at 12:00

## 2022-01-04 RX ADMIN — CINACALCET 30 MG: 30 TABLET, FILM COATED ORAL at 22:57

## 2022-01-04 RX ADMIN — CLOPIDOGREL BISULFATE 75 MG: 75 TABLET ORAL at 12:00

## 2022-01-04 RX ADMIN — SODIUM CHLORIDE, PRESERVATIVE FREE 10 ML: 5 INJECTION INTRAVENOUS at 05:03

## 2022-01-04 NOTE — PROGRESS NOTES
Patient: Koko Meredith MRN: [id number]  SSN: [id number]    YOB: 1950  Age: 66 y.o. Sex: female          Subjective:     Seen while she was getting HD   Patient c/o headache and toungue numbness   Speech is good     Her Temp HD catheter arterial pressures were low     Eager to go home     Objective:     Vitals:    01/04/22 0920 01/04/22 0950 01/04/22 1020 01/04/22 1050   BP: (!) 160/62 (!) 148/68 139/77 (!) 142/78   Pulse: 79 (!) 103 84 91   Resp:       Temp:       TempSrc:       SpO2:       Weight:       Height:            Intake and Output:  Yesterday's Intake and Output:  No intake/output data recorded. Physical Exam:   GENERAL: alert, cooperative, no distress, appears stated age  EYE: negative  Respiratory - in no distress   ABDOMEN: soft, non-tender. Bowel sounds normal. No masses,  no organomegaly  EXTREMITIES:  extremities normal, atraumatic, no cyanosis or edema, no ulcers,SKIN: Normal.   Skin -  no rash or abnormalities  NEUROLOGIC: negative      Dialysis access: temporary catheter site right and IJ .     Data Review    Meds    Current Facility-Administered Medications   Medication Dose Route Frequency    iron sucrose (VENOFER) injection 100 mg  100 mg IntraVENous ONCE    potassium chloride (K-DUR, KLOR-CON M20) SR tablet 40 mEq  40 mEq Oral DAILY    heparin (porcine) 1,000 unit/mL injection 2,200 Units  2,200 Units Hemodialysis DIALYSIS PRN    cinacalcet (SENSIPAR) tablet 30 mg  30 mg Oral QHS    epoetin ko-epbx (RETACRIT) injection 10,000 Units  10,000 Units SubCUTAneous Q MON, WED & FRI    glucose chewable tablet 16 g  4 Tablet Oral PRN    dextrose (D50W) injection syrg 12.5-25 g  25-50 mL IntraVENous PRN    glucagon (GLUCAGEN) injection 1 mg  1 mg IntraMUSCular PRN    sodium chloride (NS) flush 5-40 mL  5-40 mL IntraVENous Q8H    sodium chloride (NS) flush 5-40 mL  5-40 mL IntraVENous PRN    insulin glargine (LANTUS) injection 25 Units  25 Units SubCUTAneous QHS  insulin lispro (HUMALOG) injection   SubCUTAneous AC&HS    acetaminophen (TYLENOL) tablet 650 mg  650 mg Oral Q4H PRN    0.9% sodium chloride infusion 250 mL  250 mL IntraVENous PRN    aspirin chewable tablet 81 mg  81 mg Oral DAILY    cetirizine (ZYRTEC) tablet 10 mg  10 mg Oral DAILY    clopidogreL (PLAVIX) tablet 75 mg  75 mg Oral DAILY    metoprolol succinate (TOPROL-XL) XL tablet 50 mg  50 mg Oral DAILY    nitroglycerin (NITROSTAT) tablet 0.4 mg  0.4 mg SubLINGual Q5MIN PRN    ranolazine ER (RANEXA) tablet 500 mg  500 mg Oral BID    atorvastatin (LIPITOR) tablet 40 mg  40 mg Oral QHS    cholecalciferol (VITAMIN D3) (1000 Units /25 mcg) tablet 1,000 Units  1,000 Units Oral DAILY    ondansetron (ZOFRAN) injection 4 mg  4 mg IntraVENous Q6H PRN       Lab      Recent Results (from the past 24 hour(s))   GLUCOSE, POC    Collection Time: 01/03/22 12:08 PM   Result Value Ref Range    Glucose (POC) 143 (H) 65 - 117 mg/dL    Performed by Valarie Osorio    GLUCOSE, POC    Collection Time: 01/03/22  5:29 PM   Result Value Ref Range    Glucose (POC) 114 65 - 117 mg/dL    Performed by MORENITA DEL VALLE    GLUCOSE, POC    Collection Time: 01/03/22  8:02 PM   Result Value Ref Range    Glucose (POC) 213 (H) 65 - 117 mg/dL    Performed by Jyoti Goncalves    GLUCOSE, POC    Collection Time: 01/04/22  7:43 AM   Result Value Ref Range    Glucose (POC) 102 65 - 117 mg/dL    Performed by Franny Edwards    RENAL FUNCTION PANEL    Collection Time: 01/04/22  8:45 AM   Result Value Ref Range    Sodium 131 (L) 136 - 145 mmol/L    Potassium 4.1 3.5 - 5.1 mmol/L    Chloride 96 (L) 97 - 108 mmol/L    CO2 26 21 - 32 mmol/L    Anion gap 9 5 - 15 mmol/L    Glucose 113 (H) 65 - 100 mg/dL    BUN 41 (H) 6 - 20 mg/dL    Creatinine 5.22 (H) 0.55 - 1.02 mg/dL    BUN/Creatinine ratio 8 (L) 12 - 20      GFR est AA 10 (L) >60 ml/min/1.73m2    GFR est non-AA 8 (L) >60 ml/min/1.73m2    Calcium 8.3 (L) 8.5 - 10.1 mg/dL    Phosphorus 4.2 2.6 - 4.7 mg/dL    Albumin 3.0 (L) 3.5 - 5.0 g/dL   CBC WITH AUTOMATED DIFF    Collection Time: 01/04/22  8:45 AM   Result Value Ref Range    WBC 11.3 (H) 3.6 - 11.0 K/uL    RBC 2.93 (L) 3.80 - 5.20 M/uL    HGB 7.1 (L) 11.5 - 16.0 g/dL    HCT 23.0 (L) 35.0 - 47.0 %    MCV 78.5 (L) 80.0 - 99.0 FL    MCH 24.2 (L) 26.0 - 34.0 PG    MCHC 30.9 30.0 - 36.5 g/dL    RDW 17.2 (H) 11.5 - 14.5 %    PLATELET 805 787 - 823 K/uL    MPV 11.3 8.9 - 12.9 FL    NRBC 0.0 0.0  WBC    ABSOLUTE NRBC 0.00 0.00 - 0.01 K/uL    NEUTROPHILS 75 32 - 75 %    LYMPHOCYTES 16 12 - 49 %    MONOCYTES 7 5 - 13 %    EOSINOPHILS 1 0 - 7 %    BASOPHILS 0 0 - 1 %    IMMATURE GRANULOCYTES 1 (H) 0 - 0.5 %    ABS. NEUTROPHILS 8.5 (H) 1.8 - 8.0 K/UL    ABS. LYMPHOCYTES 1.8 0.8 - 3.5 K/UL    ABS. MONOCYTES 0.7 0.0 - 1.0 K/UL    ABS. EOSINOPHILS 0.1 0.0 - 0.4 K/UL    ABS. BASOPHILS 0.0 0.0 - 0.1 K/UL    ABS. IMM.  GRANS. 0.1 (H) 0.00 - 0.04 K/UL    DF AUTOMATED          Lab Results   Component Value Date/Time    Color Yellow/Straw 12/26/2021 11:24 PM    Appearance Clear 12/26/2021 11:24 PM    Specific gravity 1.011 12/26/2021 11:24 PM    pH (UA) 5.0 12/26/2021 11:24 PM    Protein 100 (A) 12/26/2021 11:24 PM    Glucose Negative 12/26/2021 11:24 PM    Ketone Negative 12/26/2021 11:24 PM    Bilirubin Negative 12/26/2021 11:24 PM    Urobilinogen 0.1 12/26/2021 11:24 PM    Nitrites Negative 12/26/2021 11:24 PM    Leukocyte Esterase Trace (A) 12/26/2021 11:24 PM    Bacteria Negative 12/26/2021 11:24 PM    WBC 0-4 12/26/2021 11:24 PM    RBC 0-5 12/26/2021 11:24 PM       Imaging         Assessment & Plan:     Principal Problem:    NSTEMI (non-ST elevated myocardial infarction) ([facility] (12/26/2021)    Active Problems:    HTN (hypertension) (12/26/2021)      HLD (hyperlipidemia) (12/26/2021)      Edema (12/26/2021)      Type 2 diabetes mellitus, with long-term current use of insulin ([facility] (12/26/2021)      CKD (chronic kidney disease) stage 5, GFR less than 15 ml/min ([facility] (12/26/2021)      Anemia (12/26/2021)      Acquired absence of kidney (12/26/2021)    1- JIM on possibly advanced CKD stage unknown:   -Since admission Cr 5.7->6.9 and BUN 80  -Unknown baseline.   -she probably had advanced CKD due to which her nephrologist [place] had discussed about starting dialysis with her.  -Renal US showed No right-sided hydronephrosis.  s/p left nephrectomy.  -Clinically volume overload, no uremia  - temporary dialysis catheter will be changed to permanent catheter. Consulted IR  -I have already spoken with the  to set up her dialysis chair close to her home in [place].  -She had her first dialysis 12/31.  -s/p HD 1/02, removed 1.5 L  -will keep TTS schedule      2-Hypokalemia:  -from loops and thiazides  -will DC metolazone and lasix  -will resume KCL 40 meq daily as K was  3.1. Annabella Helms -will use 3K bath.      3-Anemia:  -likely due to advanced CKD and iron def   -Received 1 unit of PRBC in the ED. -started on Epo iv with HD   Will also give IV venofer with HD      4-NSTEMI   -started on heparin drip.  -Echo LVEF 30-35%  S/p Cath on 1/3/21- has totally occluded grafts      5-Hypertension:  -blood pressure currently better controlled. -Patient became hypotensive during dialysis  -Discontinue amlodipine Imdur and hydralazine.  -I will continue metoprolol 25 mg twice a day.     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   Please contact the author for any clarifications

## 2022-01-04 NOTE — PROGRESS NOTES
CM reviewed clinical chart. Patient will need hemodialysis chair near her home in [place]. CM searched for closest location to her home. Referral has been sent to  [address] [facility] [place]. CM has ordered Acute Hep Panel to include Hep B AG, Hep B AB and Total Core AB. CM awaiting confirmation of receipt of referral from HD center, CM team will follow up daily.

## 2022-01-04 NOTE — DIALYSIS
Pt ended dialysis one hour and ten minutes early, of 3.5 hour hemodialysis treatment. Dr Candi Thompson seen pt on dialysis, pt complained to MD of 'tongue feeling like it has needles in it,' and of headache. No new orders received. 1.3 liters net fluid removed. Difficulty noted with right temp dialysis catheter, high arterial pressure at intervals.

## 2022-01-04 NOTE — PROGRESS NOTES
Hospitalist Progress Note               Daily Progress Note: 1/4/2022      Subjective: The patient is seen for follow up. Is a 79-year-old female with a history of coronary artery disease with CABG in 2001 and PCI as well as non-STEMI, stage V chronic kidney disease with a unilateral kidney, diabetes, history of nephrectomy for renal cell carcinoma who came to the ED late last night with complaints of shortness of breath and left-sided chest pain. Also with lower extremity edema. In the ED she was hypertensive with otherwise stable vital signs. Labs showed a hemoglobin of 7.1, potassium 2.9, creatinine 5.5 which is her baseline. Initial troponin was 876 with a repeat of 1068. Patient is from [place] and previously received all her care at [facility] but she went to [facility] instead because she did not trust the other hospital    Patient is normally followed by a cardiologist in [place]. Her nephrologist has previously recommended that she start dialysis but patient has refused    Her troponin peaked at 1191    Renal US showed No right-sided hydronephrosis.  s/p left nephrectomy. Clinically volume overload, no uremia. Patient was initially refusing consideration of hemodialysis but after further discussion with nephrology, she is agreeable. Temporary dialysis catheter which we can eventually exchange to permanent catheter. IR consulted   to set up her dialysis chair close to her home in [place]. Cardiac vath 1/3/22:via left radial artery. Totally occluded grafts. Extensively stented venous graft to the circumflex that is totally occluded. RCA is extensively stented as well that is totally occluded proximally and reconstitutes after the crux by collaterals from left circumflex artery. First obtuse marginal is totally occluded but fills by collaterals from the diagonal artery. LAD is totally occluded in the midsegment. Long . [place] is dyskinetic.     Echo showed an EF of 30 to 35% with mild to moderate mitral insufficiency and mild mitral stenosis. There was a small pericardial effusion        1/4  Alert nadd,  Wants to go home,  Reportedly stated intent to leave earlier, agrees now to stay    HD:Pt ended dialysis one hour and ten minutes early, of 3.5 hour hemodialysis treatment. Dr Pallavi Dominguez seen pt on dialysis, pt complained to MD of 'tongue feeling like it has needles in it,' and of headache. 1.3 liters net fluid removed. Difficulty noted with right temp dialysis catheter, high arterial pressure at intervals. Hypotensive during hd, bp on telemetry has remained adequate. No cp.    hgb 7.1<--8.1  12/31  HD initiated, hypotensive during, bp rx adjusted, nephro appreciated. Denies cp or sob. Spo2 99% room air. Cardio input noted, cardiac cath anticipated Monday anticipating improved volume status. No new problems.         Problem List:  Problem List as of 1/4/2022 Date Reviewed: 12/26/2021          Codes Class Noted - Resolved    * (Principal) NSTEMI (non-ST elevated myocardial infarction) ([facility] ICD-10-CM: I21.4  ICD-9-CM: 410.70  12/26/2021 - Present        HTN (hypertension) ICD-10-CM: I10  ICD-9-CM: 401.9  12/26/2021 - Present        HLD (hyperlipidemia) ICD-10-CM: E78.5  ICD-9-CM: 272.4  12/26/2021 - Present        Edema ICD-10-CM: R60.9  ICD-9-CM: 782.3  12/26/2021 - Present        Type 2 diabetes mellitus, with long-term current use of insulin ([facility] ICD-10-CM: E11.9, Z79.4  ICD-9-CM: 250.00, V58.67  12/26/2021 - Present        CKD (chronic kidney disease) stage 5, GFR less than 15 ml/min ([facility]) ICD-10-CM: N18.5  ICD-9-CM: 585.5  12/26/2021 - Present        Anemia ICD-10-CM: D64.9  ICD-9-CM: 285.9  12/26/2021 - Present        Acquired absence of kidney ICD-10-CM: Z90.5  ICD-9-CM: V45.73  12/26/2021 - Present              Medications reviewed  Current Facility-Administered Medications   Medication Dose Route Frequency    heparin (porcine) 1,000 unit/mL injection        potassium chloride (K-DUR, KLOR-CON M20) SR tablet 40 mEq  40 mEq Oral DAILY    heparin (porcine) 1,000 unit/mL injection 2,200 Units  2,200 Units Hemodialysis DIALYSIS PRN    cinacalcet (SENSIPAR) tablet 30 mg  30 mg Oral QHS    epoetin ko-epbx (RETACRIT) injection 10,000 Units  10,000 Units SubCUTAneous Q MON, WED & FRI    glucose chewable tablet 16 g  4 Tablet Oral PRN    dextrose (D50W) injection syrg 12.5-25 g  25-50 mL IntraVENous PRN    glucagon (GLUCAGEN) injection 1 mg  1 mg IntraMUSCular PRN    sodium chloride (NS) flush 5-40 mL  5-40 mL IntraVENous Q8H    sodium chloride (NS) flush 5-40 mL  5-40 mL IntraVENous PRN    insulin glargine (LANTUS) injection 25 Units  25 Units SubCUTAneous QHS    insulin lispro (HUMALOG) injection   SubCUTAneous AC&HS    acetaminophen (TYLENOL) tablet 650 mg  650 mg Oral Q4H PRN    0.9% sodium chloride infusion 250 mL  250 mL IntraVENous PRN    aspirin chewable tablet 81 mg  81 mg Oral DAILY    cetirizine (ZYRTEC) tablet 10 mg  10 mg Oral DAILY    clopidogreL (PLAVIX) tablet 75 mg  75 mg Oral DAILY    metoprolol succinate (TOPROL-XL) XL tablet 50 mg  50 mg Oral DAILY    nitroglycerin (NITROSTAT) tablet 0.4 mg  0.4 mg SubLINGual Q5MIN PRN    ranolazine ER (RANEXA) tablet 500 mg  500 mg Oral BID    atorvastatin (LIPITOR) tablet 40 mg  40 mg Oral QHS    cholecalciferol (VITAMIN D3) (1000 Units /25 mcg) tablet 1,000 Units  1,000 Units Oral DAILY    ondansetron (ZOFRAN) injection 4 mg  4 mg IntraVENous Q6H PRN       Review of Systems:   A comprehensive review of systems was negative except for that written in the HPI.     Objective:   Physical Exam:     Visit Vitals  BP (!) 147/57 (BP Patient Position: At rest;Lying left side)   Pulse 77   Temp 98.1 °F (36.7 °C)   Resp 18   Ht 4' 11\" (1.499 m)   Wt 81.2 kg (179 lb 0.2 oz)   SpO2 100%   BMI 36.16 kg/m²    O2 Flow Rate (L/min): 1 l/min O2 Device: None (Room air)    Temp (24hrs), Av °F (36.7 °C), Min:97.6 °F (36.4 °C), Max:98.4 °F (36.9 °C)    01/04 0701 - 01/04 1900  In: -   Out: 1300    No intake/output data recorded. General:   Awake and alert   Lungs:    Faint creps bases. Not labored. Chest wall:  No tenderness or deformity. Heart:  Regular rate and rhythm, S1, S2 normal, no murmur, click, rub or gallop. Abdomen:   Soft, non-tender. Bowel sounds normal. No masses,  No organomegaly. Extremities: Extremities normal, atraumatic, no cyanosis 3+ pitting edema. Pulses: 2+ and symmetric all extremities. Skin: Skin color, texture, turgor normal. No rashes or lesions   Neurologic: CNII-XII intact. No gross focal deficits         Data Review:       Recent Days:  Recent Labs     01/04/22  0845 01/02/22  1527   WBC 11.3* 18.1*   HGB 7.1* 8.1*   HCT 23.0* 25.9*    300     Recent Labs     01/04/22  0845 01/02/22  1527   * 132*   K 4.1 3.6   CL 96* 95*   CO2 26 27   * 155*   BUN 41* 27*   CREA 5.22* 3.21*   CA 8.3* 8.7   PHOS 4.2 2.2*   ALB 3.0* 3.2*     No results for input(s): PH, PCO2, PO2, HCO3, FIO2 in the last 72 hours.     24 Hour Results:  Recent Results (from the past 24 hour(s))   GLUCOSE, POC    Collection Time: 01/03/22  8:02 PM   Result Value Ref Range    Glucose (POC) 213 (H) 65 - 117 mg/dL    Performed by Kerry Tinoco    GLUCOSE, POC    Collection Time: 01/04/22  7:43 AM   Result Value Ref Range    Glucose (POC) 102 65 - 117 mg/dL    Performed by [facility]    RENAL FUNCTION PANEL    Collection Time: 01/04/22  8:45 AM   Result Value Ref Range    Sodium 131 (L) 136 - 145 mmol/L    Potassium 4.1 3.5 - 5.1 mmol/L    Chloride 96 (L) 97 - 108 mmol/L    CO2 26 21 - 32 mmol/L    Anion gap 9 5 - 15 mmol/L    Glucose 113 (H) 65 - 100 mg/dL    BUN 41 (H) 6 - 20 mg/dL    Creatinine 5.22 (H) 0.55 - 1.02 mg/dL    BUN/Creatinine ratio 8 (L) 12 - 20      GFR est AA 10 (L) >60 ml/min/1.73m2    GFR est non-AA 8 (L) >60 ml/min/1.73m2    Calcium 8.3 (L) 8.5 - 10.1 mg/dL Phosphorus 4.2 2.6 - 4.7 mg/dL    Albumin 3.0 (L) 3.5 - 5.0 g/dL   CBC WITH AUTOMATED DIFF    Collection Time: 01/04/22  8:45 AM   Result Value Ref Range    WBC 11.3 (H) 3.6 - 11.0 K/uL    RBC 2.93 (L) 3.80 - 5.20 M/uL    HGB 7.1 (L) 11.5 - 16.0 g/dL    HCT 23.0 (L) 35.0 - 47.0 %    MCV 78.5 (L) 80.0 - 99.0 FL    MCH 24.2 (L) 26.0 - 34.0 PG    MCHC 30.9 30.0 - 36.5 g/dL    RDW 17.2 (H) 11.5 - 14.5 %    PLATELET 385 984 - 784 K/uL    MPV 11.3 8.9 - 12.9 FL    NRBC 0.0 0.0  WBC    ABSOLUTE NRBC 0.00 0.00 - 0.01 K/uL    NEUTROPHILS 75 32 - 75 %    LYMPHOCYTES 16 12 - 49 %    MONOCYTES 7 5 - 13 %    EOSINOPHILS 1 0 - 7 %    BASOPHILS 0 0 - 1 %    IMMATURE GRANULOCYTES 1 (H) 0 - 0.5 %    ABS. NEUTROPHILS 8.5 (H) 1.8 - 8.0 K/UL    ABS. LYMPHOCYTES 1.8 0.8 - 3.5 K/UL    ABS. MONOCYTES 0.7 0.0 - 1.0 K/UL    ABS. EOSINOPHILS 0.1 0.0 - 0.4 K/UL    ABS. BASOPHILS 0.0 0.0 - 0.1 K/UL    ABS. IMM. GRANS. 0.1 (H) 0.00 - 0.04 K/UL    DF AUTOMATED     GLUCOSE, POC    Collection Time: 01/04/22 11:49 AM   Result Value Ref Range    Glucose (POC) 119 (H) 65 - 117 mg/dL    Performed by Kaemron Yang    GLUCOSE, POC    Collection Time: 01/04/22  3:20 PM   Result Value Ref Range    Glucose (POC) 99 65 - 117 mg/dL    Performed by Kameron Yang        XR CHEST PORT   Final Result   FINDINGS: IMPRESSION: Single frontal view of the chest. Interval placement right   dialysis catheter distal tip SVC/RA junction region. No pneumothorax. Median sternotomy status post CABG. Coronary artery stenting. Unchanged mild   cardiomegaly. No vascular congestion or pulmonary edema. The lungs are well inflated. No infiltrate, pleural effusion, lobar   consolidation. US RETROPERITONEUM COMP   Final Result   Echogenic right kidney consistent with medical renal disease. No   right-sided hydronephrosis. Right renal cysts. Reported left nephrectomy. Assessment:  Acute non-STEMI.   S/p cardiac cath with totally occluded grafts, extensive cad    Acute on chronic HFrEF    Chronic kidney disease stage V.  Started hemodialysis 12/31, temp cath placed, perm before dc, cm working on chair - home in [place]. Unilateral kidney with history of nephrectomy for renal cell carcinoma. No rt hydronephrosis    History of renal artery stenosis of solitary right kidney    Anemia of chronic kidney disease likely, got 1 unit prbc in ed, hgb drop to 7.1    Coronary artery disease with history of CABG and PCI. Diabetes mellitus type 2. A1c 5.6. Pta on 44 units lantus, decreased here to 25 units qhs + ssi. Blood sugars remain adequate. Hypertension: Hypotension with HD. 12/31:-Discontinued amlodipine Imdur and hydralazine. continue metoprololxl 50 mg tDaily. Hyperlipidemia on Atorvastatin    Peripheral vascular disease with history of left SFA stenosis    Hypokalemia repleted    Plan:  HD per nephrology tts  Hypotensive with hd, dc amlodipine, imdur, hydralazine,  Continue metoprolol 25 mg bid  Cm working on hd chair in [place]- will determine when can dc. Continue  lantus 22 units, ssi & hypoglycemia protocol. Monitor hgb  IR for switch to perm cath    vtep mechanical  Full code    Care Plan discussed with: Patient/Family, consultant. Disposition: Continued inpatient care, hd initiated, cm working on chair in [place]. Total time spent with patient: 30 minutes.     Raghav Arzola MD

## 2022-01-04 NOTE — PROGRESS NOTES
Progress Note    Patient: Aurelia Cole MRN: [id number]  SSN: [id number]    YOB: 1950  Age: 66 y.o. Sex: female      Admit Date: 12/26/2021    LOS: 9 days     Subjective:     No acute events overnight  Objective:     Vitals:    01/04/22 1050 01/04/22 1110 01/04/22 1147 01/04/22 1208   BP: (!) 142/78 (!) 150/79 133/75    Pulse: 91 84 88    Resp:  18 18    Temp:  98.4 °F (36.9 °C) 98 °F (36.7 °C)    TempSrc:  Oral     SpO2:   98%    Weight:       Height:    4' 11\" (1.499 m)        Intake and Output:  Current Shift: 01/04 0701 - 01/04 1900  In: -   Out: 1300   Last three shifts: No intake/output data recorded. Physical Exam:   General:  Alert, cooperative, no distress, appears stated age. Eyes:  Conjunctivae/corneas clear. PERRL, EOMs intact. Fundi benign   Ears:  Normal TMs and external ear canals both ears. Nose: Nares normal. Septum midline. Mucosa normal. No drainage or sinus tenderness. Mouth/Throat: Lips, mucosa, and tongue normal. Teeth and gums normal.   Neck: Supple, symmetrical, trachea midline, no adenopathy, thyroid: no enlargment/tenderness/nodules, no carotid bruit and no JVD. Back:   Symmetric, no curvature. ROM normal. No CVA tenderness. Lungs:   Clear to auscultation bilaterally. Heart:  Regular rate and rhythm, S1, S2 normal, no murmur, click, rub or gallop. Abdomen:   Soft, non-tender. Bowel sounds normal. No masses,  No organomegaly. Extremities: Extremities normal, atraumatic, no cyanosis or edema. Pulses: 2+ and symmetric all extremities. Skin: Skin color, texture, turgor normal. No rashes or lesions   Lymph nodes: Cervical, supraclavicular, and axillary nodes normal.   Neurologic: CNII-XII intact. Normal strength, sensation and reflexes throughout. Lab/Data Review: All lab results for the last 24 hours reviewed.          Assessment:     Principal Problem:    NSTEMI (non-ST elevated myocardial infarction) ([facility] (12/26/2021)    Active Problems:    HTN (hypertension) (12/26/2021)      HLD (hyperlipidemia) (12/26/2021)      Edema (12/26/2021)      Type 2 diabetes mellitus, with long-term current use of insulin ([facility] (12/26/2021)      CKD (chronic kidney disease) stage 5, GFR less than 15 ml/min ([facility] (12/26/2021)      Anemia (12/26/2021)      Acquired absence of kidney (12/26/2021)         Patient is a 79-year-old -American female with:  1. Non-STEMI  2. Heart failure with decompensation  3. Anemia  4. Status post nephrectomy for renal cell carcinoma  5. Peripheral vascular disease status post renal artery stenting  6. Diabetes mellitus  7. Hyperlipidemia  8. Hypertension  9. Hypokalemia  Plan:     Rhythm has been stable. Denied having any chest pain. Blood pressure has been under better control. Left radial pulses +2 without hematoma. Continue medical management with aspirin, atorvastatin, Plavix and metoprolol, ranolazine. If she has any chest pain then consider viability study and possible PCI of the LAD.       Signed By: Jose Goyal MD     January 4, 2022

## 2022-01-04 NOTE — PROGRESS NOTES
Comprehensive Nutrition Assessment    Type and Reason for Visit: RD nutrition re-screen/LOS    Nutrition Recommendations/Plan:   Modify diet order to Regular, Carbohydrate Control Restriction: 4 carb choices (60 gm/meal)  Sodium Restriction: No Salt Added (3-4 gm)  Phosphorus Restriction: Low Phosphorus (Less than 1000 mg)    Monitor weight, intake, labs and skin changes      Nutrition Assessment:  Admit for worsening SOB, L sided CP associated with Bilateral lower extremity swelling. DX NSTEMI. HD cath placed 12/30. She had first dialysis 12/31, tolerated. S/p HD 1/2 with 1.5L removed. S/P cardiac cath as scheduled. She was noted to have taken <25% at lunch today. But notes a good meal she had yesterday. Preferences discussed. She is from [place] and is awaiting chair placement at dialysis center. no significant weight change noted but likely with dialysis starting and edema present, will see changes. Labs: H/H 7.1/23.0, , K 4.1, BUN/CRE 41/5.22,Glucose range 102-162, phos 4.2, alb 3. 0. Meds: Venofer, KCL, sensipar, Retacrit, lantus, statin, renexa, Vit D3. Malnutrition Assessment:  Malnutrition Status: Moderate malnutrition    Context:  Acute illness     Findings of the 6 clinical characteristics of malnutrition:   Energy Intake:  7 - 50% or less of est energy requirements for 5 or more days  Weight Loss:  No significant weight loss     Body Fat Loss:  No significant body fat loss,     Muscle Mass Loss:  1 - Mild muscle mass loss, Temples (temporalis)  Fluid Accumulation:  7 - Moderate to severe, Extremities        Estimated Daily Nutrient Needs:  Energy (kcal): 1600kcal(30cal/kg ABW); Weight Used for Energy Requirements: Adjusted  Protein (g): 65g (1.1g/kgABW); Weight Used for Protein Requirements: Adjusted  Fluid (ml/day): 1400; Method Used for Fluid Requirements: ml/kg      Nutrition Related Findings:  NFPE with no significant findings No dysphagia, no n/v, no c/d+edema b/l legs. Last BM 1/4. Wounds:    None       Current Nutrition Therapies:  ADULT DIET Regular; 4 carb choices (60 gm/meal); Low Fat/Low Chol/High Fiber/MARCIA; Low Sodium (2 gm)    Anthropometric Measures:  · Height:  4' 11\" (149.9 cm)  · Current Body Wt:  81.2 kg (179 lb 0.2 oz)   · Admission Body Wt:  179 lb 0.2 oz    · Usual Body Wt:  82.7 kg (182 lb 5.1 oz)     · Ideal Body Wt:  95 lbs:  188.4 %   · Adjusted Body Weight:   ; Weight Adjustment for: No adjustment   · Adjusted BMI:       · BMI Category:          Nutrition Diagnosis:   · Predicted inadequate energy intake related to renal dysfunction as evidenced by dialysis      Nutrition Interventions:   Food and/or Nutrient Delivery: Modify current diet  Nutrition Education and Counseling: No recommendations at this time  Coordination of Nutrition Care: Continue to monitor while inpatient    Goals:  Pt will consume >75% EEN's next review.        Nutrition Monitoring and Evaluation:   Behavioral-Environmental Outcomes: None identified  Food/Nutrient Intake Outcomes: Food and nutrient intake  Physical Signs/Symptoms Outcomes: Weight,Fluid status or edema    Discharge Planning:    No discharge needs at this time     Electronically signed by Rochelle Land, [address] [place] [address] on 1/4/2022 at 12:09 PM    Contact: 2016

## 2022-01-05 ENCOUNTER — APPOINTMENT (OUTPATIENT)
Dept: INTERVENTIONAL RADIOLOGY/VASCULAR | Age: 72
DRG: 280 | End: 2022-01-05
Attending: INTERNAL MEDICINE
Payer: MEDICARE

## 2022-01-05 LAB
GLUCOSE BLD STRIP.AUTO-MCNC: 108 MG/DL (ref 65–117)
GLUCOSE BLD STRIP.AUTO-MCNC: 136 MG/DL (ref 65–117)
GLUCOSE BLD STRIP.AUTO-MCNC: 68 MG/DL (ref 65–117)
GLUCOSE BLD STRIP.AUTO-MCNC: 80 MG/DL (ref 65–117)
HBV CORE AB SERPL QL IA: NEGATIVE
PERFORMED BY, TECHID: ABNORMAL
PERFORMED BY, TECHID: NORMAL

## 2022-01-05 PROCEDURE — 74011250636 HC RX REV CODE- 250/636: Performed by: RADIOLOGY

## 2022-01-05 PROCEDURE — 65270000029 HC RM PRIVATE

## 2022-01-05 PROCEDURE — 02H633Z INSERTION OF INFUSION DEVICE INTO RIGHT ATRIUM, PERCUTANEOUS APPROACH: ICD-10-PCS | Performed by: RADIOLOGY

## 2022-01-05 PROCEDURE — 74011250637 HC RX REV CODE- 250/637: Performed by: INTERNAL MEDICINE

## 2022-01-05 PROCEDURE — 02PY33Z REMOVAL OF INFUSION DEVICE FROM GREAT VESSEL, PERCUTANEOUS APPROACH: ICD-10-PCS | Performed by: RADIOLOGY

## 2022-01-05 PROCEDURE — 74011250637 HC RX REV CODE- 250/637: Performed by: HOSPITALIST

## 2022-01-05 PROCEDURE — 0JH63XZ INSERTION OF TUNNELED VASCULAR ACCESS DEVICE INTO CHEST SUBCUTANEOUS TISSUE AND FASCIA, PERCUTANEOUS APPROACH: ICD-10-PCS | Performed by: RADIOLOGY

## 2022-01-05 PROCEDURE — 74011000250 HC RX REV CODE- 250: Performed by: HOSPITALIST

## 2022-01-05 PROCEDURE — 82962 GLUCOSE BLOOD TEST: CPT

## 2022-01-05 PROCEDURE — 74011250636 HC RX REV CODE- 250/636: Performed by: INTERNAL MEDICINE

## 2022-01-05 PROCEDURE — 74011636637 HC RX REV CODE- 636/637: Performed by: INTERNAL MEDICINE

## 2022-01-05 PROCEDURE — 02HV33Z INSERTION OF INFUSION DEVICE INTO SUPERIOR VENA CAVA, PERCUTANEOUS APPROACH: ICD-10-PCS | Performed by: RADIOLOGY

## 2022-01-05 PROCEDURE — 77001 FLUOROGUIDE FOR VEIN DEVICE: CPT

## 2022-01-05 PROCEDURE — C1769 GUIDE WIRE: HCPCS

## 2022-01-05 RX ORDER — VANCOMYCIN HYDROCHLORIDE 500 MG/10ML
1 INJECTION, POWDER, LYOPHILIZED, FOR SOLUTION INTRAVENOUS
Status: DISCONTINUED | OUTPATIENT
Start: 2022-01-05 | End: 2022-01-05

## 2022-01-05 RX ORDER — INSULIN GLARGINE 100 [IU]/ML
18 INJECTION, SOLUTION SUBCUTANEOUS
Status: DISCONTINUED | OUTPATIENT
Start: 2022-01-05 | End: 2022-01-06

## 2022-01-05 RX ADMIN — CETIRIZINE HYDROCHLORIDE 10 MG: 10 TABLET, FILM COATED ORAL at 08:34

## 2022-01-05 RX ADMIN — RANOLAZINE 500 MG: 500 TABLET, FILM COATED, EXTENDED RELEASE ORAL at 11:36

## 2022-01-05 RX ADMIN — METOPROLOL SUCCINATE 50 MG: 50 TABLET, EXTENDED RELEASE ORAL at 08:34

## 2022-01-05 RX ADMIN — EPOETIN ALFA-EPBX 10000 UNITS: 10000 INJECTION, SOLUTION INTRAVENOUS; SUBCUTANEOUS at 11:36

## 2022-01-05 RX ADMIN — SODIUM CHLORIDE, PRESERVATIVE FREE 10 ML: 5 INJECTION INTRAVENOUS at 05:10

## 2022-01-05 RX ADMIN — INSULIN GLARGINE 18 UNITS: 100 INJECTION, SOLUTION SUBCUTANEOUS at 22:00

## 2022-01-05 RX ADMIN — RANOLAZINE 500 MG: 500 TABLET, FILM COATED, EXTENDED RELEASE ORAL at 22:45

## 2022-01-05 RX ADMIN — SODIUM CHLORIDE, PRESERVATIVE FREE 10 ML: 5 INJECTION INTRAVENOUS at 14:52

## 2022-01-05 RX ADMIN — ATORVASTATIN CALCIUM 40 MG: 40 TABLET, FILM COATED ORAL at 22:45

## 2022-01-05 RX ADMIN — CLOPIDOGREL BISULFATE 75 MG: 75 TABLET ORAL at 08:34

## 2022-01-05 RX ADMIN — CINACALCET 30 MG: 30 TABLET, FILM COATED ORAL at 22:45

## 2022-01-05 RX ADMIN — Medication 1000 UNITS: at 08:34

## 2022-01-05 RX ADMIN — ASPIRIN 81 MG CHEWABLE TABLET 81 MG: 81 TABLET CHEWABLE at 08:34

## 2022-01-05 NOTE — PROGRESS NOTES
Dual RN skin assessment completed with Paulina Damon RN. Skin assessment revealed skin, warm dry and intact.

## 2022-01-05 NOTE — PROGRESS NOTES
Patient: Shakir Nguyen MRN: [id number]  SSN: [id number]    YOB: 1950  Age: 66 y.o. Sex: female          Subjective:     Patient seen at bedside. She is alert awake oriented. Did not have any active medical complaints. She is eager to go home. We discussed with her about the process of getting dialysis spot for her at her native place. Informed her that  was working on securing dialysis spot for her at the local unit. Objective:     Vitals:    01/04/22 1518 01/04/22 2121 01/05/22 0425 01/05/22 0810   BP: (!) 147/57 132/68 131/71 (!) 141/74   Pulse: 77 81 85 82   Resp: 18 20 20 17   Temp: 98.1 °F (36.7 °C) 98.4 °F (36.9 °C) 97.9 °F (36.6 °C) 98.7 °F (37.1 °C)   TempSrc:       SpO2: 100% 100% 100% 100%   Weight:       Height:            Intake and Output:  Yesterday's Intake and Output:  01/04 0701 - 01/05 0700  In: -   Out: 1300      Physical Exam:   GENERAL: alert, cooperative, no distress, appears stated age  EYE: negative  Respiratory - in no distress   ABDOMEN: soft, non-tender. Bowel sounds normal. No masses,  no organomegaly  EXTREMITIES:  extremities normal, atraumatic, no cyanosis or edema, no ulcers,SKIN: Normal.   Skin -  no rash or abnormalities  NEUROLOGIC: negative      Dialysis access: temporary catheter site right and IJ .     Data Review    Meds    Current Facility-Administered Medications   Medication Dose Route Frequency    insulin glargine (LANTUS) injection 22 Units  22 Units SubCUTAneous QHS    potassium chloride (K-DUR, KLOR-CON M20) SR tablet 40 mEq  40 mEq Oral DAILY    heparin (porcine) 1,000 unit/mL injection 2,200 Units  2,200 Units Hemodialysis DIALYSIS PRN    cinacalcet (SENSIPAR) tablet 30 mg  30 mg Oral QHS    epoetin ko-epbx (RETACRIT) injection 10,000 Units  10,000 Units SubCUTAneous Q MON, WED & FRI    glucose chewable tablet 16 g  4 Tablet Oral PRN    dextrose (D50W) injection syrg 12.5-25 g  25-50 mL IntraVENous PRN    glucagon (GLUCAGEN) injection 1 mg  1 mg IntraMUSCular PRN    sodium chloride (NS) flush 5-40 mL  5-40 mL IntraVENous Q8H    sodium chloride (NS) flush 5-40 mL  5-40 mL IntraVENous PRN    insulin lispro (HUMALOG) injection   SubCUTAneous AC&HS    acetaminophen (TYLENOL) tablet 650 mg  650 mg Oral Q4H PRN    0.9% sodium chloride infusion 250 mL  250 mL IntraVENous PRN    aspirin chewable tablet 81 mg  81 mg Oral DAILY    cetirizine (ZYRTEC) tablet 10 mg  10 mg Oral DAILY    clopidogreL (PLAVIX) tablet 75 mg  75 mg Oral DAILY    metoprolol succinate (TOPROL-XL) XL tablet 50 mg  50 mg Oral DAILY    nitroglycerin (NITROSTAT) tablet 0.4 mg  0.4 mg SubLINGual Q5MIN PRN    ranolazine ER (RANEXA) tablet 500 mg  500 mg Oral BID    atorvastatin (LIPITOR) tablet 40 mg  40 mg Oral QHS    cholecalciferol (VITAMIN D3) (1000 Units /25 mcg) tablet 1,000 Units  1,000 Units Oral DAILY    ondansetron (ZOFRAN) injection 4 mg  4 mg IntraVENous Q6H PRN       Lab      Recent Results (from the past 24 hour(s))   HEP B SURFACE AB    Collection Time: 01/04/22 11:45 AM   Result Value Ref Range    Hepatitis B surface Ab <3.10 mIU/mL    Hep B surface Ab Interp. NONREACTIVE NONREACTIVE   HEPATITIS B CORE AB, TOTAL    Collection Time: 01/04/22 11:45 AM   Result Value Ref Range    Hep B Core Ab, total Negative Negative     HEP B SURFACE AG    Collection Time: 01/04/22 11:45 AM   Result Value Ref Range    Hepatitis B surface Ag 0.12 Index    Hep B surface Ag Interp.  Negative Negative   GLUCOSE, POC    Collection Time: 01/04/22 11:49 AM   Result Value Ref Range    Glucose (POC) 119 (H) 65 - 117 mg/dL    Performed by Kameron Yang    GLUCOSE, POC    Collection Time: 01/04/22  3:20 PM   Result Value Ref Range    Glucose (POC) 99 65 - 117 mg/dL    Performed by Kameron Yang    GLUCOSE, POC    Collection Time: 01/04/22  9:23 PM   Result Value Ref Range    Glucose (POC) 102 65 - 117 mg/dL    Performed by Heaven Gerard (PCT)    GLUCOSE, POC    Collection Time: 01/05/22  8:10 AM   Result Value Ref Range    Glucose (POC) 68 65 - 117 mg/dL    Performed by Charity Brannon         Lab Results   Component Value Date/Time    Color Yellow/Straw 12/26/2021 11:24 PM    Appearance Clear 12/26/2021 11:24 PM    Specific gravity 1.011 12/26/2021 11:24 PM    pH (UA) 5.0 12/26/2021 11:24 PM    Protein 100 (A) 12/26/2021 11:24 PM    Glucose Negative 12/26/2021 11:24 PM    Ketone Negative 12/26/2021 11:24 PM    Bilirubin Negative 12/26/2021 11:24 PM    Urobilinogen 0.1 12/26/2021 11:24 PM    Nitrites Negative 12/26/2021 11:24 PM    Leukocyte Esterase Trace (A) 12/26/2021 11:24 PM    Bacteria Negative 12/26/2021 11:24 PM    WBC 0-4 12/26/2021 11:24 PM    RBC 0-5 12/26/2021 11:24 PM       Imaging         Assessment & Plan:     Principal Problem:    NSTEMI (non-ST elevated myocardial infarction) ([facility] (12/26/2021)    Active Problems:    HTN (hypertension) (12/26/2021)      HLD (hyperlipidemia) (12/26/2021)      Edema (12/26/2021)      Type 2 diabetes mellitus, with long-term current use of insulin ([facility] (12/26/2021)      CKD (chronic kidney disease) stage 5, GFR less than 15 ml/min ([facility] (12/26/2021)      Anemia (12/26/2021)      Acquired absence of kidney (12/26/2021)    1- JIM on possibly advanced CKD stage unknown:   -Since admission Cr 5.7->6.9 and BUN 80  -Unknown baseline.   -she probably had advanced CKD due to which her nephrologist [place] had discussed about starting dialysis with her.  -Renal US showed No right-sided hydronephrosis.  s/p left nephrectomy.  -Clinically volume overload, no uremia  - temporary dialysis catheter will be changed to permanent catheter. Consulted IR yesterday   -I have already spoken with the  to set up her dialysis chair close to her home in [place].  -She had her first dialysis 12/31.  -s/p HD 1/02, and 1/4/21   -will keep TTS schedule      2-Hypokalemia:  -from loops and thiazides  -will use 3K bath.    3-Anemia:  -likely due to advanced CKD and iron def   -Received 1 unit of PRBC in the ED. -started on Epo iv with HD   Will also give IV venofer with HD      4-NSTEMI   -started on heparin drip.  -Echo LVEF 30-35%  S/p Cath on 1/3/21- has totally occluded grafts      5-Hypertension:  -blood pressure currently better controlled. -Patient became hypotensive during dialysis  -Discontinue amlodipine Imdur and hydralazine.  -I will continue metoprolol 25 mg twice a day.     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   Please contact the author for any clarifications

## 2022-01-05 NOTE — PROGRESS NOTES
14:30: CLAY telephoned Isabella Shepherd" at [facility] to determine status of HD chair availability. At this writing, Dave Martinez is awaiting medical clearance from the dialysis facility with a tentative dialysis schedule of T-Th-Sat at 12noon. Tentative start date: Tuesday 01/11/2022.     11:53: CM continues to await response from [facility] in regards to HD chair status. Will f/u again later today. Today's nephrology note uploaded to [facility] in [employer]. 09:05: Chart reviewed. Hep Panel results noted and sent to [facility] dialysis center via [employer] for review. IR consulted for permacath placement per MD notes. CLAY has asked [facility] rep, Jenn Gutierrez [phone] if he needs anything further to secure a HD chair. Awaiting response.  CLAY will continue to follow patient and recs of medical team.

## 2022-01-05 NOTE — PROGRESS NOTES
Progress Note    Patient: Juvencio Escamilla MRN: [id number]  SSN: [id number]    YOB: 1950  Age: 66 y.o. Sex: female      Admit Date: 12/26/2021    LOS: 10 days     Subjective:     No acute events overnight  Objective:     Vitals:    01/04/22 1518 01/04/22 2121 01/05/22 0425 01/05/22 0810   BP: (!) 147/57 132/68 131/71 (!) 141/74   Pulse: 77 81 85 82   Resp: 18 20 20 17   Temp: 98.1 °F (36.7 °C) 98.4 °F (36.9 °C) 97.9 °F (36.6 °C) 98.7 °F (37.1 °C)   TempSrc:       SpO2: 100% 100% 100% 100%   Weight:       Height:            Intake and Output:  Current Shift: 01/05 0701 - 01/05 1900  In: 299 [P.O.:299]  Out: -   Last three shifts: 01/03 1901 - 01/05 0700  In: -   Out: 1300     Physical Exam:   General:  Alert, cooperative, no distress, appears stated age. Eyes:  Conjunctivae/corneas clear. PERRL, EOMs intact. Fundi benign   Ears:  Normal TMs and external ear canals both ears. Nose: Nares normal. Septum midline. Mucosa normal. No drainage or sinus tenderness. Mouth/Throat: Lips, mucosa, and tongue normal. Teeth and gums normal.   Neck: Supple, symmetrical, trachea midline, no adenopathy, thyroid: no enlargment/tenderness/nodules, no carotid bruit and no JVD. Back:   Symmetric, no curvature. ROM normal. No CVA tenderness. Lungs:   Clear to auscultation bilaterally. Heart:  Regular rate and rhythm, S1, S2 normal, no murmur, click, rub or gallop. Abdomen:   Soft, non-tender. Bowel sounds normal. No masses,  No organomegaly. Extremities: Extremities normal, atraumatic, no cyanosis or edema. Pulses: 2+ and symmetric all extremities. Skin: Skin color, texture, turgor normal. No rashes or lesions   Lymph nodes: Cervical, supraclavicular, and axillary nodes normal.   Neurologic: CNII-XII intact. Normal strength, sensation and reflexes throughout. Lab/Data Review: All lab results for the last 24 hours reviewed.          Assessment:     Principal Problem:    NSTEMI (non-ST elevated myocardial infarction) ([facility] (12/26/2021)    Active Problems:    HTN (hypertension) (12/26/2021)      HLD (hyperlipidemia) (12/26/2021)      Edema (12/26/2021)      Type 2 diabetes mellitus, with long-term current use of insulin ([facility] (12/26/2021)      CKD (chronic kidney disease) stage 5, GFR less than 15 ml/min (HCC) (12/26/2021)      Anemia (12/26/2021)      Acquired absence of kidney (12/26/2021)         Patient is a 78-year-old -American female with:  1. Non-STEMI  2. Heart failure with decompensation  3. Anemia  4. Status post nephrectomy for renal cell carcinoma  5. Peripheral vascular disease status post renal artery stenting  6. Diabetes mellitus  7. Hyperlipidemia  8. Hypertension  9. Hypokalemia  Plan:     Rhythm has been stable. Denied having any chest pain. Blood pressure has been under better control. Left radial pulses +2 without hematoma. Continue medical management with aspirin, atorvastatin, Plavix and metoprolol, ranolazine. If she has any chest pain then consider viability study and possible PCI of the LAD.       Signed By: Jessica Michael MD     January 5, 2022

## 2022-01-05 NOTE — PROGRESS NOTES
Hospitalist Progress Note               Daily Progress Note: 1/5/2022      Subjective: The patient is seen for follow up. Is a 50-year-old female with a history of coronary artery disease with CABG in 2001 and PCI as well as non-STEMI, stage V chronic kidney disease with a unilateral kidney, diabetes, history of nephrectomy for renal cell carcinoma who came to the ED late last night with complaints of shortness of breath and left-sided chest pain. Also with lower extremity edema. In the ED she was hypertensive with otherwise stable vital signs. Labs showed a hemoglobin of 7.1, potassium 2.9, creatinine 5.5 which is her baseline. Initial troponin was 876 with a repeat of 1068. Patient is from [address] [place] [address] and previously received all her care at [facility] but she went to [facility] instead because she did not trust the other hospital    Patient is normally followed by a cardiologist in [address] [place] [address]. Her nephrologist has previously recommended that she start dialysis but patient has refused    Her troponin peaked at 1191    Renal US showed No right-sided hydronephrosis.  s/p left nephrectomy. Clinically volume overload, no uremia. Patient was initially refusing consideration of hemodialysis but after further discussion with nephrology, she is agreeable. Temporary dialysis catheter which we can eventually exchange to permanent catheter. IR consulted   to set up her dialysis chair close to her home in [address] [place] [address]. Cardiac vath 1/3/22:via left radial artery. Totally occluded grafts. Extensively stented venous graft to the circumflex that is totally occluded. RCA is extensively stented as well that is totally occluded proximally and reconstitutes after the crux by collaterals from left circumflex artery. First obtuse marginal is totally occluded but fills by collaterals from the diagonal artery. LAD is totally occluded in the midsegment. Long . [place] is dyskinetic.     Echo showed an EF of 30 to 35% with mild to moderate mitral insufficiency and mild mitral stenosis. There was a small pericardial effusion  1/5  Seen at bedside, calm and no distress. Discussed with daughter telephonically in her presence, daughter has taken care of her home responsibilities/bills and she has continued to encourage patient to stay pending hd chair. Pt remains agreeable. HD chair being arranged though [place] state complicated and prolongs. bg to 68 this am    1/4  Alert nadd,  Wants to go home,  Reportedly stated intent to leave earlier, agrees now to stay    HD:Pt ended dialysis one hour and ten minutes early, of 3.5 hour hemodialysis treatment. Dr Meera Mccall seen pt on dialysis, pt complained to MD of 'tongue feeling like it has needles in it,' and of headache. 1.3 liters net fluid removed. Difficulty noted with right temp dialysis catheter, high arterial pressure at intervals. Hypotensive during hd, bp on telemetry has remained adequate. No cp.    hgb 7.1<--8.1  12/31  HD initiated, hypotensive during, bp rx adjusted, nephro appreciated. Denies cp or sob. Spo2 99% room air. Cardio input noted, cardiac cath anticipated Monday anticipating improved volume status. No new problems.         Problem List:  Problem List as of 1/5/2022 Date Reviewed: 12/26/2021          Codes Class Noted - Resolved    * (Principal) NSTEMI (non-ST elevated myocardial infarction) ([facility] ICD-10-CM: I21.4  ICD-9-CM: 410.70  12/26/2021 - Present        HTN (hypertension) ICD-10-CM: I10  ICD-9-CM: 401.9  12/26/2021 - Present        HLD (hyperlipidemia) ICD-10-CM: E78.5  ICD-9-CM: 272.4  12/26/2021 - Present        Edema ICD-10-CM: R60.9  ICD-9-CM: 782.3  12/26/2021 - Present        Type 2 diabetes mellitus, with long-term current use of insulin ([facility] ICD-10-CM: E11.9, Z79.4  ICD-9-CM: 250.00, V58.67  12/26/2021 - Present        CKD (chronic kidney disease) stage 5, GFR less than 15 ml/min ([facility] ICD-10-CM: N18.5  ICD-9-CM: 585.5  12/26/2021 - Present        Anemia ICD-10-CM: D64.9  ICD-9-CM: 285.9  12/26/2021 - Present        Acquired absence of kidney ICD-10-CM: Z90.5  ICD-9-CM: V45.73  12/26/2021 - Present              Medications reviewed  Current Facility-Administered Medications   Medication Dose Route Frequency    vancomycin (VANCOCIN) 1,000 mg in 0.9% sodium chloride 250 mL (VIAL-MATE)  1,000 mg IntraVENous ONCE    insulin glargine (LANTUS) injection 22 Units  22 Units SubCUTAneous QHS    potassium chloride (K-DUR, KLOR-CON M20) SR tablet 40 mEq  40 mEq Oral DAILY    heparin (porcine) 1,000 unit/mL injection 2,200 Units  2,200 Units Hemodialysis DIALYSIS PRN    cinacalcet (SENSIPAR) tablet 30 mg  30 mg Oral QHS    epoetin ko-epbx (RETACRIT) injection 10,000 Units  10,000 Units SubCUTAneous Q MON, WED & FRI    glucose chewable tablet 16 g  4 Tablet Oral PRN    dextrose (D50W) injection syrg 12.5-25 g  25-50 mL IntraVENous PRN    glucagon (GLUCAGEN) injection 1 mg  1 mg IntraMUSCular PRN    sodium chloride (NS) flush 5-40 mL  5-40 mL IntraVENous Q8H    sodium chloride (NS) flush 5-40 mL  5-40 mL IntraVENous PRN    insulin lispro (HUMALOG) injection   SubCUTAneous AC&HS    acetaminophen (TYLENOL) tablet 650 mg  650 mg Oral Q4H PRN    0.9% sodium chloride infusion 250 mL  250 mL IntraVENous PRN    aspirin chewable tablet 81 mg  81 mg Oral DAILY    cetirizine (ZYRTEC) tablet 10 mg  10 mg Oral DAILY    clopidogreL (PLAVIX) tablet 75 mg  75 mg Oral DAILY    metoprolol succinate (TOPROL-XL) XL tablet 50 mg  50 mg Oral DAILY    nitroglycerin (NITROSTAT) tablet 0.4 mg  0.4 mg SubLINGual Q5MIN PRN    ranolazine ER (RANEXA) tablet 500 mg  500 mg Oral BID    atorvastatin (LIPITOR) tablet 40 mg  40 mg Oral QHS    cholecalciferol (VITAMIN D3) (1000 Units /25 mcg) tablet 1,000 Units  1,000 Units Oral DAILY    ondansetron (ZOFRAN) injection 4 mg  4 mg IntraVENous Q6H PRN       Review of Systems:   A comprehensive review of systems was negative except for that written in the HPI. Objective:   Physical Exam:     Visit Vitals  BP (!) 141/74   Pulse 82   Temp 98.7 °F (37.1 °C)   Resp 17   Ht 4' 11\" (1.499 m)   Wt 81.2 kg (179 lb 0.2 oz)   SpO2 100%   BMI 36.16 kg/m²    O2 Flow Rate (L/min): 1 l/min O2 Device: None (Room air)    Temp (24hrs), Av.3 °F (36.8 °C), Min:97.9 °F (36.6 °C), Max:98.7 °F (37.1 °C)    701 -  190  In: 555 [P.O.:299]  Out: -    1901 -  0700  In: -   Out: 1300     General:   Awake and alert   Lungs:    CTA. Not labored. Chest wall:  No tenderness or deformity. Heart:  Regular rate and rhythm, S1, S2 normal, no murmur, click, rub or gallop. Abdomen:   Soft, non-tender. Bowel sounds normal. No masses,  No organomegaly. Extremities: Extremities normal, atraumatic, no cyanosis + edema improved. Pulses: 2+ and symmetric all extremities. Skin: Skin color, texture, turgor normal. No rashes or lesions   Neurologic: CNII-XII intact. No gross focal deficits         Data Review:       Recent Days:  Recent Labs     22  0845   WBC 11.3*   HGB 7.1*   HCT 23.0*        Recent Labs     22  0845   *   K 4.1   CL 96*   CO2 26   *   BUN 41*   CREA 5.22*   CA 8.3*   PHOS 4.2   ALB 3.0*     No results for input(s): PH, PCO2, PO2, HCO3, FIO2 in the last 72 hours.     24 Hour Results:  Recent Results (from the past 24 hour(s))   GLUCOSE, POC    Collection Time: 22  9:23 PM   Result Value Ref Range    Glucose (POC) 102 65 - 117 mg/dL    Performed by Manoj Matthew (PCT)    GLUCOSE, POC    Collection Time: 22  8:10 AM   Result Value Ref Range    Glucose (POC) 68 65 - 117 mg/dL    Performed by Nora Mosquera, POC    Collection Time: 22 12:08 PM   Result Value Ref Range    Glucose (POC) 80 65 - 117 mg/dL    Performed by Nora Mosquera, POC    Collection Time: 22  5:20 PM   Result Value Ref Range Glucose (POC) 108 65 - 117 mg/dL    Performed by Amaya Cerna (Float)        XR CHEST PORT   Final Result   FINDINGS: IMPRESSION: Single frontal view of the chest. Interval placement right   dialysis catheter distal tip SVC/RA junction region. No pneumothorax. Median sternotomy status post CABG. Coronary artery stenting. Unchanged mild   cardiomegaly. No vascular congestion or pulmonary edema. The lungs are well inflated. No infiltrate, pleural effusion, lobar   consolidation. US RETROPERITONEUM COMP   Final Result   Echogenic right kidney consistent with medical renal disease. No   right-sided hydronephrosis. Right renal cysts. Reported left nephrectomy. IR INSERT TUNL CVC W/O PORT OVER 5 YR    (Results Pending)   IR FLUORO GUIDE PLC CVAD    (Results Pending)        Assessment:  Acute non-STEMI. S/p cardiac cath with totally occluded grafts, extensive cad, conservative    Acute on chronic HFrEF, improved with hd    Chronic kidney disease stage V.  Started hemodialysis 12/31, temp cath placed, perm before dc, cm working on chair - home in [place]. Unilateral kidney with history of nephrectomy for renal cell carcinoma. No rt hydronephrosis    History of renal artery stenosis of solitary right kidney    Anemia of chronic kidney disease likely, got 1 unit prbc in ed, hgb drop to 7.1    Coronary artery disease with history of CABG and PCI. Diabetes mellitus type 2. A1c 5.6. Pta on 44 units lantus, decreased here to 25 units qhs + ssi. Blood sugars remain adequate. Hypertension: Hypotension with HD. 12/31:-Discontinued amlodipine Imdur and hydralazine. continue metoprololxl 50 mg tDaily. Hyperlipidemia on Atorvastatin    Peripheral vascular disease with history of left SFA stenosis    Hypokalemia repleted    Plan:  HD per nephrology tts  Continue metoprolol 25 mg bid  Cm working on hd chair in [place]- will determine when can dc.    Continue  lantus 22 units, ssi & hypoglycemia protocol. Monitor hgb  IR for switch to perm cath    vtep mechanical  Full code    Care Plan discussed with: Patient/Family, consultant. Disposition: Continued inpatient care, hd initiated, cm working on chair in [place]. Total time spent with patient: 30 minutes.     Yasmany Teixeira MD

## 2022-01-06 LAB
ERYTHROCYTE [DISTWIDTH] IN BLOOD BY AUTOMATED COUNT: 17.7 % (ref 11.5–14.5)
GLUCOSE BLD STRIP.AUTO-MCNC: 104 MG/DL (ref 65–117)
GLUCOSE BLD STRIP.AUTO-MCNC: 110 MG/DL (ref 65–117)
GLUCOSE BLD STRIP.AUTO-MCNC: 122 MG/DL (ref 65–117)
GLUCOSE BLD STRIP.AUTO-MCNC: 71 MG/DL (ref 65–117)
HCT VFR BLD AUTO: 24.7 % (ref 35–47)
HGB BLD-MCNC: 7.8 G/DL (ref 11.5–16)
MCH RBC QN AUTO: 25.1 PG (ref 26–34)
MCHC RBC AUTO-ENTMCNC: 31.6 G/DL (ref 30–36.5)
MCV RBC AUTO: 79.4 FL (ref 80–99)
NRBC # BLD: 0.02 K/UL (ref 0–0.01)
NRBC BLD-RTO: 0.2 PER 100 WBC
PERFORMED BY, TECHID: ABNORMAL
PERFORMED BY, TECHID: NORMAL
PLATELET # BLD AUTO: 260 K/UL (ref 150–400)
PMV BLD AUTO: 10.8 FL (ref 8.9–12.9)
RBC # BLD AUTO: 3.11 M/UL (ref 3.8–5.2)
WBC # BLD AUTO: 13.3 K/UL (ref 3.6–11)

## 2022-01-06 PROCEDURE — 90935 HEMODIALYSIS ONE EVALUATION: CPT

## 2022-01-06 PROCEDURE — 74011250637 HC RX REV CODE- 250/637: Performed by: INTERNAL MEDICINE

## 2022-01-06 PROCEDURE — 65270000029 HC RM PRIVATE

## 2022-01-06 PROCEDURE — 85027 COMPLETE CBC AUTOMATED: CPT

## 2022-01-06 PROCEDURE — 74011250637 HC RX REV CODE- 250/637: Performed by: HOSPITALIST

## 2022-01-06 PROCEDURE — 36415 COLL VENOUS BLD VENIPUNCTURE: CPT

## 2022-01-06 PROCEDURE — 82962 GLUCOSE BLOOD TEST: CPT

## 2022-01-06 PROCEDURE — 74011250636 HC RX REV CODE- 250/636

## 2022-01-06 PROCEDURE — 74011636637 HC RX REV CODE- 636/637: Performed by: INTERNAL MEDICINE

## 2022-01-06 RX ORDER — HEPARIN SODIUM 1000 [USP'U]/ML
INJECTION, SOLUTION INTRAVENOUS; SUBCUTANEOUS
Status: COMPLETED
Start: 2022-01-06 | End: 2022-01-06

## 2022-01-06 RX ORDER — HEPARIN SODIUM 1000 [USP'U]/ML
3600 INJECTION, SOLUTION INTRAVENOUS; SUBCUTANEOUS
Status: DISCONTINUED | OUTPATIENT
Start: 2022-01-06 | End: 2022-01-08 | Stop reason: HOSPADM

## 2022-01-06 RX ORDER — INSULIN GLARGINE 100 [IU]/ML
16 INJECTION, SOLUTION SUBCUTANEOUS
Status: DISCONTINUED | OUTPATIENT
Start: 2022-01-06 | End: 2022-01-08 | Stop reason: HOSPADM

## 2022-01-06 RX ORDER — INSULIN LISPRO 100 [IU]/ML
INJECTION, SOLUTION INTRAVENOUS; SUBCUTANEOUS
Status: DISCONTINUED | OUTPATIENT
Start: 2022-01-06 | End: 2022-01-08 | Stop reason: HOSPADM

## 2022-01-06 RX ADMIN — POTASSIUM CHLORIDE 40 MEQ: 1500 TABLET, EXTENDED RELEASE ORAL at 17:28

## 2022-01-06 RX ADMIN — HEPARIN SODIUM 3600 UNITS: 1000 INJECTION, SOLUTION INTRAVENOUS; SUBCUTANEOUS at 14:09

## 2022-01-06 RX ADMIN — CLOPIDOGREL BISULFATE 75 MG: 75 TABLET ORAL at 10:23

## 2022-01-06 RX ADMIN — ATORVASTATIN CALCIUM 40 MG: 40 TABLET, FILM COATED ORAL at 22:07

## 2022-01-06 RX ADMIN — INSULIN GLARGINE 16 UNITS: 100 INJECTION, SOLUTION SUBCUTANEOUS at 22:00

## 2022-01-06 RX ADMIN — METOPROLOL SUCCINATE 50 MG: 50 TABLET, EXTENDED RELEASE ORAL at 10:24

## 2022-01-06 RX ADMIN — CINACALCET 30 MG: 30 TABLET, FILM COATED ORAL at 22:07

## 2022-01-06 RX ADMIN — ASPIRIN 81 MG CHEWABLE TABLET 81 MG: 81 TABLET CHEWABLE at 10:23

## 2022-01-06 RX ADMIN — CETIRIZINE HYDROCHLORIDE 10 MG: 10 TABLET, FILM COATED ORAL at 17:28

## 2022-01-06 RX ADMIN — RANOLAZINE 500 MG: 500 TABLET, FILM COATED, EXTENDED RELEASE ORAL at 22:42

## 2022-01-06 RX ADMIN — Medication 1000 UNITS: at 10:24

## 2022-01-06 RX ADMIN — RANOLAZINE 500 MG: 500 TABLET, FILM COATED, EXTENDED RELEASE ORAL at 10:23

## 2022-01-06 RX ADMIN — ACETAMINOPHEN 650 MG: 325 TABLET ORAL at 10:24

## 2022-01-06 NOTE — PROGRESS NOTES
Hospitalist Progress Note               Daily Progress Note: 1/6/2022      Subjective: The patient is seen for follow up. Is a 79-year-old female with a history of coronary artery disease with CABG in 2001 and PCI as well as non-STEMI, stage V chronic kidney disease with a unilateral kidney, diabetes, history of nephrectomy for renal cell carcinoma who came to the ED late last night with complaints of shortness of breath and left-sided chest pain. Also with lower extremity edema. In the ED she was hypertensive with otherwise stable vital signs. Labs showed a hemoglobin of 7.1, potassium 2.9, creatinine 5.5 which is her baseline. Initial troponin was 876 with a repeat of 1068. Patient is from [place] and previously received all her care at [facility] but she went to [facility] instead because she did not trust the other hospital    Patient is normally followed by a cardiologist in [place]. Her nephrologist has previously recommended that she start dialysis but patient has refused    Her troponin peaked at 1191    Renal US showed No right-sided hydronephrosis.  s/p left nephrectomy. Clinically volume overload, no uremia. Patient was initially refusing consideration of hemodialysis but after further discussion with nephrology, she is agreeable. Temporary dialysis catheter which we can eventually exchange to permanent catheter. IR consulted   to set up her dialysis chair close to her home in [place]. Cardiac vath 1/3/22:via left radial artery. Totally occluded grafts. Extensively stented venous graft to the circumflex that is totally occluded. RCA is extensively stented as well that is totally occluded proximally and reconstitutes after the crux by collaterals from left circumflex artery. First obtuse marginal is totally occluded but fills by collaterals from the diagonal artery. LAD is totally occluded in the midsegment. Long . [place] is dyskinetic.     Echo showed an EF of 30 to 35% with mild to moderate mitral insufficiency and mild mitral stenosis. There was a small pericardial effusion    1/6  Seen at bedside, calm and no distress. Rt ij perm cath  Tolerated hd today  [place] but not available till tuesady- hopefully can dc after hd Saturday. Pt states working on a ride. Discussed with daughter telephonically in her presence, daughter has taken care of her home responsibilities/bills and she has continued to encourage patient to stay pending hd chair. Pt remains agreeable. HD chair being arranged though out of state complicated and prolongs.   bg to 71 this am        Problem List:  Problem List as of 1/6/2022 Date Reviewed: 12/26/2021          Codes Class Noted - Resolved    * (Principal) NSTEMI (non-ST elevated myocardial infarction) ([facility] [address] ICD-10-CM: I21.4  ICD-9-CM: 410.70  12/26/2021 - Present        HTN (hypertension) ICD-10-CM: I10  ICD-9-CM: 401.9  12/26/2021 - Present        HLD (hyperlipidemia) ICD-10-CM: E78.5  ICD-9-CM: 272.4  12/26/2021 - Present        Edema ICD-10-CM: R60.9  ICD-9-CM: 782.3  12/26/2021 - Present        Type 2 diabetes mellitus, with long-term current use of insulin (HCC) ICD-10-CM: E11.9, Z79.4  ICD-9-CM: 250.00, V58.67  12/26/2021 - Present        CKD (chronic kidney disease) stage 5, GFR less than 15 ml/min (HCC) ICD-10-CM: N18.5  ICD-9-CM: 585.5  12/26/2021 - Present        Anemia ICD-10-CM: D64.9  ICD-9-CM: 285.9  12/26/2021 - Present        Acquired absence of kidney ICD-10-CM: Z90.5  ICD-9-CM: V45.73  12/26/2021 - Present              Medications reviewed  Current Facility-Administered Medications   Medication Dose Route Frequency    heparin (porcine) 1,000 unit/mL injection 3,600 Units  3,600 Units Hemodialysis DIALYSIS PRN    insulin glargine (LANTUS) injection 18 Units  18 Units SubCUTAneous QHS    potassium chloride (K-DUR, KLOR-CON M20) SR tablet 40 mEq  40 mEq Oral DAILY    cinacalcet (SENSIPAR) tablet 30 mg  30 mg Oral QHS    epoetin ko-epbx (RETACRIT) injection 10,000 Units  10,000 Units SubCUTAneous Q MON, WED & FRI    glucose chewable tablet 16 g  4 Tablet Oral PRN    dextrose (D50W) injection syrg 12.5-25 g  25-50 mL IntraVENous PRN    glucagon (GLUCAGEN) injection 1 mg  1 mg IntraMUSCular PRN    sodium chloride (NS) flush 5-40 mL  5-40 mL IntraVENous Q8H    sodium chloride (NS) flush 5-40 mL  5-40 mL IntraVENous PRN    insulin lispro (HUMALOG) injection   SubCUTAneous AC&HS    acetaminophen (TYLENOL) tablet 650 mg  650 mg Oral Q4H PRN    0.9% sodium chloride infusion 250 mL  250 mL IntraVENous PRN    aspirin chewable tablet 81 mg  81 mg Oral DAILY    cetirizine (ZYRTEC) tablet 10 mg  10 mg Oral DAILY    clopidogreL (PLAVIX) tablet 75 mg  75 mg Oral DAILY    metoprolol succinate (TOPROL-XL) XL tablet 50 mg  50 mg Oral DAILY    nitroglycerin (NITROSTAT) tablet 0.4 mg  0.4 mg SubLINGual Q5MIN PRN    ranolazine ER (RANEXA) tablet 500 mg  500 mg Oral BID    atorvastatin (LIPITOR) tablet 40 mg  40 mg Oral QHS    cholecalciferol (VITAMIN D3) (1000 Units /25 mcg) tablet 1,000 Units  1,000 Units Oral DAILY    ondansetron (ZOFRAN) injection 4 mg  4 mg IntraVENous Q6H PRN       Review of Systems:   A comprehensive review of systems was negative except for that written in the HPI. Objective:   Physical Exam:     Visit Vitals  /61 (BP Patient Position: At rest;Sitting)   Pulse 85   Temp 98.3 °F (36.8 °C)   Resp 18   Ht 4' 11\" (1.499 m)   Wt 81.2 kg (179 lb 0.2 oz)   SpO2 100%   BMI 36.16 kg/m²    O2 Flow Rate (L/min): 1 l/min O2 Device: None (Room air)    Temp (24hrs), Av.3 °F (36.8 °C), Min:98.1 °F (36.7 °C), Max:98.6 °F (37 °C)     07 -  1900  In: -   Out: 1800    01/04 1901 -  0700  In: 299 [P.O.:299]  Out: -     General:   Awake and alert   Lungs:    CTA. Not labored. Chest wall:  No tenderness or deformity.  RT ij hd cath   Heart:  Regular rate and rhythm, S1, S2 normal, no murmur, click, rub or gallop. Abdomen:   Soft, non-tender. Bowel sounds normal. No masses,  No organomegaly. Extremities: Extremities normal, atraumatic, no cyanosis + edema improved. Pulses: 2+ and symmetric all extremities. Skin: Skin color, texture, turgor normal. No rashes or lesions   Neurologic: CNII-XII intact. No gross focal deficits         Data Review:       Recent Days:  Recent Labs     01/04/22  0845   WBC 11.3*   HGB 7.1*   HCT 23.0*        Recent Labs     01/04/22  0845   *   K 4.1   CL 96*   CO2 26   *   BUN 41*   CREA 5.22*   CA 8.3*   PHOS 4.2   ALB 3.0*     No results for input(s): PH, PCO2, PO2, HCO3, FIO2 in the last 72 hours. 24 Hour Results:  Recent Results (from the past 24 hour(s))   GLUCOSE, POC    Collection Time: 01/05/22  9:31 PM   Result Value Ref Range    Glucose (POC) 136 (H) 65 - 117 mg/dL    Performed by ERIN TONG    GLUCOSE, POC    Collection Time: 01/06/22  7:16 AM   Result Value Ref Range    Glucose (POC) 71 65 - 117 mg/dL    Performed by [address], POC    Collection Time: 01/06/22 12:21 PM   Result Value Ref Range    Glucose (POC) 104 65 - 117 mg/dL    Performed by Kelvin Prado, POC    Collection Time: 01/06/22  4:08 PM   Result Value Ref Range    Glucose (POC) 122 (H) 65 - 117 mg/dL    Performed by MEGAN SOLIS        IR INSERT TUNL CVC W/O PORT OVER 5 YR   Final Result   1. Successful placement of a double-lumen tunneled permacath. The catheter is   ready for use. 2.  Successful removal of existing temporary dialysis catheter. IR FLUORO GUIDE PLC CVAD   Final Result   1. Successful placement of a double-lumen tunneled permacath. The catheter is   ready for use. 2.  Successful removal of existing temporary dialysis catheter.          XR CHEST PORT   Final Result   FINDINGS: IMPRESSION: Single frontal view of the chest. Interval placement right   dialysis catheter distal tip SVC/RA junction region. No pneumothorax. Median sternotomy status post CABG. Coronary artery stenting. Unchanged mild   cardiomegaly. No vascular congestion or pulmonary edema. The lungs are well inflated. No infiltrate, pleural effusion, lobar   consolidation. US RETROPERITONEUM COMP   Final Result   Echogenic right kidney consistent with medical renal disease. No   right-sided hydronephrosis. Right renal cysts. Reported left nephrectomy. Assessment:  Acute non-STEMI. S/p cardiac cath with totally occluded grafts, extensive cad, conservative    Acute on chronic HFrEF, improved/resolved with hd    Chronic kidney disease stage V.  Started hemodialysis 12/31, temp cath placed switched to perm cath. . Chair arranged in [place] though tot until Tuesday so anticipate dc Saturday. Unilateral kidney with history of nephrectomy for renal cell carcinoma. No rt hydronephrosis    History of renal artery stenosis of solitary right kidney    Anemia of chronic kidney disease likely, got 1 unit prbc in ed, hgb drop to 7.1, getting hd/venofer with hd    Coronary artery disease with history of CABG and PCI as above    Diabetes mellitus type 2. A1c 5.6. Pta on 44 units lantus, titrating to 16 units today 1/6, bg 71 this am. ssi to sensitive. Hypertension: Hypotension with HD. 12/31:-Discontinued amlodipine Imdur and hydralazine. continue metoprololxl 50 mg Daily. Hyperlipidemia on Atorvastatin    Peripheral vascular disease with history of left SFA stenosis    Hypokalemia repleted    Plan:  HD per nephrology tts  Continue metoprolol 50 mg bid  lantus decreased, 16 units qhs, ssi switched to sensitive & hypoglycemia protocol. Monitor hgb    HD chair arranged for next Tuesday 1/11/22. Anticipate dc home after hd 1/11/22. She states is working on ride home. vtep mechanical  Full code    Care Plan discussed with: Patient/Family, consultant.     Disposition: Continued inpatient care, hd initiated, dc anticipated 1/8 d/t hd chair arrangement. Total time spent with patient: 30 minutes.     Royal Kiana MD

## 2022-01-06 NOTE — PROGRESS NOTES
Dialysis planned today. Patient to have dressing change and access to site for dialysis. Planned for patient to be inpatient through Saturday for next dialysis treatment per attending.

## 2022-01-06 NOTE — PROGRESS NOTES
Patient: Jabari Black MRN: [id number]  SSN: [id number]    YOB: 1950  Age: 66 y.o. Sex: female          Subjective:     Patient seen at bedside. She had dialysis this morning. Tolerated the procedure well. Discussed with her that case management is still working on finding dialysis place for her. Objective:     Vitals:    01/06/22 1245 01/06/22 1315 01/06/22 1345 01/06/22 1415   BP: (!) 144/59 118/69 (!) 87/49 (!) 144/72   Pulse: 79 82 76 85   Resp:  16  16   Temp:    98.6 °F (37 °C)   TempSrc:    Oral   SpO2:       Weight:       Height:            Intake and Output:  Yesterday's Intake and Output:  01/05 0701 - 01/06 0700  In: 299 [P.O.:299]  Out: -      Physical Exam:   GENERAL: alert, cooperative, no distress, appears stated age  EYE: negative  Respiratory - in no distress   ABDOMEN: soft, non-tender. Bowel sounds normal. No masses,  no organomegaly  EXTREMITIES:  extremities normal, atraumatic, no cyanosis or edema, no ulcers,SKIN: Normal.   Skin -  no rash or abnormalities  NEUROLOGIC: negative      Dialysis access: temporary catheter site right and IJ .     Data Review    Meds    Current Facility-Administered Medications   Medication Dose Route Frequency    heparin (porcine) 1,000 unit/mL injection 3,600 Units  3,600 Units Hemodialysis DIALYSIS PRN    insulin glargine (LANTUS) injection 18 Units  18 Units SubCUTAneous QHS    potassium chloride (K-DUR, KLOR-CON M20) SR tablet 40 mEq  40 mEq Oral DAILY    cinacalcet (SENSIPAR) tablet 30 mg  30 mg Oral QHS    epoetin ko-epbx (RETACRIT) injection 10,000 Units  10,000 Units SubCUTAneous Q MON, WED & FRI    glucose chewable tablet 16 g  4 Tablet Oral PRN    dextrose (D50W) injection syrg 12.5-25 g  25-50 mL IntraVENous PRN    glucagon (GLUCAGEN) injection 1 mg  1 mg IntraMUSCular PRN    sodium chloride (NS) flush 5-40 mL  5-40 mL IntraVENous Q8H    sodium chloride (NS) flush 5-40 mL  5-40 mL IntraVENous PRN    insulin lispro (HUMALOG) injection   SubCUTAneous AC&HS    acetaminophen (TYLENOL) tablet 650 mg  650 mg Oral Q4H PRN    0.9% sodium chloride infusion 250 mL  250 mL IntraVENous PRN    aspirin chewable tablet 81 mg  81 mg Oral DAILY    cetirizine (ZYRTEC) tablet 10 mg  10 mg Oral DAILY    clopidogreL (PLAVIX) tablet 75 mg  75 mg Oral DAILY    metoprolol succinate (TOPROL-XL) XL tablet 50 mg  50 mg Oral DAILY    nitroglycerin (NITROSTAT) tablet 0.4 mg  0.4 mg SubLINGual Q5MIN PRN    ranolazine ER (RANEXA) tablet 500 mg  500 mg Oral BID    atorvastatin (LIPITOR) tablet 40 mg  40 mg Oral QHS    cholecalciferol (VITAMIN D3) (1000 Units /25 mcg) tablet 1,000 Units  1,000 Units Oral DAILY    ondansetron (ZOFRAN) injection 4 mg  4 mg IntraVENous Q6H PRN       Lab      Recent Results (from the past 24 hour(s))   GLUCOSE, POC    Collection Time: 01/05/22  5:20 PM   Result Value Ref Range    Glucose (POC) 108 65 - 117 mg/dL    Performed by Angie YorkFloat)    GLUCOSE, POC    Collection Time: 01/05/22  9:31 PM   Result Value Ref Range    Glucose (POC) 136 (H) 65 - 117 mg/dL    Performed by ERIN TONG    GLUCOSE, POC    Collection Time: 01/06/22  7:16 AM   Result Value Ref Range    Glucose (POC) 71 65 - 117 mg/dL    Performed by Eldon Mejias    GLUCOSE, POC    Collection Time: 01/06/22 12:21 PM   Result Value Ref Range    Glucose (POC) 104 65 - 117 mg/dL    Performed by Zoey Powell         Lab Results   Component Value Date/Time    Color Yellow/Straw 12/26/2021 11:24 PM    Appearance Clear 12/26/2021 11:24 PM    Specific gravity 1.011 12/26/2021 11:24 PM    pH (UA) 5.0 12/26/2021 11:24 PM    Protein 100 (A) 12/26/2021 11:24 PM    Glucose Negative 12/26/2021 11:24 PM    Ketone Negative 12/26/2021 11:24 PM    Bilirubin Negative 12/26/2021 11:24 PM    Urobilinogen 0.1 12/26/2021 11:24 PM    Nitrites Negative 12/26/2021 11:24 PM    Leukocyte Esterase Trace (A) 12/26/2021 11:24 PM    Bacteria Negative 12/26/2021 11:24 PM    WBC 0-4 12/26/2021 11:24 PM    RBC 0-5 12/26/2021 11:24 PM       Imaging         Assessment & Plan:     Principal Problem:    NSTEMI (non-ST elevated myocardial infarction) ([facility] [address] (12/26/2021)    Active Problems:    HTN (hypertension) (12/26/2021)      HLD (hyperlipidemia) (12/26/2021)      Edema (12/26/2021)      Type 2 diabetes mellitus, with long-term current use of insulin ([facility] [address] (12/26/2021)      CKD (chronic kidney disease) stage 5, GFR [facility] than 15 ml[facility]min [facility] (12/26/2021)      Anemia (12/26/2021)      Acquired absence of kidney (12/26/2021)    1- JIM on possibly advanced CKD stage unknown:   -Since admission Cr 5.7->6.9 and BUN 80  -Unknown baseline.   -she probably had advanced CKD due to which her nephrologist KEN RICKS [facility] had discussed about starting dialysis with her.  -Renal US showed No right-sided hydronephrosis.  s/p left nephrectomy.  -Clinically was volume overload, no uremia  -She had her first dialysis 12/31.  -s/p HD 1/02, and 1/4/21   -will keep TTS schedule   -RT IJ permacath       2-Anemia:  -likely due to advanced CKD and iron def   -Received 1 unit of PRBC in the ED. -started on Epo iv with HD   Will also give IV venofer with HD      3-NSTEMI   -started on heparin drip.  -Echo LVEF 30-35%  S/p Cath on 1/3/21- has totally occluded grafts      5-Hypertension:  -blood pressure currently better controlled.   -Patient became hypotensive during dialysis today ,Discontinue amlodipine Imdur and hydralazine.  -I will continue metoprolol 25 mg twice a day.     6-CHF:  -Decompensated and fluid overload due to #1  -LVEF 30 to 35%  -will dc metolazone  -will dc lasix  -s/p volume removal with HD      7-Renal osteodystrophy:  -Ca 10.4, Phos 3.5  - iPTH 236 and Vit D 50.1  -started on sensipar 30 mg daily    Signed By: Estefania Nicholas MD     January 6, 2022      This note was prepared using voice recognition system and is likely to have sound alike errors that may have been overlooked even during proofreading.   Please contact the author for any clarifications

## 2022-01-06 NOTE — PROGRESS NOTES
1348: Chart reviewed. CM has messaged Zeeshan Amanda with [address] this morning to determine if he will receive medical clearance from the dialysis facility today. Tentative dialysis schedule T-Th-Sat at 12noon. Tentative start date: Tuesday 01/11/2022.  CM will continue to follow patient and recs of medical team.

## 2022-01-07 LAB
GLUCOSE BLD STRIP.AUTO-MCNC: 115 MG/DL (ref 65–117)
GLUCOSE BLD STRIP.AUTO-MCNC: 129 MG/DL (ref 65–117)
GLUCOSE BLD STRIP.AUTO-MCNC: 87 MG/DL (ref 65–117)
GLUCOSE BLD STRIP.AUTO-MCNC: 90 MG/DL (ref 65–117)
PERFORMED BY, TECHID: ABNORMAL
PERFORMED BY, TECHID: NORMAL

## 2022-01-07 PROCEDURE — 74011636637 HC RX REV CODE- 636/637: Performed by: INTERNAL MEDICINE

## 2022-01-07 PROCEDURE — 74011250637 HC RX REV CODE- 250/637: Performed by: INTERNAL MEDICINE

## 2022-01-07 PROCEDURE — 82962 GLUCOSE BLOOD TEST: CPT

## 2022-01-07 PROCEDURE — 74011250637 HC RX REV CODE- 250/637: Performed by: HOSPITALIST

## 2022-01-07 PROCEDURE — 65270000029 HC RM PRIVATE

## 2022-01-07 PROCEDURE — 74011250636 HC RX REV CODE- 250/636: Performed by: INTERNAL MEDICINE

## 2022-01-07 RX ADMIN — POTASSIUM CHLORIDE 40 MEQ: 1500 TABLET, EXTENDED RELEASE ORAL at 08:15

## 2022-01-07 RX ADMIN — CLOPIDOGREL BISULFATE 75 MG: 75 TABLET ORAL at 08:15

## 2022-01-07 RX ADMIN — METOPROLOL SUCCINATE 50 MG: 50 TABLET, EXTENDED RELEASE ORAL at 08:15

## 2022-01-07 RX ADMIN — INSULIN GLARGINE 16 UNITS: 100 INJECTION, SOLUTION SUBCUTANEOUS at 21:42

## 2022-01-07 RX ADMIN — CINACALCET 30 MG: 30 TABLET, FILM COATED ORAL at 21:42

## 2022-01-07 RX ADMIN — ATORVASTATIN CALCIUM 40 MG: 40 TABLET, FILM COATED ORAL at 21:42

## 2022-01-07 RX ADMIN — ASPIRIN 81 MG CHEWABLE TABLET 81 MG: 81 TABLET CHEWABLE at 08:15

## 2022-01-07 RX ADMIN — CETIRIZINE HYDROCHLORIDE 10 MG: 10 TABLET, FILM COATED ORAL at 08:15

## 2022-01-07 RX ADMIN — RANOLAZINE 500 MG: 500 TABLET, FILM COATED, EXTENDED RELEASE ORAL at 08:14

## 2022-01-07 RX ADMIN — RANOLAZINE 500 MG: 500 TABLET, FILM COATED, EXTENDED RELEASE ORAL at 21:42

## 2022-01-07 RX ADMIN — Medication 1000 UNITS: at 08:15

## 2022-01-07 RX ADMIN — EPOETIN ALFA-EPBX 10000 UNITS: 10000 INJECTION, SOLUTION INTRAVENOUS; SUBCUTANEOUS at 17:34

## 2022-01-07 NOTE — PROGRESS NOTES
Patient: Brandy Graff MRN: [id number]  SSN: [id number]    YOB: 1950  Age: 66 y.o. Sex: female          Subjective:     Patient seen at bedside. Doing well  She said she didn't get enough rest because the IV pump alarm was beeping     She had dialysis yesterday. Informed her that she has a tentative schedule for HD in her native town and it would be TTS     Objective:     Vitals:    01/06/22 2203 01/07/22 0323 01/07/22 0606 01/07/22 0813   BP: (!) 128/57 124/66  (!) 131/59   Pulse: 83 85     Resp: 18 16  16   Temp: 98.1 °F (36.7 °C) 97.8 °F (36.6 °C)  98.3 °F (36.8 °C)   TempSrc:       SpO2: 99% 100%  100%   Weight:   72 kg (158 lb 11.7 oz)    Height:            Intake and Output:  Yesterday's Intake and Output:  01/06 0701 - 01/07 0700  In: -   Out: 1800      Physical Exam:   GENERAL: alert, cooperative, no distress, appears stated age  EYE: negative  Respiratory - in no distress   ABDOMEN: soft, non-tender. Bowel sounds normal. No masses,  no organomegaly  EXTREMITIES:  extremities normal, atraumatic, no cyanosis or edema, no ulcers,SKIN: Normal.   Skin -  no rash or abnormalities  NEUROLOGIC: negative      Dialysis access: temporary catheter site right and IJ .     Data Review    Meds    Current Facility-Administered Medications   Medication Dose Route Frequency    heparin (porcine) 1,000 unit/mL injection 3,600 Units  3,600 Units Hemodialysis DIALYSIS PRN    insulin glargine (LANTUS) injection 16 Units  16 Units SubCUTAneous QHS    insulin lispro (HUMALOG) injection   SubCUTAneous AC&HS    potassium chloride (K-DUR, KLOR-CON M20) SR tablet 40 mEq  40 mEq Oral DAILY    cinacalcet (SENSIPAR) tablet 30 mg  30 mg Oral QHS    epoetin ko-epbx (RETACRIT) injection 10,000 Units  10,000 Units SubCUTAneous Q MON, WED & FRI    glucose chewable tablet 16 g  4 Tablet Oral PRN    dextrose (D50W) injection syrg 12.5-25 g  25-50 mL IntraVENous PRN    glucagon (GLUCAGEN) injection 1 mg  1 mg IntraMUSCular PRN    sodium chloride (NS) flush 5-40 mL  5-40 mL IntraVENous Q8H    sodium chloride (NS) flush 5-40 mL  5-40 mL IntraVENous PRN    acetaminophen (TYLENOL) tablet 650 mg  650 mg Oral Q4H PRN    0.9% sodium chloride infusion 250 mL  250 mL IntraVENous PRN    aspirin chewable tablet 81 mg  81 mg Oral DAILY    cetirizine (ZYRTEC) tablet 10 mg  10 mg Oral DAILY    clopidogreL (PLAVIX) tablet 75 mg  75 mg Oral DAILY    metoprolol succinate (TOPROL-XL) XL tablet 50 mg  50 mg Oral DAILY    nitroglycerin (NITROSTAT) tablet 0.4 mg  0.4 mg SubLINGual Q5MIN PRN    ranolazine ER (RANEXA) tablet 500 mg  500 mg Oral BID    atorvastatin (LIPITOR) tablet 40 mg  40 mg Oral QHS    cholecalciferol (VITAMIN D3) (1000 Units /25 mcg) tablet 1,000 Units  1,000 Units Oral DAILY    ondansetron (ZOFRAN) injection 4 mg  4 mg IntraVENous Q6H PRN       Lab      Recent Results (from the past 24 hour(s))   GLUCOSE, POC    Collection Time: 01/06/22 12:21 PM   Result Value Ref Range    Glucose (POC) 104 65 - 117 mg/dL    Performed by Kelvin Prado, POC    Collection Time: 01/06/22  4:08 PM   Result Value Ref Range    Glucose (POC) 122 (H) 65 - 117 mg/dL    Performed by MEGAN SOLIS    CBC W/O DIFF    Collection Time: 01/06/22  8:48 PM   Result Value Ref Range    WBC 13.3 (H) 3.6 - 11.0 K/uL    RBC 3.11 (L) 3.80 - 5.20 M/uL    HGB 7.8 (L) 11.5 - 16.0 g/dL    HCT 24.7 (L) 35.0 - 47.0 %    MCV 79.4 (L) 80.0 - 99.0 FL    MCH 25.1 (L) 26.0 - 34.0 PG    MCHC 31.6 30.0 - 36.5 g/dL    RDW 17.7 (H) 11.5 - 14.5 %    PLATELET 701 425 - 891 K/uL    MPV 10.8 8.9 - 12.9 FL    NRBC 0.2 (H) 0.0  WBC    ABSOLUTE NRBC 0.02 (H) 0.00 - 0.01 K/uL   GLUCOSE, POC    Collection Time: 01/06/22 10:06 PM   Result Value Ref Range    Glucose (POC) 110 65 - 117 mg/dL    Performed by Shayy Leger    GLUCOSE, POC    Collection Time: 01/07/22  8:19 AM   Result Value Ref Range    Glucose (POC) 87 65 - 117 mg/dL    Performed by Willodean Pallas    GLUCOSE, POC    Collection Time: 01/07/22 11:10 AM   Result Value Ref Range    Glucose (POC) 115 65 - 117 mg/dL    Performed by Willodean Pallas         Lab Results   Component Value Date/Time    Color Yellow/Straw 12/26/2021 11:24 PM    Appearance Clear 12/26/2021 11:24 PM    Specific gravity 1.011 12/26/2021 11:24 PM    pH (UA) 5.0 12/26/2021 11:24 PM    Protein 100 (A) 12/26/2021 11:24 PM    Glucose Negative 12/26/2021 11:24 PM    Ketone Negative 12/26/2021 11:24 PM    Bilirubin Negative 12/26/2021 11:24 PM    Urobilinogen 0.1 12/26/2021 11:24 PM    Nitrites Negative 12/26/2021 11:24 PM    Leukocyte Esterase Trace (A) 12/26/2021 11:24 PM    Bacteria Negative 12/26/2021 11:24 PM    WBC 0-4 12/26/2021 11:24 PM    RBC 0-5 12/26/2021 11:24 PM       Imaging         Assessment & Plan:     Principal Problem:    NSTEMI (non-ST elevated myocardial infarction) ([facility] [address] (12/26/2021)    Active Problems:    HTN (hypertension) (12/26/2021)      HLD (hyperlipidemia) (12/26/2021)      Edema (12/26/2021)      Type 2 diabetes mellitus, with long-term current use of insulin ([facility] [address] (12/26/2021)      CKD (chronic kidney disease) stage 5, GFR less than 15 ml/min ([facility] [address] (12/26/2021)      Anemia (12/26/2021)      Acquired absence of kidney (12/26/2021)    1- JIM on possibly advanced CKD stage unknown:   -Since admission Cr 5.7->6.9 and BUN 80  -Unknown baseline.   -she probably had advanced CKD due to which her nephrologist KEN BLANCO [facility] had discussed about starting dialysis with her.  -Renal US showed No right-sided hydronephrosis.  s/p left nephrectomy.  -Clinically was volume overload, no uremia  -She had her first dialysis 12/31.  -s/p HD 1/02, and 1/4/21   -will keep TTS schedule   -RT IJ permacath     Since she has a TTS outPt Chair we will dialyze her tomorrow before Dc so that she can start at the new facility this coming Tuesday   Plan discussed with Patient, Primary Attending and      2-Anemia:  -likely due to advanced CKD and iron def   -Received 1 unit of PRBC in the ED. -started on Epo iv with HD   Will also give IV venofer with HD      3-NSTEMI   -started on heparin drip.  -Echo LVEF 30-35%  S/p Cath on 1/3/21- has totally occluded grafts      5-Hypertension:  -blood pressure currently better controlled. -Patient became hypotensive during dialysis today ,Discontinue amlodipine Imdur and hydralazine.  -I will continue metoprolol 25 mg twice a day.     6-CHF:  -Decompensated and fluid overload due to #1  -LVEF 30 to 35%  -will dc metolazone  -will dc lasix  -s/p volume removal with HD - volume status better now      7-Renal osteodystrophy:  -Ca 10.4, Phos 3.5  - iPTH 236 and Vit D 50.1  -started on sensipar 30 mg daily        Signed By: Dante Cook MD     January 7, 2022      This note was prepared using voice recognition system and is likely to have sound alike errors that may have been overlooked even during proofreading.   Please contact the author for any clarifications

## 2022-01-07 NOTE — PROGRESS NOTES
Hospitalist Progress Note               Daily Progress Note: 1/7/2022      Subjective: The patient is seen for follow up. Is a 39-year-old female with a history of coronary artery disease with CABG in 2001 and PCI as well as non-STEMI, stage V chronic kidney disease with a unilateral kidney, diabetes, history of nephrectomy for renal cell carcinoma who came to the ED late last night with complaints of shortness of breath and left-sided chest pain. Also with lower extremity edema. In the ED she was hypertensive with otherwise stable vital signs. Labs showed a hemoglobin of 7.1, potassium 2.9, creatinine 5.5 which is her baseline. Initial troponin was 876 with a repeat of 1068. Patient is from [place] and previously received all her care at [facility] but she went to [facility] instead because she did not trust the other hospital    Patient is normally followed by a cardiologist in [place]. Her nephrologist has previously recommended that she start dialysis but patient has refused    Her troponin peaked at 1191    Renal US showed No right-sided hydronephrosis.  s/p left nephrectomy. Clinically volume overload, no uremia. Patient was initially refusing consideration of hemodialysis but after further discussion with nephrology, she is agreeable. Temporary dialysis catheter which we can eventually exchange to permanent catheter. IR consulted   to set up her dialysis chair close to her home in [place]. Cardiac vath 1/3/22:via left radial artery. Totally occluded grafts. Extensively stented venous graft to the circumflex that is totally occluded. RCA is extensively stented as well that is totally occluded proximally and reconstitutes after the crux by collaterals from left circumflex artery. First obtuse marginal is totally occluded but fills by collaterals from the diagonal artery. LAD is totally occluded in the midsegment. Long . [place] is dyskinetic.     Echo showed an EF of 30 to 35% with mild to moderate mitral insufficiency and mild mitral stenosis. There was a small pericardial effusion    1/7  Patient is seen at bedside, she looks well no distress and denies complaints. She was hoping to be able to go home today rather than wait after dialysis tomorrow, spoke with nephrology, wants her to stay till after dialysis tomorrow with her next session being arranged with her hemodialysis chair now in [place]. 1/6  Seen at bedside, calm and no distress. Rt ij perm cath  Tolerated hd today  [place] chair [place] but not available till tuesady- hopefully can dc after hd Saturday. Pt states working on a ride. Discussed with daughter telephonically in her presence, daughter has taken care of her home responsibilities/bills and she has continued to encourage patient to stay pending hd chair. Pt remains agreeable. HD chair being arranged though out of state complicated and prolongs.   bg to 70 this am        Problem List:  Problem List as of 1/7/2022 Date Reviewed: 12/26/2021          Codes Class Noted - Resolved    * (Principal) NSTEMI (non-ST elevated myocardial infarction) ([facility] ICD-10-CM: I21.4  ICD-9-CM: 410.70  12/26/2021 - Present        HTN (hypertension) ICD-10-CM: I10  ICD-9-CM: 401.9  12/26/2021 - Present        HLD (hyperlipidemia) ICD-10-CM: E78.5  ICD-9-CM: 272.4  12/26/2021 - Present        Edema ICD-10-CM: R60.9  ICD-9-CM: 782.3  12/26/2021 - Present        Type 2 diabetes mellitus, with long-term current use of insulin ([facility] ICD-10-CM: E11.9, Z79.4  ICD-9-CM: 250.00, V58.67  12/26/2021 - Present        CKD (chronic kidney disease) stage 5, GFR less than 15 ml/min ([facility]) ICD-10-CM: N18.5  ICD-9-CM: 585.5  12/26/2021 - Present        Anemia ICD-10-CM: D64.9  ICD-9-CM: 285.9  12/26/2021 - Present        Acquired absence of kidney ICD-10-CM: Z90.5  ICD-9-CM: V45.73  12/26/2021 - Present              Medications reviewed  Current Facility-Administered Medications   Medication Dose Route Frequency    heparin (porcine) 1,000 unit/mL injection 3,600 Units  3,600 Units Hemodialysis DIALYSIS PRN    insulin glargine (LANTUS) injection 16 Units  16 Units SubCUTAneous QHS    insulin lispro (HUMALOG) injection   SubCUTAneous AC&HS    potassium chloride (K-DUR, KLOR-CON M20) SR tablet 40 mEq  40 mEq Oral DAILY    cinacalcet (SENSIPAR) tablet 30 mg  30 mg Oral QHS    epoetin ko-epbx (RETACRIT) injection 10,000 Units  10,000 Units SubCUTAneous Q MON, WED & FRI    glucose chewable tablet 16 g  4 Tablet Oral PRN    dextrose (D50W) injection syrg 12.5-25 g  25-50 mL IntraVENous PRN    glucagon (GLUCAGEN) injection 1 mg  1 mg IntraMUSCular PRN    sodium chloride (NS) flush 5-40 mL  5-40 mL IntraVENous Q8H    sodium chloride (NS) flush 5-40 mL  5-40 mL IntraVENous PRN    acetaminophen (TYLENOL) tablet 650 mg  650 mg Oral Q4H PRN    0.9% sodium chloride infusion 250 mL  250 mL IntraVENous PRN    aspirin chewable tablet 81 mg  81 mg Oral DAILY    cetirizine (ZYRTEC) tablet 10 mg  10 mg Oral DAILY    clopidogreL (PLAVIX) tablet 75 mg  75 mg Oral DAILY    metoprolol succinate (TOPROL-XL) XL tablet 50 mg  50 mg Oral DAILY    nitroglycerin (NITROSTAT) tablet 0.4 mg  0.4 mg SubLINGual Q5MIN PRN    ranolazine ER (RANEXA) tablet 500 mg  500 mg Oral BID    atorvastatin (LIPITOR) tablet 40 mg  40 mg Oral QHS    cholecalciferol (VITAMIN D3) (1000 Units /25 mcg) tablet 1,000 Units  1,000 Units Oral DAILY    ondansetron (ZOFRAN) injection 4 mg  4 mg IntraVENous Q6H PRN       Review of Systems:   A comprehensive review of systems was negative except for that written in the HPI.     Objective:   Physical Exam:     Visit Vitals  BP (!) 131/59 (BP 1 Location: Right upper arm, BP Patient Position: At rest)   Pulse 85   Temp 98.3 °F (36.8 °C)   Resp 16   Ht 4' 11\" (1.499 m)   Wt 72 kg (158 lb 11.7 oz)   SpO2 100%   BMI 32.06 kg/m²    O2 Flow Rate (L/min): 1 l/min O2 Device: None (Room air)    Temp (24hrs), Av.2 °F (36.8 °C), Min:97.8 °F (36.6 °C), Max:98.6 °F (37 °C)    No intake/output data recorded.  1901 -  0700  In: -   Out: 1800     General:   Awake and alert   Lungs:    CTA. Not labored. Chest wall:  No tenderness or deformity. RT ij hd cath   Heart:  Regular rate and rhythm, S1, S2 normal, no murmur, click, rub or gallop. Abdomen:   Soft, non-tender. Bowel sounds normal. No masses,  No organomegaly. Extremities: Extremities normal, atraumatic, no cyanosis + edema improved. Pulses: 2+ and symmetric all extremities. Skin: Skin color, texture, turgor normal. No rashes or lesions   Neurologic: CNII-XII intact. No gross focal deficits         Data Review:       Recent Days:  Recent Labs     22   WBC 13.3*   HGB 7.8*   HCT 24.7*        No results for input(s): NA, K, CL, CO2, GLU, BUN, CREA, CA, MG, PHOS, ALB, TBIL, TBILI, ALT, INR, INREXT, INREXT in the last 72 hours. No lab exists for component: SGOT  No results for input(s): PH, PCO2, PO2, HCO3, FIO2 in the last 72 hours.     24 Hour Results:  Recent Results (from the past 24 hour(s))   GLUCOSE, POC    Collection Time: 22 12:21 PM   Result Value Ref Range    Glucose (POC) 104 65 - 117 mg/dL    Performed by Kelvin Prado, POC    Collection Time: 22  4:08 PM   Result Value Ref Range    Glucose (POC) 122 (H) 65 - 117 mg/dL    Performed by Niall Cota    CBC W/O DIFF    Collection Time: 22  8:48 PM   Result Value Ref Range    WBC 13.3 (H) 3.6 - 11.0 K/uL    RBC 3.11 (L) 3.80 - 5.20 M/uL    HGB 7.8 (L) 11.5 - 16.0 g/dL    HCT 24.7 (L) 35.0 - 47.0 %    MCV 79.4 (L) 80.0 - 99.0 FL    MCH 25.1 (L) 26.0 - 34.0 PG    MCHC 31.6 30.0 - 36.5 g/dL    RDW 17.7 (H) 11.5 - 14.5 %    PLATELET 664 567 - 324 K/uL    MPV 10.8 8.9 - 12.9 FL    NRBC 0.2 (H) 0.0  WBC    ABSOLUTE NRBC 0.02 (H) 0.00 - 0.01 K/uL   GLUCOSE, POC    Collection Time: 01/06/22 10:06 PM   Result Value Ref Range    Glucose (POC) 110 65 - 117 mg/dL    Performed by [address] [place] [address], POC    Collection Time: 01/07/22  8:19 AM   Result Value Ref Range    Glucose (POC) 87 65 - 117 mg/dL    Performed by Maggie Armas, POC    Collection Time: 01/07/22 11:10 AM   Result Value Ref Range    Glucose (POC) 115 65 - 117 mg/dL    Performed by Kamila Barroso        IR INSERT TUNL CVC W/O PORT OVER 5 YR   Final Result   1. Successful placement of a double-lumen tunneled permacath. The catheter is   ready for use. 2.  Successful removal of existing temporary dialysis catheter. IR FLUORO GUIDE PLC CVAD   Final Result   1. Successful placement of a double-lumen tunneled permacath. The catheter is   ready for use. 2.  Successful removal of existing temporary dialysis catheter. XR CHEST PORT   Final Result   FINDINGS: IMPRESSION: Single frontal view of the chest. Interval placement right   dialysis catheter distal tip SVC/RA junction region. No pneumothorax. Median sternotomy status post CABG. Coronary artery stenting. Unchanged mild   cardiomegaly. No vascular congestion or pulmonary edema. The lungs are well inflated. No infiltrate, pleural effusion, lobar   consolidation. US RETROPERITONEUM COMP   Final Result   Echogenic right kidney consistent with medical renal disease. No   right-sided hydronephrosis. Right renal cysts. Reported left nephrectomy. Assessment:  Acute non-STEMI. S/p cardiac cath with totally occluded grafts, extensive cad, conservative,  If she has any chest pain then consider viability study and possible PCI of the LAD. She has remained chest pain free. Acute on chronic HFrEF, improved/resolved with hd    Chronic kidney disease stage V.  Started hemodialysis 12/31, temp cath placed switched to perm cath. . Chair arranged in [place] though tot until Tuesday so anticipate dc Saturday.  D/w nephrology    Unilateral kidney with history of nephrectomy for renal cell carcinoma. No rt hydronephrosis    History of renal artery stenosis of solitary right kidney    Anemia of chronic kidney disease likely, got 1 unit prbc in ed, hgb drop to 7.1, getting hd/venofer with hd, last 7.8 hgb    Coronary artery disease with history of CABG and PCI as above    Diabetes mellitus type 2. A1c 5.6. Pta on 44 units lantus, titrating to 16 units 1/6. No hypoglycemia seen yet. Follow and titrate    Hypertension: Hypotension with HD. 12/31:-Discontinued amlodipine Imdur and hydralazine. continue metoprololxl 50 mg Daily. Hyperlipidemia on Atorvastatin    Peripheral vascular disease with history of left SFA stenosis    Hypokalemia repleted    Plan:  HD per nephrology tts  Continue metoprolol 50 mg bid  Continue lantus 16 units qhs, ssi switched to sensitive & hypoglycemia protocol. HD chair arranged for next Tuesday 1/11/22. Anticipate dc home after hd 1/11/22. She states is working on ride home. D/w nephrology and preferred she goes after hd on Saturday- informed patient. vtep mechanical  Full code    Care Plan discussed with: Patient/Family, consultant. Disposition: anticipate dc Saturday 1/8 after hd. Total time spent with patient: 30 minutes.     Tg French MD

## 2022-01-07 NOTE — PROGRESS NOTES
CLAY spoke with Reilly Moya at [facility] about patient's dialysis chair time. He states that he still does not have confirmation of chair time because he is still waiting for medical clearance. He states that he will update CLAY through [address] when chair time is confirmed.

## 2022-01-07 NOTE — PROGRESS NOTES
Problem: Falls - Risk of  Goal: *Absence of Falls  Description: Document Edwin Bergman Fall Risk and appropriate interventions in the flowsheet.   Outcome: Progressing Towards Goal  Note: Fall Risk Interventions:  Mobility Interventions: Patient to call before getting OOB         Medication Interventions: Patient to call before getting OOB                   Problem: Patient Education: Go to Patient Education Activity  Goal: Patient/Family Education  Outcome: Progressing Towards Goal

## 2022-01-08 VITALS
RESPIRATION RATE: 20 BRPM | OXYGEN SATURATION: 100 % | HEART RATE: 93 BPM | HEIGHT: 59 IN | TEMPERATURE: 97.7 F | WEIGHT: 158.73 LBS | SYSTOLIC BLOOD PRESSURE: 132 MMHG | DIASTOLIC BLOOD PRESSURE: 73 MMHG | BODY MASS INDEX: 32 KG/M2

## 2022-01-08 PROBLEM — Z99.2 ESRD (END STAGE RENAL DISEASE) ON DIALYSIS (HCC): Status: ACTIVE | Noted: 2022-01-08

## 2022-01-08 PROBLEM — N18.6 ESRD (END STAGE RENAL DISEASE) ON DIALYSIS (HCC): Status: ACTIVE | Noted: 2022-01-08

## 2022-01-08 LAB
ALBUMIN SERPL-MCNC: 2.9 G/DL (ref 3.5–5)
ANION GAP SERPL CALC-SCNC: 9 MMOL/L (ref 5–15)
BUN SERPL-MCNC: 24 MG/DL (ref 6–20)
BUN/CREAT SERPL: 4 (ref 12–20)
CA-I BLD-MCNC: 8.5 MG/DL (ref 8.5–10.1)
CHLORIDE SERPL-SCNC: 97 MMOL/L (ref 97–108)
CO2 SERPL-SCNC: 25 MMOL/L (ref 21–32)
COVID-19 RAPID TEST, COVR: NOT DETECTED
CREAT SERPL-MCNC: 5.77 MG/DL (ref 0.55–1.02)
GLUCOSE BLD STRIP.AUTO-MCNC: 58 MG/DL (ref 65–117)
GLUCOSE SERPL-MCNC: 82 MG/DL (ref 65–100)
PERFORMED BY, TECHID: ABNORMAL
PHOSPHATE SERPL-MCNC: 3.8 MG/DL (ref 2.6–4.7)
POTASSIUM SERPL-SCNC: 3.6 MMOL/L (ref 3.5–5.1)
SODIUM SERPL-SCNC: 131 MMOL/L (ref 136–145)
SPECIMEN SOURCE: NORMAL

## 2022-01-08 PROCEDURE — 82962 GLUCOSE BLOOD TEST: CPT

## 2022-01-08 PROCEDURE — 74011250637 HC RX REV CODE- 250/637: Performed by: INTERNAL MEDICINE

## 2022-01-08 PROCEDURE — 36415 COLL VENOUS BLD VENIPUNCTURE: CPT

## 2022-01-08 PROCEDURE — 87635 SARS-COV-2 COVID-19 AMP PRB: CPT

## 2022-01-08 PROCEDURE — 74011250637 HC RX REV CODE- 250/637: Performed by: HOSPITALIST

## 2022-01-08 PROCEDURE — 80069 RENAL FUNCTION PANEL: CPT

## 2022-01-08 PROCEDURE — 90935 HEMODIALYSIS ONE EVALUATION: CPT

## 2022-01-08 PROCEDURE — 74011250636 HC RX REV CODE- 250/636

## 2022-01-08 RX ORDER — HEPARIN SODIUM 1000 [USP'U]/ML
INJECTION, SOLUTION INTRAVENOUS; SUBCUTANEOUS
Status: COMPLETED
Start: 2022-01-08 | End: 2022-01-08

## 2022-01-08 RX ORDER — CINACALCET 30 MG/1
30 TABLET, FILM COATED ORAL
Qty: 30 TABLET | Refills: 2 | Status: SHIPPED | OUTPATIENT
Start: 2022-01-08 | End: 2022-04-08

## 2022-01-08 RX ORDER — INSULIN GLARGINE 100 [IU]/ML
14 INJECTION, SOLUTION SUBCUTANEOUS
Qty: 1 PEN | Refills: 0 | Status: SHIPPED | OUTPATIENT
Start: 2022-01-08

## 2022-01-08 RX ADMIN — HEPARIN SODIUM 3600 UNITS: 1000 INJECTION, SOLUTION INTRAVENOUS; SUBCUTANEOUS at 11:01

## 2022-01-08 RX ADMIN — Medication 1000 UNITS: at 13:30

## 2022-01-08 RX ADMIN — POTASSIUM CHLORIDE 40 MEQ: 1500 TABLET, EXTENDED RELEASE ORAL at 13:30

## 2022-01-08 RX ADMIN — METOPROLOL SUCCINATE 50 MG: 50 TABLET, EXTENDED RELEASE ORAL at 13:30

## 2022-01-08 RX ADMIN — CETIRIZINE HYDROCHLORIDE 10 MG: 10 TABLET, FILM COATED ORAL at 13:30

## 2022-01-08 RX ADMIN — ASPIRIN 81 MG CHEWABLE TABLET 81 MG: 81 TABLET CHEWABLE at 13:30

## 2022-01-08 RX ADMIN — CLOPIDOGREL BISULFATE 75 MG: 75 TABLET ORAL at 13:31

## 2022-01-08 RX ADMIN — RANOLAZINE 500 MG: 500 TABLET, FILM COATED, EXTENDED RELEASE ORAL at 13:30

## 2022-01-08 NOTE — DISCHARGE SUMMARY
HOSPITALIST DISCHARGE SUMMARY    NAME: Pedro Sow   :  1950   MRN:  [id number]     Date/Time:  2022 9:36 AM    DISCHARGE DIAGNOSIS:  Principal Problem:    NSTEMI (non-ST elevated myocardial infarction) ([facility] (2021)    Active Problems:    HTN (hypertension) (2021)      HLD (hyperlipidemia) (2021)      Edema (2021)      Type 2 diabetes mellitus, with long-term current use of insulin ([facility] (2021)      Anemia (2021)      Acquired absence of kidney (2021)      ESRD (end stage renal disease) on dialysis ([facility] (2022)        Admission Diagnosis:  Nstemi, fluid overload/HFrEF, ckd 5    CONSULTATIONS: Cardiology, Nephrology and Hospitalist    Procedures: see electronic medical records for all procedures/Xrays and details which were not copied into this note but were reviewed prior to creation of Plan. DISCHARGE SUMMARY/HOSPITAL COURSE: for full details see H&P, daily progress notes, labs, consult notes. Briefly As Per HPI:   The patient is seen for follow up. Is a 69-year-old female with a history of coronary artery disease with CABG in  and PCI as well as non-STEMI, stage V chronic kidney disease with a unilateral kidney, diabetes, history of nephrectomy for renal cell carcinoma who came to the ED with complaints of shortness of breath and left-sided chest pain. Also with lower extremity edema. In the ED she was hypertensive with otherwise stable vital signs. Labs showed a hemoglobin of 7.1, potassium 2.9, creatinine 5.5 which is her baseline. Initial troponin was 876 with a repeat of 1068.     Patient is from [place] and previously received all her care at [facility] but she went to [facility] instead because she did not trust the other hospital     Patient is normally followed by a cardiologist in [place].  Her nephrologist has previously recommended that she start dialysis but patient has refused     Her troponin peaked at 1191     Renal US showed No right-sided hydronephrosis.  s/p left nephrectomy. Clinically volume overload, no uremia. Patient was initially refusing consideration of hemodialysis but after further discussion with nephrology, she is agreeable. Temporary dialysis catheter which we can eventually exchange to permanent catheter. IR consulted   to set up her dialysis chair close to her home in [place]. Perm cath placed 1/6/22.     Followed by cardiology secondary to nstemi. Cardiac Cath 1/3/22:via left radial artery.  Totally occluded grafts.  Extensively stented venous graft to the circumflex that is totally occluded. Zak Cejak is extensively stented as well that is totally occluded proximally and reconstitutes after the crux by collaterals from left circumflex artery.  First obtuse marginal is totally occluded but fills by collaterals from the diagonal artery.  LAD is totally occluded in the midsegment.  Long .  [place] is dyskinetic. No intervention with procedure. Recommended to continue medical management with aspirin, atorvastatin, Plavix and metoprolol, ranolazine. If she has any chest pain then consider viability study and possible PCI of the LAD. She will need to follow with her cardiologist  In [place]. Return to ED in recurrent chest pain. For her diabetes, oral glipizide stopped in lieu of esrd and was resumed on pta  Lantus. She was on 44 units on presentation, titrated to 14 units with good control. Will continue diabetic diet and accuchecks. Echo showed an EF of 30 to 35% with mild to moderate mitral insufficiency and mild mitral stenosis. There was a small pericardial effusion    She continued HD here while awaiting arrangement of HD in [place]. Tolerated well. She clinically is improved, no chest pain, stable bp and spo2 on room air.        CM has messaged Asher Schwartz with  [facility] this morning to determine if he will receive medical clearance from the dialysis facility today. Tentative dialysis schedule T-Th-Sat at 12noon. Tentative start date: Tuesday 01/11/2022. She will need rapid covid test and complete hd today before can be discharged in anticipation pf next session Tuesday in [place] with TTS schedule. Nephrology 1/7/22:  Assessment & Plan:      Principal Problem:    NSTEMI (non-ST elevated myocardial infarction) ([facility] (12/26/2021)     Active Problems:    HTN (hypertension) (12/26/2021)       HLD (hyperlipidemia) (12/26/2021)       Edema (12/26/2021)       Type 2 diabetes mellitus, with long-term current use of insulin ([facility] (12/26/2021)       CKD (chronic kidney disease) stage 5, GFR less than 15 ml/min ([facility] (12/26/2021)       Anemia (12/26/2021)       Acquired absence of kidney (12/26/2021)     1- JIM on possibly advanced CKD stage unknown:   -Since admission Cr 5.7->6.9 and BUN 80  -Unknown baseline.   -she probably had advanced CKD due to which her nephrologist [place] had discussed about starting dialysis with her.  -Renal US showed No right-sided hydronephrosis.  s/p left nephrectomy.  -Clinically was volume overload, no uremia  -She had her first dialysis 12/31.  -s/p HD 1/02, and 1/4/21   -will keep TTS schedule   -RT IJ permacath      Since she has a TTS outPt Chair we will dialyze her tomorrow before Dc so that she can start at the new facility this coming Tuesday   Plan discussed with Patient, Primary Attending and        2-Anemia:  -likely due to advanced CKD and iron def   -Received 1 unit of PRBC in the ED. -started on Epo iv with HD   Will also give IV venofer with HD      3-NSTEMI   -started on heparin drip.  -Echo LVEF 30-35%  S/p Cath on 1/3/21- has totally occluded grafts      5-Hypertension:  -blood pressure currently better controlled.   -Patient became hypotensive during dialysis today ,Discontinue amlodipine Imdur and hydralazine.  -I will continue metoprolol 25 mg twice a day.     6-CHF:  -Decompensated and fluid overload due to #1  -LVEF 30 to 35%  -will dc metolazone  -will dc lasix  -s/p volume removal with HD - volume status better now      7-Renal osteodystrophy:  -Ca 10.4, Phos 3.5  - iPTH 236 and Vit D 50.1  -started on sensipar 30 mg daily           Signed By: Virginia Mcfadden MD          _______________________________________________________________________   Patient seen and examined by me on day of discharge. Pertinent findings are:  Vitals:  Visit Vitals  BP (!) 136/54   Pulse 82   Temp 98.2 °F (36.8 °C)   Resp 20   Ht 4' 11\" (1.499 m)   Wt 72 kg (158 lb 11.7 oz)   SpO2 100%   BMI 32.06 kg/m²     Temp (24hrs), Av °F (36.7 °C), Min:97.5 °F (36.4 °C), Max:98.2 °F (36.8 °C)      Last 24hr Input/Output:  No intake or output data in the 24 hours ending 22 0936     General:   Awake and alert   Lungs:    CTA. Not labored. Chest wall:  No tenderness or deformity. RT ij hd cath   Heart:  Regular rate and rhythm, S1, S2 normal, no murmur, click, rub or gallop. Abdomen:   Soft, non-tender. Bowel sounds normal. No masses,  No organomegaly. Extremities: Extremities normal, atraumatic, no cyanosis + edema improved. Pulses: 2+ and symmetric all extremities. Skin: Skin color, texture, turgor normal. No rashes or lesions   Neurologic: CNII-XII intact. No gross focal deficits       _______________________________________________________________________  DISPOSITION:    Home with Family: x   Home with HH/PT/OT/RN:    SNF/LTC:    DAMIR:    OTHER:    ________________________________________________________________________  Medications Reviewed:  Current Discharge Medication List      START taking these medications    Details   cinacalcet (SENSIPAR) 30 mg tablet Take 1 Tablet by mouth nightly for 90 days.  Indications: hyperparathyroidism caused by chronic renal failure with dialysis  Qty: 30 Tablet, Refills: 2  Start date: 2022, End date: 2022      insulin glargine (Lantus Solostar U-100 Insulin) 100 unit/mL (3 mL) inpn 14 Units by SubCUTAneous route nightly. Qty: 1 Pen, Refills: 0  Start date: 1/8/2022         CONTINUE these medications which have NOT CHANGED    Details   cholecalciferol (VITAMIN D3) (1000 Units /25 mcg) tablet Take 1,000 Units by mouth daily. fish oil- omega-3 fatty acids 300-1,000 mg capsule Take 1 Capsule by mouth daily. aspirin 81 mg chewable tablet Take 81 mg by mouth daily. cetirizine (ZYRTEC) 10 mg tablet Take 10 mg by mouth. clopidogreL (PLAVIX) 75 mg tab Take 75 mg by mouth. insulin aspart U-100 (NOVOLOG) 100 unit/mL (3 mL) inpn by SubCUTAneous route Before breakfast, lunch, dinner and at bedtime. metoprolol succinate (TOPROL-XL) 50 mg XL tablet Take 50 mg by mouth daily. nitroglycerin (NITROSTAT) 0.4 mg SL tablet 0.4 mg by SubLINGual route every five (5) minutes as needed for Chest Pain. Up to 3 doses. ranolazine ER (RANEXA) 500 mg SR tablet Take 500 mg by mouth two (2) times a day. rosuvastatin (CRESTOR) 20 mg tablet Take 20 mg by mouth nightly. STOP taking these medications       amLODIPine (NORVASC) 10 mg tablet Comments:   Reason for Stopping:         glipiZIDE SR (GLUCOTROL XL) 10 mg CR tablet Comments:   Reason for Stopping:         insulin detemir U-100 (LEVEMIR) 100 unit/mL injection Comments:   Reason for Stopping:         hydrALAZINE (APRESOLINE) 25 mg tablet Comments:   Reason for Stopping:         isosorbide mononitrate ER (IMDUR) 30 mg tablet Comments:   Reason for Stopping:              PMH/SH reviewed - no change compared to H&P  ________________________________________________________________________    Recommended diet:  Cardiac Diet and Diabetic Diet  Recommended activity:Activity as tolerated       Follow Up:  1- Dr. Agudelo , set up an appointment to see them in 7-10 days.   2-and  Cardiology, Nephrology and Hospitalist ______________________________________________________________________  Total time spent in discharge (min): 45   ________________________________________________________________________    Care Plan discussed with:    Comments   Patient x    Family      RN x    Care Manager x                   Consultant:  x    ________________________________________________________________________  Attending Physician: Tg French MD  ________________________________________________________________________  **Lab Data Reviewed:  Recent Results (from the past 24 hour(s))   GLUCOSE, POC    Collection Time: 01/07/22 11:10 AM   Result Value Ref Range    Glucose (POC) 115 65 - 117 mg/dL    Performed by Basilia Box, POC    Collection Time: 01/07/22  4:27 PM   Result Value Ref Range    Glucose (POC) 90 65 - 117 mg/dL    Performed by Basilia Box, POC    Collection Time: 01/07/22  9:31 PM   Result Value Ref Range    Glucose (POC) 129 (H) 65 - 117 mg/dL    Performed by Jackelin Pineda

## 2022-01-08 NOTE — PROGRESS NOTES
Patient: Des Joshi MRN: [id number]  SSN: [id number]    YOB: 1950  Age: 66 y.o. Sex: female          Subjective:     Patient seen at bedside. Doing well  She had HD this am   Spoke to Dialysis Nurse     Objective:     Vitals:    01/08/22 1030 01/08/22 1100 01/08/22 1130 01/08/22 1241   BP: (!) 140/59 (!) 144/65 (!) 146/71 132/73   Pulse:    93   Resp:    20   Temp:    97.7 °F (36.5 °C)   TempSrc:       SpO2:    100%   Weight:       Height:            Intake and Output:  Yesterday's Intake and Output:  No intake/output data recorded. Physical Exam:   GENERAL: alert, cooperative, no distress, appears stated age  EYE: negative  Respiratory - in no distress   ABDOMEN: soft, non-tender. Bowel sounds normal. No masses,  no organomegaly  EXTREMITIES:  extremities normal, atraumatic, no cyanosis or edema, no ulcers,SKIN: Normal.   Skin -  no rash or abnormalities  NEUROLOGIC: negative      Dialysis access: temporary catheter site right and IJ .     Data Review    Meds    Current Facility-Administered Medications   Medication Dose Route Frequency    heparin (porcine) 1,000 unit/mL injection 3,600 Units  3,600 Units Hemodialysis DIALYSIS PRN    insulin glargine (LANTUS) injection 16 Units  16 Units SubCUTAneous QHS    insulin lispro (HUMALOG) injection   SubCUTAneous AC&HS    potassium chloride (K-DUR, KLOR-CON M20) SR tablet 40 mEq  40 mEq Oral DAILY    cinacalcet (SENSIPAR) tablet 30 mg  30 mg Oral QHS    epoetin ko-epbx (RETACRIT) injection 10,000 Units  10,000 Units SubCUTAneous Q MON, WED & FRI    glucose chewable tablet 16 g  4 Tablet Oral PRN    dextrose (D50W) injection syrg 12.5-25 g  25-50 mL IntraVENous PRN    glucagon (GLUCAGEN) injection 1 mg  1 mg IntraMUSCular PRN    sodium chloride (NS) flush 5-40 mL  5-40 mL IntraVENous PRN    acetaminophen (TYLENOL) tablet 650 mg  650 mg Oral Q4H PRN    0.9% sodium chloride infusion 250 mL  250 mL IntraVENous PRN    aspirin chewable tablet 81 mg  81 mg Oral DAILY    cetirizine (ZYRTEC) tablet 10 mg  10 mg Oral DAILY    clopidogreL (PLAVIX) tablet 75 mg  75 mg Oral DAILY    metoprolol succinate (TOPROL-XL) XL tablet 50 mg  50 mg Oral DAILY    nitroglycerin (NITROSTAT) tablet 0.4 mg  0.4 mg SubLINGual Q5MIN PRN    ranolazine ER (RANEXA) tablet 500 mg  500 mg Oral BID    atorvastatin (LIPITOR) tablet 40 mg  40 mg Oral QHS    cholecalciferol (VITAMIN D3) (1000 Units /25 mcg) tablet 1,000 Units  1,000 Units Oral DAILY    ondansetron (ZOFRAN) injection 4 mg  4 mg IntraVENous Q6H PRN       Lab      Recent Results (from the past 24 hour(s))   GLUCOSE, POC    Collection Time: 01/07/22  4:27 PM   Result Value Ref Range    Glucose (POC) 90 65 - 117 mg/dL    Performed by [address] [place] [address], POC    Collection Time: 01/07/22  9:31 PM   Result Value Ref Range    Glucose (POC) 129 (H) 65 - 117 mg/dL    Performed by Palmira Cool    RENAL FUNCTION PANEL    Collection Time: 01/08/22  8:00 AM   Result Value Ref Range    Sodium 131 (L) 136 - 145 mmol/L    Potassium 3.6 3.5 - 5.1 mmol/L    Chloride 97 97 - 108 mmol/L    CO2 25 21 - 32 mmol/L    Anion gap 9 5 - 15 mmol/L    Glucose 82 65 - 100 mg/dL    BUN 24 (H) 6 - 20 mg/dL    Creatinine 5.77 (H) 0.55 - 1.02 mg/dL    BUN/Creatinine ratio 4 (L) 12 - 20      GFR est AA 9 (L) >60 ml/min/1.73m2    GFR est non-AA 7 (L) >60 ml/min/1.73m2    Calcium 8.5 8.5 - 10.1 mg/dL    Phosphorus 3.8 2.6 - 4.7 mg/dL    Albumin 2.9 (L) 3.5 - 5.0 g/dL   GLUCOSE, POC    Collection Time: 01/08/22 12:44 PM   Result Value Ref Range    Glucose (POC) 58 (L) 65 - 117 mg/dL    Performed by UGOBE         Lab Results   Component Value Date/Time    Color Yellow/Straw 12/26/2021 11:24 PM    Appearance Clear 12/26/2021 11:24 PM    Specific gravity 1.011 12/26/2021 11:24 PM    pH (UA) 5.0 12/26/2021 11:24 PM    Protein 100 (A) 12/26/2021 11:24 PM    Glucose Negative 12/26/2021 11:24 PM    Ketone Negative 12/26/2021 11:24 PM    Bilirubin Negative 12/26/2021 11:24 PM    Urobilinogen 0.1 12/26/2021 11:24 PM    Nitrites Negative 12/26/2021 11:24 PM    Leukocyte Esterase Trace (A) 12/26/2021 11:24 PM    Bacteria Negative 12/26/2021 11:24 PM    WBC 0-4 12/26/2021 11:24 PM    RBC 0-5 12/26/2021 11:24 PM       Imaging         Assessment & Plan:     Principal Problem:    NSTEMI (non-ST elevated myocardial infarction) ([facility] (12/26/2021)    Active Problems:    HTN (hypertension) (12/26/2021)      HLD (hyperlipidemia) (12/26/2021)      Edema (12/26/2021)      Type 2 diabetes mellitus, with long-term current use of insulin ([facility] (12/26/2021)      Anemia (12/26/2021)      Acquired absence of kidney (12/26/2021)      ESRD (end stage renal disease) on dialysis ([facility] (1/8/2022)    1- JIM on possibly advanced CKD stage unknown: progressed to ESRD  -Since admission Cr 5.7->6.9 and BUN 80  -Unknown baseline.   -she probably had advanced CKD due to which her nephrologist [place] had discussed about starting dialysis with her.  -Renal US showed No right-sided hydronephrosis.  s/p left nephrectomy.  -Clinically was volume overload, no uremia  -She had her first dialysis 12/31.  -s/p HD 1/02, and 1/4/21   -will keep TTS schedule   -RT IJ permacath     Since she has a TTS outPt Chair we will dialyze her today before Dc so that she can start at the new facility this coming Tuesday   Plan discussed with Patient &  Primary Attending .  India Jain  2-Anemia:  -likely due to advanced CKD and iron def   -Received 1 unit of PRBC in the ED. -started on Epo iv with HD   Will also give IV venofer with HD      3-NSTEMI   -started on heparin drip.  -Echo LVEF 30-35%  S/p Cath on 1/3/21- has totally occluded grafts      5-Hypertension:  -blood pressure currently better controlled.   -Patient became hypotensive during dialysis today ,Discontinue amlodipine Imdur and hydralazine.  -I will continue metoprolol 25 mg twice a day.     6-CHF:  -Decompensated and fluid overload due to #1  -LVEF 30 to 35%  -will dc metolazone  -will dc lasix  -s/p volume removal with HD - volume status better now      7-Renal osteodystrophy:  -Ca 10.4, Phos 3.5  - iPTH 236 and Vit D 50.1  -started on sensipar 30 mg daily        Signed By: Abdiel Ojeda MD     January 8, 2022      This note was prepared using voice recognition system and is likely to have sound alike errors that may have been overlooked even during proofreading.   Please contact the author for any clarifications

## 2022-01-08 NOTE — PROGRESS NOTES
CM reached out to [facility] in [place], [place] at [phone] and spoke with [facility]. She stated the patient has been approved, all they need is a current weight and a negative covid test faxed to them at [phone] and the patient can start with them Tuesday 1/11/2021 at 7am.    CM notified attending. As soon as rapid test is resulted and is negative patient can be discharged from CM standpoint. Addendum 3:54pm  Patient's covid resulted and is negative. Result has been faxed to [facility] at [facility].  Patient is approved to start dialysis Tuesday at 7am.

## 2022-01-08 NOTE — PROGRESS NOTES
Discharge note:  Pt. Discharged home to self care. Verbalized understanding of discharge instructions, medications, and when to follow up with MD. Vital signs stable, IV and telemetry DC. Pt. Was wheeled down to her car, no complaints at time of discharge.

## 2022-01-11 NOTE — PROGRESS NOTES
Physician Progress Note      PATIENT:               Ana Rust  CSN #:                  [id number]  :                       1950  ADMIT DATE:       2021 1:45 AM  DISCH DATE:        2022 5:01 PM  RESPONDING  PROVIDER #:        Neri Pino PA-C          QUERY TEXT:    Michelle Briggs, Dr Marina Faust, Dr Anitra Low, Dr Adonay Antonio, & MARVIN Park,    If one of you providers who saw this patient could please answer this query:    Patient admitted with NSTEMI. If possible, please document in progress notes and discharge summary if you are evaluating and /or treating any of the following: The medical record reflects the following:  Risk Factors: NSTEMI, ESRD, CHF, HTN  Clinical Indicators:  PN: \"Malnutrition Status: Moderate malnutrition, Mild muscle mass loss, 50% or less of est energy requirements for 5 or more days. \" ALB: 3.0  Treatment: RD CONSULTED, Modify diet order to Regular, Carbohydrate Control Restriction: 4 carb, Monitor weight, intake, labs and skin changes. ASPEN Criteria:  https://aspenjournals. onlinelibrary. reyes. com/doi/full/10.1177/8654400433199755    Thank you,  SHAWN Duran, RN, [place], [facility] Specialist  [phone] or [email or web address]  Options provided:  -- Protein calorie malnutrition moderate  -- Protein calorie malnutrition mild  -- Protein calorie malnutrition severe  -- Other - I will add my own diagnosis  -- Disagree - Not applicable / Not valid  -- Disagree - Clinically unable to determine / Unknown  -- Refer to Clinical Documentation Reviewer    PROVIDER RESPONSE TEXT:    This patient has mild protein calorie malnutrition.     Query created by: Jorge Rick on 2022 2:15 PM      Electronically signed by:  Neri Pino PA-C 2022 10:12 AM

## 2022-03-18 PROBLEM — E11.9 TYPE 2 DIABETES MELLITUS, WITH LONG-TERM CURRENT USE OF INSULIN (HCC): Status: ACTIVE | Noted: 2021-12-26

## 2022-03-18 PROBLEM — Z79.4 TYPE 2 DIABETES MELLITUS, WITH LONG-TERM CURRENT USE OF INSULIN (HCC): Status: ACTIVE | Noted: 2021-12-26

## 2022-03-18 PROBLEM — I21.4 NSTEMI (NON-ST ELEVATED MYOCARDIAL INFARCTION) (HCC): Status: ACTIVE | Noted: 2021-12-26

## 2022-03-19 PROBLEM — D64.9 ANEMIA: Status: ACTIVE | Noted: 2021-12-26

## 2022-03-19 PROBLEM — R60.9 EDEMA: Status: ACTIVE | Noted: 2021-12-26

## 2022-03-19 PROBLEM — E78.5 HLD (HYPERLIPIDEMIA): Status: ACTIVE | Noted: 2021-12-26

## 2022-03-19 PROBLEM — Z90.5 ACQUIRED ABSENCE OF KIDNEY: Status: ACTIVE | Noted: 2021-12-26

## 2022-03-20 PROBLEM — I10 HTN (HYPERTENSION): Status: ACTIVE | Noted: 2021-12-26

## 2022-03-20 PROBLEM — Z99.2 ESRD (END STAGE RENAL DISEASE) ON DIALYSIS (HCC): Status: ACTIVE | Noted: 2022-01-08

## 2022-03-20 PROBLEM — N18.6 ESRD (END STAGE RENAL DISEASE) ON DIALYSIS (HCC): Status: ACTIVE | Noted: 2022-01-08

## 2023-05-21 RX ORDER — RANOLAZINE 500 MG/1
500 TABLET, EXTENDED RELEASE ORAL 2 TIMES DAILY
COMMUNITY

## 2023-05-21 RX ORDER — METOPROLOL SUCCINATE 50 MG/1
50 TABLET, EXTENDED RELEASE ORAL DAILY
COMMUNITY

## 2023-05-21 RX ORDER — CLOPIDOGREL BISULFATE 75 MG/1
75 TABLET ORAL
COMMUNITY

## 2023-05-21 RX ORDER — NITROGLYCERIN 0.4 MG/1
0.4 TABLET SUBLINGUAL
COMMUNITY

## 2023-05-21 RX ORDER — ASPIRIN 81 MG/1
81 TABLET, CHEWABLE ORAL DAILY
COMMUNITY

## 2023-05-21 RX ORDER — CETIRIZINE HYDROCHLORIDE 10 MG/1
10 TABLET ORAL
COMMUNITY

## 2023-05-21 RX ORDER — ROSUVASTATIN CALCIUM 20 MG/1
20 TABLET, COATED ORAL NIGHTLY
COMMUNITY

## 2023-05-21 RX ORDER — INSULIN GLARGINE 100 [IU]/ML
14 INJECTION, SOLUTION SUBCUTANEOUS
COMMUNITY
Start: 2022-01-08

## (undated) DEVICE — SYRINGE MED 10ML PUR GAM COMPATIBLE POLYCARB FIX M LUER CONN

## (undated) DEVICE — TUBING PRESS INJ FLX SH 30IN --

## (undated) DEVICE — GUIDEWIRE VASC L260CM DIA0.035IN TIP L3MM STD EXCHG PTFE J

## (undated) DEVICE — SC 3W MP RA OFF PB - PG: Brand: NAMIC

## (undated) DEVICE — PERCUTANEOUS ENTRY THINWALL NEEDLE  ONE-PART: Brand: COOK

## (undated) DEVICE — SYRINGE MED 10ML RED POLYCARB BRL FIX M LUER CONN FLAT GRP

## (undated) DEVICE — 3M™ STERI-DRAPE™ SMALL DRAPE WITH ADHESIVE APERTURE 1092 25/BX,4/CS&#X20;: Brand: STERI-DRAPE™

## (undated) DEVICE — 3M™ TEGADERM™ TRANSPARENT FILM DRESSING FRAME STYLE, 1626W, 4 IN X 4-3/4 IN (10 CM X 12 CM), 50/CT 4CT/CASE: Brand: 3M™ TEGADERM™

## (undated) DEVICE — TR BAND RADIAL ARTERY COMPRESSION DEVICE: Brand: TR BAND

## (undated) DEVICE — COPE MANDRIL WIRE GUIDE STAINLESS STEEL: Brand: COPE

## (undated) DEVICE — SURGICAL PROCEDURE TRAY CRD CATH 3 PRT

## (undated) DEVICE — CATHETER ANGIO JR4 PIG STD AD 4 FRX110 CM MP QUIK CARE INFIN

## (undated) DEVICE — WASTEBAG DRIP/ADAPTER: Brand: MEDLINE INDUSTRIES, INC.

## (undated) DEVICE — GLIDESHEATH SLENDER ACCESS KIT: Brand: GLIDESHEATH SLENDER